# Patient Record
Sex: MALE | Race: WHITE | NOT HISPANIC OR LATINO | Employment: FULL TIME | URBAN - METROPOLITAN AREA
[De-identification: names, ages, dates, MRNs, and addresses within clinical notes are randomized per-mention and may not be internally consistent; named-entity substitution may affect disease eponyms.]

---

## 2020-01-28 ENCOUNTER — OFFICE VISIT (OUTPATIENT)
Dept: FAMILY MEDICINE CLINIC | Facility: CLINIC | Age: 35
End: 2020-01-28

## 2020-01-28 VITALS
SYSTOLIC BLOOD PRESSURE: 150 MMHG | DIASTOLIC BLOOD PRESSURE: 92 MMHG | HEIGHT: 71 IN | OXYGEN SATURATION: 97 % | HEART RATE: 101 BPM | BODY MASS INDEX: 35.28 KG/M2 | WEIGHT: 252 LBS

## 2020-01-28 DIAGNOSIS — R41.840 POOR CONCENTRATION: ICD-10-CM

## 2020-01-28 DIAGNOSIS — I10 ESSENTIAL HYPERTENSION: Primary | ICD-10-CM

## 2020-01-28 DIAGNOSIS — R06.83 SNORING: ICD-10-CM

## 2020-01-28 DIAGNOSIS — G89.29 CHRONIC RIGHT SHOULDER PAIN: ICD-10-CM

## 2020-01-28 DIAGNOSIS — N50.89 SWELLING OF LEFT TESTICLE: ICD-10-CM

## 2020-01-28 DIAGNOSIS — F33.1 DEPRESSION, MAJOR, RECURRENT, MODERATE (HCC): ICD-10-CM

## 2020-01-28 DIAGNOSIS — F41.1 GAD (GENERALIZED ANXIETY DISORDER): ICD-10-CM

## 2020-01-28 DIAGNOSIS — M25.511 CHRONIC RIGHT SHOULDER PAIN: ICD-10-CM

## 2020-01-28 PROCEDURE — 99204 OFFICE O/P NEW MOD 45 MIN: CPT | Performed by: FAMILY MEDICINE

## 2020-01-28 RX ORDER — LISINOPRIL 10 MG/1
10 TABLET ORAL DAILY
Qty: 30 TABLET | Refills: 1 | Status: SHIPPED | OUTPATIENT
Start: 2020-01-28 | End: 2020-02-22 | Stop reason: SDUPTHER

## 2020-01-28 NOTE — PROGRESS NOTES
Frank Champion presents today to establish care.    Chief Complaint   Patient presents with   • Establish Care   • Blood Pressure Check     was on lisinopril in the past, thinks blood pressure may be elevated again   • Snoring     girlfriend reports snoring loudly   • Depression     reports taking wellbutrin for a few years for anxiety depression   • ADD     symptoms of ADD that would like to discuss, trouble staying focused        Snoring   This is a new problem. Associated symptoms include arthralgias and fatigue. Pertinent negatives include no abdominal pain, chest pain or rash.   Depression   Visit Type: initial  Patient presents with the following symptoms: anhedonia, decreased concentration, depressed mood, excessive worry, irritability, nervousness/anxiety and weight gain.  Patient is not experiencing: palpitations and suicidal ideas.  Treatment tried: non-SSRI antidepressants      ADD   This is a new problem. Associated symptoms include arthralgias and fatigue. Pertinent negatives include no abdominal pain, chest pain or rash.   Anxiety   Presents for initial visit. Symptoms include decreased concentration, depressed mood, excessive worry, irritability and nervous/anxious behavior. Patient reports no chest pain, palpitations or suicidal ideas.     His past medical history is significant for depression.   Hypertension   This is a new problem. Associated symptoms include anxiety. Pertinent negatives include no chest pain or palpitations. Past treatments include ACE inhibitors.      PHQ-2/PHQ-9 Depression Screening 1/28/2020   Little interest or pleasure in doing things 1   Feeling down, depressed, or hopeless 2   Trouble falling or staying asleep, or sleeping too much 0   Feeling tired or having little energy 2   Poor appetite or overeating 3   Feeling bad about yourself - or that you are a failure or have let yourself or your family down 2   Trouble concentrating on things, such as reading the newspaper or  watching television 3   Moving or speaking so slowly that other people could have noticed. Or the opposite - being so fidgety or restless that you have been moving around a lot more than usual 0   Thoughts that you would be better off dead, or of hurting yourself in some way 0   Total Score 13   If you checked off any problems, how difficult have these problems made it for you to do your work, take care of things at home, or get along with other people? Somewhat difficult     JERI-7  Over the last two weeks, how often have you been bothered by the following problems?  Feeling nervous, anxious or on edge: 3  Not being able to stop or control worryin  Worrying too much about different things: 2  Trouble Relaxin  Being so restless that it is hard to sit still: 1  Becoming easily annoyed or irritable: 3  Feeling afraid as if something awful might happen: 1  JERI 7 Total Score: 14  If you checked any problems, how difficult have these problems made it for you to do your work, take care of things at home, or get along with other people: Somewhat difficult      Partner states he always snores and she has to wake him up consistently and it sounds like he's not breathing. He's ok if he rolls over throughout the night. Worse when he first falls asleep. Going on for years. He could fall asleep anytime within 5 minutes. He's very groggy in the morning for about 15 minutes.     Used to take lisinopril 4-5 years ago. He ran out of medication. Blood pressure was back to normal when he stopped taking it. No side effects with lisinopril. Elevated blood pressure for past year and half at doctor's offices.     Bad habit of occasionally gone for awhile without taking wellbutrin and mood gets a little worse. He feels better while he's taking it. It worked when he takes it regularly. Past 2 weeks didn't have medication for 5 days and mood was more irritable and depressed. Anxiety getting a little bit worse. Concerned he has ADHD  for years and years. Really hard for him during activities that he's not interested in to stay focused. He feels hyper focused when he like something. He get irritable if he breaks focus. Memory and lack of remembering to take medication. All the time scatterbrained. Used to be a . 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     Right shoulder pain for months. No known injury. Used to be a catcher playing baseball.     Never regular smoker, quit late last year.     He thinks he's had Tdap around 3-6 years ago.     He noticed a nodule on one of his testicles. Onset 3 weeks ago. Denies pain.       Review of Systems   Constitutional: Positive for fatigue, irritability and unexpected weight gain.   HENT: Negative for hearing loss.    Eyes: Positive for visual disturbance ( wears glasses).   Respiratory: Positive for apnea and snoring.         Snoring   Cardiovascular: Negative for chest pain and palpitations.   Gastrointestinal: Negative for abdominal pain.   Endocrine: Negative for cold intolerance and heat intolerance.   Genitourinary: Positive for scrotal swelling. Negative for frequency and testicular pain.   Musculoskeletal: Positive for arthralgias.   Skin: Negative for rash.   Neurological: Negative for headache.   Hematological: Does not bruise/bleed easily.   Psychiatric/Behavioral: Positive for decreased concentration and depressed mood. Negative for suicidal ideas. The patient is nervous/anxious.         Past Medical History:   Diagnosis Date   • Anxiety    • Arthritis    • Depression    • HTN (hypertension)    • Wears glasses         Past Surgical History:   Procedure Laterality Date   • VASECTOMY          Family History   Problem Relation Age of Onset   • Mental illness Mother         bipolar vs depression   • Arthritis Father    • Hyperlipidemia Paternal Grandfather    • Hypertension Paternal Grandfather    • Heart attack Paternal Grandfather    • Stroke  "Paternal Grandfather         Social History     Socioeconomic History   • Marital status: Single     Spouse name: Julienne Patel   • Number of children: Not on file   • Years of education: Not on file   • Highest education level: Not on file   Tobacco Use   • Smoking status: Former Smoker     Types: Cigars, Cigarettes     Last attempt to quit: 2019     Years since quittin.0   • Smokeless tobacco: Never Used   • Tobacco comment: never been a regular smoker, very little tobacco use. reports maybe 2 cigarettes per month.   Substance and Sexual Activity   • Alcohol use: Yes     Alcohol/week: 3.0 - 4.0 standard drinks     Types: 3 - 4 Glasses of wine per week   • Drug use: Never        Current Outpatient Medications on File Prior to Visit   Medication Sig Dispense Refill   • buPROPion XL (WELLBUTRIN XL) 300 MG 24 hr tablet Take 1 tablet by mouth Daily.       No current facility-administered medications on file prior to visit.        Allergies   Allergen Reactions   • Sulfa Antibiotics GI Intolerance        Visit Vitals  /92 (BP Location: Left arm, Patient Position: Sitting, Cuff Size: Adult)   Pulse 101   Ht 180.3 cm (71\")   Wt 114 kg (252 lb)   SpO2 97%   BMI 35.15 kg/m²        Physical Exam   Constitutional: He is oriented to person, place, and time. No distress. He is obese.  HENT:   Nose: Nose normal.   Mouth/Throat: Oropharynx is clear and moist.   Eyes: Conjunctivae are normal.   Wears glasses   Neck: Neck supple. No thyromegaly present.   Cardiovascular: Normal rate and regular rhythm.   No murmur heard.  Pulses:       Posterior tibial pulses are 2+ on the right side, and 2+ on the left side.   Pulmonary/Chest: Effort normal and breath sounds normal.   Abdominal: Soft. There is no hepatosplenomegaly. There is no tenderness. Hernia confirmed negative in the right inguinal area and confirmed negative in the left inguinal area.   Genitourinary: Testes normal and penis normal. Right testis shows no mass, no " swelling and no tenderness. Left testis shows no mass, no swelling and no tenderness. Circumcised.   Musculoskeletal: He exhibits no edema.        Right shoulder: He exhibits decreased range of motion.   Lymphadenopathy:     He has no cervical adenopathy.   Neurological: He is alert and oriented to person, place, and time.   Skin: Skin is warm and dry. No rash noted.   Psychiatric: He has a normal mood and affect.   Vitals reviewed.       No results found for this or any previous visit.     Frank was seen today for establish care, blood pressure check, snoring, depression and add.    Diagnoses and all orders for this visit:    Essential hypertension  -     lisinopril (PRINIVIL,ZESTRIL) 10 MG tablet; Take 1 tablet by mouth Daily.  Uncontrolled.  Patient previously taken lisinopril without side effects.  Plan to start 10 mg.  Reassess next month.  Snoring  -     Ambulatory Referral to Sleep Medicine  New.  Recommend further evaluation with the sleep clinic for probable sleep apnea.  Depression, major, recurrent, moderate (CMS/HCC)  -     Ambulatory Referral to Psychiatry  Uncontrolled.  Medication noncompliance.  He did not need refill of Wellbutrin today.  With his combination of depression and anxiety as well as concern for ADHD recommend further evaluation with psychiatry and medication management.  Poor concentration  -     Ambulatory Referral to Psychiatry  With his combination of depression and anxiety as well as concern for ADHD recommend further evaluation with psychiatry and medication management.  JERI (generalized anxiety disorder)  -     Ambulatory Referral to Psychiatry  With his combination of depression and anxiety as well as concern for ADHD recommend further evaluation with psychiatry and medication management.  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  Recommend starting with physical therapy for  shoulder pain and improved range of motion.  If not improving consider further imaging such as MRI or referral to orthopedics.  Swelling of left testicle  -     US Scrotum & Testicles; Future  Normal exam today.  However with history of nodule and his young age recommend further evaluation with ultrasound.    Prior immunizations records requested from health department.  Return in about 4 weeks (around 2/25/2020) for Office visit.

## 2020-02-22 ENCOUNTER — OFFICE VISIT (OUTPATIENT)
Dept: FAMILY MEDICINE CLINIC | Facility: CLINIC | Age: 35
End: 2020-02-22

## 2020-02-22 ENCOUNTER — LAB (OUTPATIENT)
Dept: LAB | Facility: HOSPITAL | Age: 35
End: 2020-02-22

## 2020-02-22 VITALS
WEIGHT: 255.8 LBS | OXYGEN SATURATION: 96 % | HEART RATE: 81 BPM | SYSTOLIC BLOOD PRESSURE: 128 MMHG | BODY MASS INDEX: 35.81 KG/M2 | DIASTOLIC BLOOD PRESSURE: 72 MMHG | HEIGHT: 71 IN

## 2020-02-22 DIAGNOSIS — F90.2 ATTENTION DEFICIT HYPERACTIVITY DISORDER (ADHD), COMBINED TYPE: ICD-10-CM

## 2020-02-22 DIAGNOSIS — F33.1 DEPRESSION, MAJOR, RECURRENT, MODERATE (HCC): ICD-10-CM

## 2020-02-22 DIAGNOSIS — Z13.0 SCREENING FOR DEFICIENCY ANEMIA: ICD-10-CM

## 2020-02-22 DIAGNOSIS — E66.01 MORBIDLY OBESE (HCC): ICD-10-CM

## 2020-02-22 DIAGNOSIS — Z00.00 WELL ADULT EXAM: Primary | ICD-10-CM

## 2020-02-22 DIAGNOSIS — I10 ESSENTIAL HYPERTENSION: ICD-10-CM

## 2020-02-22 DIAGNOSIS — F41.1 GAD (GENERALIZED ANXIETY DISORDER): ICD-10-CM

## 2020-02-22 DIAGNOSIS — I10 ESSENTIAL HYPERTENSION: Primary | ICD-10-CM

## 2020-02-22 LAB
ALBUMIN SERPL-MCNC: 4.5 G/DL (ref 3.5–5.2)
ALBUMIN/GLOB SERPL: 1.5 G/DL
ALP SERPL-CCNC: 56 U/L (ref 39–117)
ALT SERPL W P-5'-P-CCNC: 31 U/L (ref 1–41)
ANION GAP SERPL CALCULATED.3IONS-SCNC: 13.1 MMOL/L (ref 5–15)
AST SERPL-CCNC: 20 U/L (ref 1–40)
BASOPHILS # BLD AUTO: 0.07 10*3/MM3 (ref 0–0.2)
BASOPHILS NFR BLD AUTO: 0.8 % (ref 0–1.5)
BILIRUB SERPL-MCNC: 0.3 MG/DL (ref 0.2–1.2)
BUN BLD-MCNC: 12 MG/DL (ref 6–20)
BUN/CREAT SERPL: 13 (ref 7–25)
CALCIUM SPEC-SCNC: 9.6 MG/DL (ref 8.6–10.5)
CHLORIDE SERPL-SCNC: 102 MMOL/L (ref 98–107)
CHOLEST SERPL-MCNC: 185 MG/DL (ref 0–200)
CO2 SERPL-SCNC: 26.9 MMOL/L (ref 22–29)
CREAT BLD-MCNC: 0.92 MG/DL (ref 0.76–1.27)
DEPRECATED RDW RBC AUTO: 42.9 FL (ref 37–54)
EOSINOPHIL # BLD AUTO: 0.17 10*3/MM3 (ref 0–0.4)
EOSINOPHIL NFR BLD AUTO: 2 % (ref 0.3–6.2)
ERYTHROCYTE [DISTWIDTH] IN BLOOD BY AUTOMATED COUNT: 12.9 % (ref 12.3–15.4)
GFR SERPL CREATININE-BSD FRML MDRD: 95 ML/MIN/1.73
GLOBULIN UR ELPH-MCNC: 3.1 GM/DL
GLUCOSE BLD-MCNC: 79 MG/DL (ref 65–99)
HCT VFR BLD AUTO: 40.7 % (ref 37.5–51)
HDLC SERPL-MCNC: 35 MG/DL (ref 40–60)
HGB BLD-MCNC: 13.4 G/DL (ref 13–17.7)
IMM GRANULOCYTES # BLD AUTO: 0.02 10*3/MM3 (ref 0–0.05)
IMM GRANULOCYTES NFR BLD AUTO: 0.2 % (ref 0–0.5)
LDLC SERPL CALC-MCNC: 113 MG/DL (ref 0–100)
LDLC/HDLC SERPL: 3.22 {RATIO}
LYMPHOCYTES # BLD AUTO: 2.64 10*3/MM3 (ref 0.7–3.1)
LYMPHOCYTES NFR BLD AUTO: 30.4 % (ref 19.6–45.3)
MCH RBC QN AUTO: 29.6 PG (ref 26.6–33)
MCHC RBC AUTO-ENTMCNC: 32.9 G/DL (ref 31.5–35.7)
MCV RBC AUTO: 89.8 FL (ref 79–97)
MONOCYTES # BLD AUTO: 0.67 10*3/MM3 (ref 0.1–0.9)
MONOCYTES NFR BLD AUTO: 7.7 % (ref 5–12)
NEUTROPHILS # BLD AUTO: 5.11 10*3/MM3 (ref 1.7–7)
NEUTROPHILS NFR BLD AUTO: 58.9 % (ref 42.7–76)
NRBC BLD AUTO-RTO: 0 /100 WBC (ref 0–0.2)
PLATELET # BLD AUTO: 377 10*3/MM3 (ref 140–450)
PMV BLD AUTO: 11.8 FL (ref 6–12)
POTASSIUM BLD-SCNC: 4.3 MMOL/L (ref 3.5–5.2)
PROT SERPL-MCNC: 7.6 G/DL (ref 6–8.5)
RBC # BLD AUTO: 4.53 10*6/MM3 (ref 4.14–5.8)
SODIUM BLD-SCNC: 142 MMOL/L (ref 136–145)
TRIGL SERPL-MCNC: 187 MG/DL (ref 0–150)
TSH SERPL DL<=0.05 MIU/L-ACNC: 1.63 UIU/ML (ref 0.27–4.2)
VLDLC SERPL-MCNC: 37.4 MG/DL (ref 5–40)
WBC NRBC COR # BLD: 8.68 10*3/MM3 (ref 3.4–10.8)

## 2020-02-22 PROCEDURE — 80053 COMPREHEN METABOLIC PANEL: CPT

## 2020-02-22 PROCEDURE — 84443 ASSAY THYROID STIM HORMONE: CPT

## 2020-02-22 PROCEDURE — 80061 LIPID PANEL: CPT

## 2020-02-22 PROCEDURE — 85025 COMPLETE CBC W/AUTO DIFF WBC: CPT

## 2020-02-22 PROCEDURE — 99395 PREV VISIT EST AGE 18-39: CPT | Performed by: FAMILY MEDICINE

## 2020-02-22 RX ORDER — LISINOPRIL 10 MG/1
10 TABLET ORAL DAILY
Qty: 30 TABLET | Refills: 5 | Status: SHIPPED | OUTPATIENT
Start: 2020-02-22 | End: 2020-06-30 | Stop reason: SDUPTHER

## 2020-02-23 PROBLEM — E78.1 HYPERTRIGLYCERIDEMIA: Status: ACTIVE | Noted: 2020-02-22

## 2020-02-26 ENCOUNTER — CONSULT (OUTPATIENT)
Dept: SLEEP MEDICINE | Facility: HOSPITAL | Age: 35
End: 2020-02-26

## 2020-02-26 VITALS
HEART RATE: 70 BPM | DIASTOLIC BLOOD PRESSURE: 84 MMHG | BODY MASS INDEX: 34.27 KG/M2 | OXYGEN SATURATION: 98 % | HEIGHT: 72 IN | WEIGHT: 253 LBS | SYSTOLIC BLOOD PRESSURE: 137 MMHG

## 2020-02-26 DIAGNOSIS — E66.09 CLASS 1 OBESITY DUE TO EXCESS CALORIES WITHOUT SERIOUS COMORBIDITY WITH BODY MASS INDEX (BMI) OF 34.0 TO 34.9 IN ADULT: ICD-10-CM

## 2020-02-26 DIAGNOSIS — G47.33 OBSTRUCTIVE SLEEP APNEA, ADULT: ICD-10-CM

## 2020-02-26 DIAGNOSIS — R06.83 SNORING: Primary | ICD-10-CM

## 2020-02-26 DIAGNOSIS — G47.61 PLMD (PERIODIC LIMB MOVEMENT DISORDER): ICD-10-CM

## 2020-02-26 PROBLEM — E66.811 CLASS 1 OBESITY DUE TO EXCESS CALORIES WITHOUT SERIOUS COMORBIDITY WITH BODY MASS INDEX (BMI) OF 34.0 TO 34.9 IN ADULT: Status: ACTIVE | Noted: 2020-02-22

## 2020-02-26 PROCEDURE — 99203 OFFICE O/P NEW LOW 30 MIN: CPT | Performed by: INTERNAL MEDICINE

## 2020-02-26 RX ORDER — RANITIDINE HCL 75 MG
75 TABLET ORAL 2 TIMES DAILY
COMMUNITY
End: 2020-06-30

## 2020-02-28 NOTE — PROGRESS NOTES
Subjective   Frank Champion is a 34 y.o. male is being seen for consultation today at the request of Magaly Currie MD for the evaluation of snoring and nonrestorative sleep.    History of Present Illness  Patient states that he falls asleep quickly at night but often awakens frequently and weight is up about 3:57 AM and sometimes has problems getting back to sleep.  He has had snoring noted for at least 10 years and apneas noted also for about 10 years.  He has awaken gasping for breath.  He is not rested on arising in the morning.  He has a morning headache 1 to 2 days a week.  He is sleepy during the day.  He sometimes gets sleepy driving in the evening and has run long rumble strips.    He has a history of loud snoring snores in all positions he has awaken with a dry mouth and gasping.  He is awaken with a sore throat.  He has trouble breathing through his nose both day and night but denies ever breaking his nose.  He has a history of reflux for which she takes over-the-counter medications.  He denies hypnagogue hallucinations or sleep paralysis.  He has occasional kicking of his legs at night.  He has some chronic shoulder pain that sometimes bothers him.  He is gained about 15 pounds in the past year.      He goes to bed between 11 and 12.  He will fall asleep very quickly.  He awakens once or twice during the night.  He thinks he gets 5 to 6 hours of sleep arising at 7 AM.  He says he still feels groggy.  He has had hypertension noted for about a month.  He denies any history of diabetes coronary artery disease.  He does have a history of depression and anxiety.  Allergies   Allergen Reactions   • Sulfa Antibiotics GI Intolerance   He has a history of allergies to pollen      Current Outpatient Medications:   •  buPROPion XL (WELLBUTRIN XL) 300 MG 24 hr tablet, Take 1 tablet by mouth Daily., Disp: , Rfl:   •  lisinopril (PRINIVIL,ZESTRIL) 10 MG tablet, Take 1 tablet by mouth Daily., Disp: 30  tablet, Rfl: 5  •  raNITIdine (ZANTAC) 75 MG tablet, Take 75 mg by mouth 2 (Two) Times a Day., Disp: , Rfl:     Social History    Tobacco Use      Smoking status: Former Smoker he estimates smoking about a quarter of a pack per day for 10 years until quitting last year.        Types: Cigars, Cigarettes        Quit date:         Years since quittin.1      Smokeless tobacco: Never Used      Tobacco comment: never been a regular smoker, very little tobacco use. reports maybe 2 cigarettes per month.       Social History     Substance and Sexual Activity   Alcohol Use Yes he estimates having 2 drinks twice a week   • Alcohol/week: 3.0 - 4.0 standard drinks   • Types: 3 - 4 Glasses of wine per week       Caffeine: He has 2 cups of coffee and 2-3 zac each day    Past Medical History:   Diagnosis Date   • Anxiety    • Arthritis    • Depression    • HTN (hypertension)    • Hypertriglyceridemia 2020   • Wears glasses        Past Surgical History:   Procedure Laterality Date   • VASECTOMY         Family History   Problem Relation Age of Onset   • Mental illness Mother         bipolar vs depression   • Arthritis Father    • Hyperlipidemia Paternal Grandfather    • Hypertension Paternal Grandfather    • Heart attack Paternal Grandfather    • Stroke Paternal Grandfather    • Colon polyps Neg Hx    • Colon cancer Neg Hx        The following portions of the patient's history were reviewed and updated as appropriate: allergies, current medications, past family history, past medical history, past social history, past surgical history and problem list.    Review of Systems   Constitutional: Positive for unexpected weight change.   HENT: Negative.    Eyes: Negative.    Respiratory: Negative.    Cardiovascular: Negative.    Gastrointestinal:        He complains of frequent heartburn   Endocrine: Negative.    Genitourinary: Negative.    Musculoskeletal: Negative.    Skin: Negative.    Allergic/Immunologic: Positive for  "environmental allergies.   Neurological: Negative.    Hematological: Negative.    Psychiatric/Behavioral: Positive for dysphoric mood. The patient is nervous/anxious.    [place] score is 15/24    Objective     /84   Pulse 70   Ht 181.6 cm (71.5\")   Wt 115 kg (253 lb)   SpO2 98%   BMI 34.79 kg/m²      Physical Exam   Constitutional: He is oriented to person, place, and time. He appears well-developed and well-nourished.   He is obese.   HENT:   Head: Normocephalic and atraumatic.   He has Mallampati class IV anatomy.   Eyes: Pupils are equal, round, and reactive to light. EOM are normal.   Neck: Normal range of motion. Neck supple.   Cardiovascular: Normal rate, regular rhythm and normal heart sounds.   Pulmonary/Chest: Effort normal and breath sounds normal.   Abdominal: Soft. Bowel sounds are normal.   Musculoskeletal: Normal range of motion. He exhibits no edema.   Neurological: He is alert and oriented to person, place, and time.   Skin: Skin is warm and dry.   Psychiatric: He has a normal mood and affect. His behavior is normal.         Assessment/Plan   Frank was seen today for sleeping problem.    Diagnoses and all orders for this visit:    Snoring  -     Home Sleep Study; Future    Obstructive sleep apnea, adult  -     Home Sleep Study; Future    Class 1 obesity due to excess calories without serious comorbidity with body mass index (BMI) of 34.0 to 34.9 in adult    PLMD (periodic limb movement disorder)    Patient is a history of snoring and nonrestorative sleep.  I think is an excellent story for obstructive sleep apnea.  He also occasionally has some kicking of his legs at night.  Think is a fairly good story for obstructive sleep apnea and may have some periodic limb movement disorder also.  We will plan to proceed to home sleep testing.  I discussed possible therapies for sleep apnea including CPAP, weight control, oral appliance, and surgery.  If he continues to have problems with his legs " after treatment of his sleep apnea we may need further evaluation.  We have discussed the long-term consequences of untreated obstructive sleep apnea.  He is to return in after study.  He is encouraged to lose weight.  He declines referral for dietitian.  He is encouraged to avoid alcohol and sedatives close to bedtime.  He is encouraged to practice lateral position sleep.         Glen Jarquin MD [facility]  Sleep Medicine  Pulmonary and Critical Care Medicine

## 2020-03-10 ENCOUNTER — HOSPITAL ENCOUNTER (OUTPATIENT)
Dept: SLEEP MEDICINE | Facility: HOSPITAL | Age: 35
Discharge: HOME | End: 2020-03-10
Admitting: INTERNAL MEDICINE

## 2020-03-10 VITALS
RESPIRATION RATE: 16 BRPM | BODY MASS INDEX: 35.48 KG/M2 | WEIGHT: 253.4 LBS | HEART RATE: 88 BPM | HEIGHT: 71 IN | DIASTOLIC BLOOD PRESSURE: 84 MMHG | SYSTOLIC BLOOD PRESSURE: 130 MMHG | OXYGEN SATURATION: 97 %

## 2020-03-10 DIAGNOSIS — R06.83 SNORING: ICD-10-CM

## 2020-03-10 DIAGNOSIS — G47.33 OBSTRUCTIVE SLEEP APNEA, ADULT: ICD-10-CM

## 2020-03-10 PROCEDURE — 95806 SLEEP STUDY UNATT&RESP EFFT: CPT

## 2020-03-10 PROCEDURE — 95806 SLEEP STUDY UNATT&RESP EFFT: CPT | Performed by: INTERNAL MEDICINE

## 2020-03-11 DIAGNOSIS — R06.83 SNORING: ICD-10-CM

## 2020-03-11 DIAGNOSIS — G47.33 OBSTRUCTIVE SLEEP APNEA, ADULT: Primary | ICD-10-CM

## 2020-03-20 NOTE — PROGRESS NOTES
ADVISED PATIENT OF STUDY RESULTS. PATIENT VERBALIZED UNDERSTANDING AND WAS AGREEABLE TO PAP THERAPY

## 2020-06-30 ENCOUNTER — OFFICE VISIT (OUTPATIENT)
Dept: FAMILY MEDICINE CLINIC | Facility: CLINIC | Age: 35
End: 2020-06-30

## 2020-06-30 VITALS
DIASTOLIC BLOOD PRESSURE: 92 MMHG | SYSTOLIC BLOOD PRESSURE: 138 MMHG | WEIGHT: 258.8 LBS | HEART RATE: 95 BPM | BODY MASS INDEX: 35.05 KG/M2 | HEIGHT: 72 IN | OXYGEN SATURATION: 97 %

## 2020-06-30 DIAGNOSIS — F41.1 GAD (GENERALIZED ANXIETY DISORDER): ICD-10-CM

## 2020-06-30 DIAGNOSIS — I10 ESSENTIAL HYPERTENSION: Primary | ICD-10-CM

## 2020-06-30 DIAGNOSIS — R68.82 DECREASED LIBIDO: ICD-10-CM

## 2020-06-30 DIAGNOSIS — F32.5 MAJOR DEPRESSION IN COMPLETE REMISSION (HCC): ICD-10-CM

## 2020-06-30 PROCEDURE — 99214 OFFICE O/P EST MOD 30 MIN: CPT | Performed by: FAMILY MEDICINE

## 2020-06-30 RX ORDER — LISINOPRIL 20 MG/1
20 TABLET ORAL DAILY
Qty: 30 TABLET | Refills: 5 | Status: SHIPPED | OUTPATIENT
Start: 2020-06-30 | End: 2020-12-10 | Stop reason: SDUPTHER

## 2020-06-30 RX ORDER — LANSOPRAZOLE 15 MG/1
15 CAPSULE, DELAYED RELEASE ORAL DAILY
COMMUNITY
End: 2021-05-24

## 2020-06-30 NOTE — PROGRESS NOTES
Chief Complaint   Patient presents with   • Depression     wellbutrin is affecting sex drive   • Hypertension     blood pressure is still elevated when checking at home        Depression   Visit Type: follow-up  Patient is not experiencing: depressed mood, nervousness/anxiety and palpitations.  Treatment side effects: decreased libido.  Hypertension   This is a chronic problem. The problem has been gradually worsening since onset. The problem is uncontrolled. Pertinent negatives include no chest pain or palpitations. Past treatments include ACE inhibitors. Current antihypertension treatment includes ACE inhibitors. The current treatment provides mild improvement.      He can tell when his blood pressure is up. Lisinopril worked for awhile at 5mg. Home blood pressure 145-150/90-95.     In the past has had low sex drive. Worse with wellbutrin antidepressant. Wonders if his testosterone level . Anxiety and depression has been doing well.     Review of Systems   Cardiovascular: Negative for chest pain and palpitations.   Genitourinary: Positive for decreased libido.   Neurological: Negative for dizziness, light-headedness and headache.   Psychiatric/Behavioral: Negative for depressed mood. The patient is not nervous/anxious.         Current Outpatient Medications   Medication Sig Dispense Refill   • buPROPion XL (WELLBUTRIN XL) 300 MG 24 hr tablet Take 1 tablet by mouth Daily.     • lisinopril (PRINIVIL,ZESTRIL) 10 MG tablet Take 1 tablet by mouth Daily. 30 tablet 5     No current facility-administered medications for this visit.        Allergies   Allergen Reactions   • Sulfa Antibiotics GI Intolerance       Past Medical History:   Diagnosis Date   • Anxiety    • Arthritis    • Depression    • HTN (hypertension)    • Hypertriglyceridemia 02/22/2020   • Wears glasses         Past Surgical History:   Procedure Laterality Date   • VASECTOMY          Family History   Problem Relation Age of Onset   • Mental illness Mother   "       bipolar vs depression   • Arthritis Father    • Hyperlipidemia Paternal Grandfather    • Hypertension Paternal Grandfather    • Heart attack Paternal Grandfather    • Stroke Paternal Grandfather    • Colon polyps Neg Hx    • Colon cancer Neg Hx         Social History     Socioeconomic History   • Marital status: Single     Spouse name: Julienne Patel   • Number of children: Not on file   • Years of education: Not on file   • Highest education level: Not on file   Tobacco Use   • Smoking status: Former Smoker     Types: Cigars, Cigarettes     Last attempt to quit: 2019     Years since quittin.4   • Smokeless tobacco: Never Used   • Tobacco comment: never been a regular smoker, very little tobacco use. reports maybe 2 cigarettes per month.   Substance and Sexual Activity   • Alcohol use: Yes     Alcohol/week: 3.0 - 4.0 standard drinks     Types: 3 - 4 Glasses of wine per week   • Drug use: Not Currently     Types: Marijuana        Vitals:    20 1156   BP: 138/92   BP Location: Left arm   Patient Position: Sitting   Cuff Size: Adult   Pulse: 95   SpO2: 97%   Weight: 117 kg (258 lb 12.8 oz)   Height: 181.6 cm (71.5\")      Body mass index is 35.59 kg/m².    Physical Exam   Constitutional: No distress. He is obese.  Cardiovascular: Normal rate and regular rhythm.   No murmur heard.  Pulmonary/Chest: Effort normal and breath sounds normal.   Psychiatric: He has a normal mood and affect. His behavior is normal. Judgment and thought content normal.   Vitals reviewed.           Frank was seen today for depression and hypertension.    Diagnoses and all orders for this visit:    Essential hypertension  -     lisinopril (PRINIVIL,ZESTRIL) 20 MG tablet; Take 1 tablet by mouth Daily.  Uncontrolled.  Increase lisinopril to 20 mg.  Check home blood pressure and bring readings to his next appointment in August.  Major depression in complete remission (CMS/HCC)  Stable.  Continue current med.  JERI (generalized anxiety " disorder)  Stable.  Continue current med.  Decreased libido  -     Testosterone; Future  New. Patient has had longstanding decreased libido and wonders about testosterone.  Check morning testosterone between 8 and 10 AM when fasting.       Return for as scheduled.    Dr. Magaly De Jesus

## 2020-07-01 ENCOUNTER — LAB (OUTPATIENT)
Dept: LAB | Facility: HOSPITAL | Age: 35
End: 2020-07-01

## 2020-07-01 DIAGNOSIS — R68.82 DECREASED LIBIDO: ICD-10-CM

## 2020-07-01 PROCEDURE — 36415 COLL VENOUS BLD VENIPUNCTURE: CPT

## 2020-07-01 PROCEDURE — 84403 ASSAY OF TOTAL TESTOSTERONE: CPT

## 2020-07-02 ENCOUNTER — TELEPHONE (OUTPATIENT)
Dept: FAMILY MEDICINE CLINIC | Facility: CLINIC | Age: 35
End: 2020-07-02

## 2020-07-02 DIAGNOSIS — R79.89 DECREASED TESTOSTERONE LEVEL: Primary | ICD-10-CM

## 2020-07-02 LAB — TESTOST SERPL-MCNC: 242 NG/DL (ref 249–836)

## 2020-07-02 NOTE — TELEPHONE ENCOUNTER
The test results show that your testosterone is borderline.  I would like you to return to the lab when fasting for additional blood work.  Need to be collected between 8 and 10 AM.

## 2020-07-13 ENCOUNTER — LAB (OUTPATIENT)
Dept: LAB | Facility: HOSPITAL | Age: 35
End: 2020-07-13

## 2020-07-13 DIAGNOSIS — R79.89 DECREASED TESTOSTERONE LEVEL: ICD-10-CM

## 2020-07-13 LAB
FSH SERPL-ACNC: 4.14 MIU/ML
LH SERPL-ACNC: 4.15 MIU/ML
TESTOST SERPL-MCNC: 215 NG/DL (ref 249–836)

## 2020-07-13 PROCEDURE — 83002 ASSAY OF GONADOTROPIN (LH): CPT

## 2020-07-13 PROCEDURE — 36415 COLL VENOUS BLD VENIPUNCTURE: CPT

## 2020-07-13 PROCEDURE — 84403 ASSAY OF TOTAL TESTOSTERONE: CPT

## 2020-07-13 PROCEDURE — 83001 ASSAY OF GONADOTROPIN (FSH): CPT

## 2020-07-16 ENCOUNTER — PATIENT MESSAGE (OUTPATIENT)
Dept: FAMILY MEDICINE CLINIC | Facility: CLINIC | Age: 35
End: 2020-07-16

## 2020-07-21 ENCOUNTER — OFFICE VISIT (OUTPATIENT)
Dept: FAMILY MEDICINE CLINIC | Facility: CLINIC | Age: 35
End: 2020-07-21

## 2020-07-21 VITALS
OXYGEN SATURATION: 97 % | BODY MASS INDEX: 35.08 KG/M2 | HEIGHT: 72 IN | SYSTOLIC BLOOD PRESSURE: 122 MMHG | HEART RATE: 81 BPM | DIASTOLIC BLOOD PRESSURE: 84 MMHG | WEIGHT: 259 LBS

## 2020-07-21 DIAGNOSIS — Z79.899 CONTROLLED SUBSTANCE AGREEMENT SIGNED: ICD-10-CM

## 2020-07-21 DIAGNOSIS — Z51.81 THERAPEUTIC DRUG MONITORING: ICD-10-CM

## 2020-07-21 DIAGNOSIS — R79.89 LOW TESTOSTERONE: Primary | ICD-10-CM

## 2020-07-21 PROCEDURE — 99213 OFFICE O/P EST LOW 20 MIN: CPT | Performed by: FAMILY MEDICINE

## 2020-07-21 PROCEDURE — 96372 THER/PROPH/DIAG INJ SC/IM: CPT | Performed by: FAMILY MEDICINE

## 2020-07-21 RX ORDER — TESTOSTERONE CYPIONATE 100 MG/ML
100 INJECTION, SOLUTION INTRAMUSCULAR
Qty: 10 ML | Refills: 0 | Status: SHIPPED | OUTPATIENT
Start: 2020-07-21 | End: 2020-08-20

## 2020-07-21 RX ORDER — TESTOSTERONE CYPIONATE 200 MG/ML
100 INJECTION, SOLUTION INTRAMUSCULAR
Status: DISCONTINUED | OUTPATIENT
Start: 2020-07-21 | End: 2020-08-20

## 2020-07-21 RX ORDER — TESTOSTERONE CYPIONATE 100 MG/ML
100 INJECTION, SOLUTION INTRAMUSCULAR ONCE
Status: CANCELLED | OUTPATIENT
Start: 2020-07-21 | End: 2020-07-21

## 2020-07-21 RX ADMIN — TESTOSTERONE CYPIONATE 100 MG: 200 INJECTION, SOLUTION INTRAMUSCULAR at 13:12

## 2020-07-21 NOTE — PROGRESS NOTES
Chief Complaint   Patient presents with   • Labs Only     discuss testosterone levels        HPI   Answers for HPI/ROS submitted by the patient on 7/19/2020   What is the primary reason for your visit?: Other  Please describe your symptoms.: Need to discuss low testosterone levels.  Have you had these symptoms before?: No  How long have you been having these symptoms?: Greater than 2 weeks  Please list any medications you are currently taking for this condition.: NA  Please describe any probable cause for these symptoms. : No clue.    He is familiar with topical creams and injection therapy. He is more comfortable starting with shot.  He has had decreased sex drive for a long time for denies erectile dysfunction.  He  had a vasectomy.      Review of Systems   Constitutional: Positive for fatigue.   Genitourinary: Positive for decreased libido. Negative for erectile dysfunction.        Current Outpatient Medications   Medication Sig Dispense Refill   • buPROPion XL (WELLBUTRIN XL) 300 MG 24 hr tablet Take 1 tablet by mouth Daily.     • lansoprazole (PREVACID) 15 MG capsule Take 15 mg by mouth Daily.     • lisinopril (PRINIVIL,ZESTRIL) 20 MG tablet Take 1 tablet by mouth Daily. 30 tablet 5     No current facility-administered medications for this visit.        Allergies   Allergen Reactions   • Sulfa Antibiotics GI Intolerance       Past Medical History:   Diagnosis Date   • Anxiety    • Arthritis    • Depression    • HTN (hypertension)    • Hypertriglyceridemia 02/22/2020   • Wears glasses         Past Surgical History:   Procedure Laterality Date   • VASECTOMY          Family History   Problem Relation Age of Onset   • Mental illness Mother         bipolar vs depression   • Arthritis Father    • Hyperlipidemia Paternal Grandfather    • Hypertension Paternal Grandfather    • Heart attack Paternal Grandfather    • Stroke Paternal Grandfather    • Colon polyps Neg Hx    • Colon cancer Neg Hx         Social History  "    Socioeconomic History   • Marital status: Single     Spouse name: Julienne Patel   • Number of children: Not on file   • Years of education: Not on file   • Highest education level: Not on file   Tobacco Use   • Smoking status: Former Smoker     Types: Cigars, Cigarettes     Last attempt to quit: 2019     Years since quittin.5   • Smokeless tobacco: Never Used   • Tobacco comment: never been a regular smoker, very little tobacco use. reports maybe 2 cigarettes per month.   Substance and Sexual Activity   • Alcohol use: Yes     Alcohol/week: 3.0 - 4.0 standard drinks     Types: 3 - 4 Glasses of wine per week   • Drug use: Not Currently     Types: Marijuana        Vitals:    20 1156   BP: 122/84   BP Location: Right arm   Patient Position: Sitting   Cuff Size: Large Adult   Pulse: 81   SpO2: 97%   Weight: 117 kg (259 lb)   Height: 181.6 cm (71.5\")      Body mass index is 35.62 kg/m².    Physical Exam   Constitutional: No distress.   Cardiovascular: Normal rate and regular rhythm.   No murmur heard.  Pulmonary/Chest: Effort normal and breath sounds normal.   Psychiatric: He has a normal mood and affect.   Vitals reviewed.      Results for orders placed or performed in visit on 20   FSH & LH   Result Value Ref Range    FSH 4.14 mIU/mL    LH 4.15 mIU/mL   Testosterone   Result Value Ref Range    Testosterone, Total 215.00 (L) 249.00 - 836.00 ng/dL      Administrations This Visit     Testosterone Cypionate (DEPOTESTOTERONE CYPIONATE) injection 100 mg     Admin Date  2020 Action  Given Dose  100 mg Route  Intramuscular Administered By  Racheal Shaw MA Ryan was seen today for labs only.    Diagnoses and all orders for this visit:    Low testosterone  -     testosterone cypionate (Depo-Testosterone) 100 MG/ML solution injection; Inject 1 mL into the appropriate muscle as directed by prescriber Every 30 (Thirty) Days.  -     Lipid Panel; Future  -     CBC & Differential; " Future  -     Testosterone; Future  -     Testosterone Cypionate (DEPOTESTOTERONE CYPIONATE) injection 100 mg    Therapeutic drug monitoring  -     Lipid Panel; Future  -     CBC & Differential; Future  -     Testosterone; Future    Controlled substance agreement signed    New.  Confirmed with 2 warning levels below.  No signs of pituitary dysfunction. The treatment plan includes a controlled substance.  Controlled Substance Medication Agreement signed today.Risks, benefits, and alternative treatments reviewed.  Return for nurse visit to administer testosterone injection.  Follow-up in 3 to 4 weeks with previsit fasting labs.      Return in about 27 days (around 8/17/2020) for Office visit low testosterone.    Dr. Magaly De Jesus

## 2020-07-21 NOTE — PATIENT INSTRUCTIONS
Testosterone Replacement Therapy    Testosterone replacement therapy (TRT) is used to treat men who have a low testosterone level (hypogonadism). Testosterone is a male hormone that is produced in the testicles. It is responsible for typically male characteristics and for maintaining a man's sex drive and the ability to get an erection. Testosterone also supports bone and muscle health. TRT can be a gel, liquid, or patch that you put on your skin. It can also be in the form of a tablet or an injection. In some cases, your health care provider may insert long-acting pellets under your skin.  In most men, the level of testosterone starts to decline gradually after age 45. Low testosterone can also be caused by certain medical conditions, medicines, and obesity. Your health care provider can diagnose hypogonadism with at least two blood tests that are done early in the morning.  Low testosterone may not need to be treated. TRT is usually a choice that you make with your health care provider. Your health care provider may recommend TRT if you have low testosterone that is causing symptoms, such as:  · Low sex drive.  · Erection problems.  · Breast enlargement.  · Loss of body hair.  · Weak muscles or bones.  · Shrinking testicles.  · Increased body fat.  · Low energy.  · Hot flashes.  · Depression.  · Decreased work performance.  TRT is a lifetime treatment. If you stop treatment, your testosterone will drop, and your symptoms may return.  What are the risks?  Testosterone replacement therapy may have side effects, including:  · Lower sperm count.  · Skin irritation at the application or injection site.  · Mouth irritation if you take an oral tablet.  · Acne.  · Swelling of your legs or feet.  · Tender breasts.  · Dizziness.  · Sleep disturbance.  · Mood swings.  · Possible increased risk of stroke or heart attack.  Testosterone replacement therapy may also increase your risk for prostate cancer or male breast cancer.  You should not use TRT if you have either of those conditions. Your health care provider also may not recommend TRT if:  · You are suspected of having prostate cancer.  · You want to father a child.  · You have a high number of red blood cells.  · You have untreated sleep apnea.  · You have a very large prostate.  Supplies needed:  · Your health care provider will prescribe the testosterone gel, solution, or medicine that you need. If your health care provider teaches you to do self-injections at home, you will also need:  ? Your medicine vial.  ? Disposable needles and syringes.  ? Alcohol swabs.  ? A needle disposal container.  ? Adhesive bandages.  How to use testosterone replacement therapy  Your health care provider will help you find the TRT option that will work best for you based on your preference, the side effects, and the cost. You may:  · Rub testosterone gel on your upper arm or shoulder every day after a shower. This is the most common type of TRT. Do not let women or children come in contact with the gel.  · Apply a testosterone solution under your arms once each day.  · Place a testosterone patch on your skin once each day.  · Dissolve a testosterone tablet in your mouth twice each day.  · Have a testosterone pellet inserted under your skin by your health care provider. This will be replaced every 3-6 months.  · Use testosterone nasal spray three times each day.  · Get testosterone injections. For some types of testosterone, your health care provider will give you this injection. With other types of testosterone, you may be taught to give injections to yourself. The frequency of injections may vary based on the type of testosterone that you receive.  Follow these instructions at home:  · Take over-the-counter and prescription medicines only as told by your health care provider.  · Lose weight if you are overweight. Ask your health care provider to help you start a healthy diet and exercise program to  reach and maintain a healthy weight.  · Work with your health care provider to treat other medical conditions that may lower your testosterone. These include obesity, high blood pressure, high cholesterol, diabetes, liver disease, kidney disease, and sleep apnea.  · Keep all follow-up visits as told by your health care provider. This is important.  General recommendations  · Discuss all risks and benefits with your health care provider before starting therapy.  · Work with your health care provider to check your prostate health and do blood testing before you start therapy.  · Do not use any testosterone replacement therapies that are not prescribed by your health care provider or not approved for use in the [place]  · Do not use TRT for bodybuilding or to improve sexual performance. TRT should be used only to treat symptoms of low testosterone.  · Return for all repeat prostate checks and blood tests during therapy, as told by your health care provider.  Where to find more information  Learn more about testosterone replacement therapy from:  · [employer]: www.urologyhealth.org/urologic-conditions/low-testosterone-(hypogonadism)  · Endocrine Society: www.hormone.org/diseases-and-conditions/mens-health/hypogonadism  Contact a health care provider if:  · You have side effects from your testosterone replacement therapy.  · You continue to have symptoms of low testosterone during treatment.  · You develop new symptoms during treatment.  Summary  · Testosterone replacement therapy is only for men who have low testosterone as determined by blood testing and who have symptoms of low testosterone.  · Testosterone replacement therapy should be prescribed only by a health care provider and should be used under the supervision of a health care provider.  · You may not be able to take testosterone if you have certain medical conditions, including prostate cancer, male breast cancer, or heart  disease.  · Testosterone replacement therapy may have side effects and may make some medical conditions worse.  · Talk with your health care provider about all the risks and benefits before you start therapy.  This information is not intended to replace advice given to you by your health care provider. Make sure you discuss any questions you have with your health care provider.  Document Released: 09/07/2017 Document Revised: 12/31/2017 Document Reviewed: 09/07/2017  Elsevier Patient Education © 2020 [employer] Inc[employer]

## 2020-08-13 DIAGNOSIS — R79.89 LOW TESTOSTERONE: Primary | ICD-10-CM

## 2020-08-14 ENCOUNTER — LAB (OUTPATIENT)
Dept: LAB | Facility: HOSPITAL | Age: 35
End: 2020-08-14

## 2020-08-14 DIAGNOSIS — Z51.81 THERAPEUTIC DRUG MONITORING: ICD-10-CM

## 2020-08-14 DIAGNOSIS — R79.89 LOW TESTOSTERONE: ICD-10-CM

## 2020-08-14 LAB
BASOPHILS # BLD AUTO: 0.06 10*3/MM3 (ref 0–0.2)
BASOPHILS NFR BLD AUTO: 0.8 % (ref 0–1.5)
CHOLEST SERPL-MCNC: 204 MG/DL (ref 0–200)
DEPRECATED RDW RBC AUTO: 40.7 FL (ref 37–54)
EOSINOPHIL # BLD AUTO: 0.12 10*3/MM3 (ref 0–0.4)
EOSINOPHIL NFR BLD AUTO: 1.6 % (ref 0.3–6.2)
ERYTHROCYTE [DISTWIDTH] IN BLOOD BY AUTOMATED COUNT: 12.7 % (ref 12.3–15.4)
HCT VFR BLD AUTO: 41.1 % (ref 37.5–51)
HDLC SERPL-MCNC: 33 MG/DL (ref 40–60)
HGB BLD-MCNC: 14.1 G/DL (ref 13–17.7)
IMM GRANULOCYTES # BLD AUTO: 0.02 10*3/MM3 (ref 0–0.05)
IMM GRANULOCYTES NFR BLD AUTO: 0.3 % (ref 0–0.5)
LDLC SERPL CALC-MCNC: 129 MG/DL (ref 0–100)
LDLC/HDLC SERPL: 3.92 {RATIO}
LYMPHOCYTES # BLD AUTO: 2.39 10*3/MM3 (ref 0.7–3.1)
LYMPHOCYTES NFR BLD AUTO: 31 % (ref 19.6–45.3)
MCH RBC QN AUTO: 30.3 PG (ref 26.6–33)
MCHC RBC AUTO-ENTMCNC: 34.3 G/DL (ref 31.5–35.7)
MCV RBC AUTO: 88.2 FL (ref 79–97)
MONOCYTES # BLD AUTO: 0.57 10*3/MM3 (ref 0.1–0.9)
MONOCYTES NFR BLD AUTO: 7.4 % (ref 5–12)
NEUTROPHILS NFR BLD AUTO: 4.55 10*3/MM3 (ref 1.7–7)
NEUTROPHILS NFR BLD AUTO: 58.9 % (ref 42.7–76)
NRBC BLD AUTO-RTO: 0 /100 WBC (ref 0–0.2)
PLATELET # BLD AUTO: 387 10*3/MM3 (ref 140–450)
PMV BLD AUTO: 11.8 FL (ref 6–12)
RBC # BLD AUTO: 4.66 10*6/MM3 (ref 4.14–5.8)
TESTOST SERPL-MCNC: 207 NG/DL (ref 249–836)
TRIGL SERPL-MCNC: 208 MG/DL (ref 0–150)
VLDLC SERPL-MCNC: 41.6 MG/DL (ref 5–40)
WBC # BLD AUTO: 7.71 10*3/MM3 (ref 3.4–10.8)

## 2020-08-14 PROCEDURE — 84402 ASSAY OF FREE TESTOSTERONE: CPT

## 2020-08-14 PROCEDURE — 84403 ASSAY OF TOTAL TESTOSTERONE: CPT

## 2020-08-14 PROCEDURE — 85025 COMPLETE CBC W/AUTO DIFF WBC: CPT

## 2020-08-14 PROCEDURE — 80061 LIPID PANEL: CPT

## 2020-08-14 PROCEDURE — 36415 COLL VENOUS BLD VENIPUNCTURE: CPT

## 2020-08-17 ENCOUNTER — PATIENT MESSAGE (OUTPATIENT)
Dept: FAMILY MEDICINE CLINIC | Facility: CLINIC | Age: 35
End: 2020-08-17

## 2020-08-18 NOTE — TELEPHONE ENCOUNTER
From: Frank Champion  To: Magaly Kay MD  Sent: 8/17/2020 6:59 PM EDT  Subject: Test Results Question    Since my testosterone levels showed low, can I come in for another injection?

## 2020-08-18 NOTE — TELEPHONE ENCOUNTER
Dr. Brennan ordered testosterone levels on patient, still low. This is [facility] patient. Since you ordered test I just wanted to check that you are OK with him having another injection.  Benita

## 2020-08-19 LAB
TESTOST FREE SERPL-MCNC: 7.1 PG/ML (ref 8.7–25.1)
TESTOST SERPL-MCNC: 256.9 NG/DL (ref 264–916)

## 2020-08-20 ENCOUNTER — CLINICAL SUPPORT (OUTPATIENT)
Dept: FAMILY MEDICINE CLINIC | Facility: CLINIC | Age: 35
End: 2020-08-20

## 2020-08-20 DIAGNOSIS — R79.89 LOW TESTOSTERONE: Primary | ICD-10-CM

## 2020-08-20 DIAGNOSIS — R79.89 LOW TESTOSTERONE: ICD-10-CM

## 2020-08-20 PROCEDURE — 96372 THER/PROPH/DIAG INJ SC/IM: CPT | Performed by: FAMILY MEDICINE

## 2020-08-20 RX ORDER — TESTOSTERONE CYPIONATE 100 MG/ML
100 INJECTION, SOLUTION INTRAMUSCULAR
Qty: 0.84 ML | Refills: 1 | Status: SHIPPED | OUTPATIENT
Start: 2020-08-20 | End: 2020-09-11

## 2020-08-20 RX ORDER — TESTOSTERONE CYPIONATE 200 MG/ML
100 INJECTION, SOLUTION INTRAMUSCULAR
Status: DISCONTINUED | OUTPATIENT
Start: 2020-08-21 | End: 2020-08-20

## 2020-08-20 RX ADMIN — TESTOSTERONE CYPIONATE 100 MG: 200 INJECTION, SOLUTION INTRAMUSCULAR at 11:54

## 2020-09-11 ENCOUNTER — CLINICAL SUPPORT (OUTPATIENT)
Dept: FAMILY MEDICINE CLINIC | Facility: CLINIC | Age: 35
End: 2020-09-11

## 2020-09-11 DIAGNOSIS — Z51.81 THERAPEUTIC DRUG MONITORING: Primary | ICD-10-CM

## 2020-09-11 DIAGNOSIS — R79.89 LOW TESTOSTERONE: ICD-10-CM

## 2020-09-11 PROCEDURE — 96372 THER/PROPH/DIAG INJ SC/IM: CPT | Performed by: FAMILY MEDICINE

## 2020-09-11 RX ORDER — TESTOSTERONE CYPIONATE 200 MG/ML
200 INJECTION, SOLUTION INTRAMUSCULAR ONCE
Status: COMPLETED | OUTPATIENT
Start: 2020-09-11 | End: 2020-09-11

## 2020-09-11 RX ADMIN — TESTOSTERONE CYPIONATE 200 MG: 200 INJECTION, SOLUTION INTRAMUSCULAR at 16:16

## 2020-09-14 ENCOUNTER — TELEPHONE (OUTPATIENT)
Dept: FAMILY MEDICINE CLINIC | Facility: CLINIC | Age: 35
End: 2020-09-14

## 2020-09-14 NOTE — TELEPHONE ENCOUNTER
Patient canceled his appointment today to follow-up on his testosterone hypertension.  Please call to schedule an office visit.

## 2020-09-23 ENCOUNTER — OFFICE VISIT (OUTPATIENT)
Dept: FAMILY MEDICINE CLINIC | Facility: CLINIC | Age: 35
End: 2020-09-23

## 2020-09-23 ENCOUNTER — LAB (OUTPATIENT)
Dept: LAB | Facility: HOSPITAL | Age: 35
End: 2020-09-23

## 2020-09-23 VITALS
HEIGHT: 71 IN | BODY MASS INDEX: 37.38 KG/M2 | HEART RATE: 78 BPM | WEIGHT: 267 LBS | OXYGEN SATURATION: 98 % | SYSTOLIC BLOOD PRESSURE: 136 MMHG | DIASTOLIC BLOOD PRESSURE: 84 MMHG

## 2020-09-23 DIAGNOSIS — I10 ESSENTIAL HYPERTENSION: Primary | ICD-10-CM

## 2020-09-23 DIAGNOSIS — R79.89 LOW TESTOSTERONE: ICD-10-CM

## 2020-09-23 DIAGNOSIS — E66.09 CLASS 2 OBESITY DUE TO EXCESS CALORIES WITHOUT SERIOUS COMORBIDITY WITH BODY MASS INDEX (BMI) OF 36.0 TO 36.9 IN ADULT: ICD-10-CM

## 2020-09-23 DIAGNOSIS — Z23 FLU VACCINE NEED: ICD-10-CM

## 2020-09-23 PROBLEM — E66.812 CLASS 2 OBESITY DUE TO EXCESS CALORIES WITHOUT SERIOUS COMORBIDITY WITH BODY MASS INDEX (BMI) OF 36.0 TO 36.9 IN ADULT: Status: ACTIVE | Noted: 2020-02-22

## 2020-09-23 LAB — TESTOST SERPL-MCNC: 390 NG/DL (ref 249–836)

## 2020-09-23 PROCEDURE — 84403 ASSAY OF TOTAL TESTOSTERONE: CPT

## 2020-09-23 PROCEDURE — 36415 COLL VENOUS BLD VENIPUNCTURE: CPT

## 2020-09-23 PROCEDURE — 90686 IIV4 VACC NO PRSV 0.5 ML IM: CPT | Performed by: FAMILY MEDICINE

## 2020-09-23 PROCEDURE — 99213 OFFICE O/P EST LOW 20 MIN: CPT | Performed by: FAMILY MEDICINE

## 2020-09-23 PROCEDURE — 90471 IMMUNIZATION ADMIN: CPT | Performed by: FAMILY MEDICINE

## 2020-09-23 RX ORDER — TESTOSTERONE CYPIONATE 200 MG/ML
INJECTION, SOLUTION INTRAMUSCULAR
COMMUNITY
Start: 2020-07-21 | End: 2020-12-23 | Stop reason: SDUPTHER

## 2020-09-23 NOTE — PROGRESS NOTES
Chief Complaint   Patient presents with   • Hypertension   • Low Testosterone        Hypertension  This is a chronic problem. The problem is controlled. Pertinent negatives include no chest pain or palpitations. Current antihypertension treatment includes ACE inhibitors.      His first injection testosterone 100 mg was given 7/21/2020.  His second injection testosterone 100 mg was given 8/20/2020. Patient's most recent testosterone injection 200 mg was given 9/11/2020.  Today is his first follow-up visit since initiating testosterone therapy in July. He's had more energy and sex drive with increased dose.     He's not working. Decided to go back to school online. Less time to take walk. Eating more fast food.     Headache yesterday. Treated with excedrin and improved.     Review of Systems   Constitutional: Negative for fatigue.   Cardiovascular: Negative for chest pain and palpitations.   Genitourinary: Negative for decreased libido.   Neurological: Positive for headache.        Patient Active Problem List   Diagnosis   • Depression, major, recurrent, moderate (CMS/HCC)   • Snoring   • Essential hypertension   • Class 2 obesity due to excess calories without serious comorbidity with body mass index (BMI) of 36.0 to 36.9 in adult   • JERI (generalized anxiety disorder)   • Hypertriglyceridemia   • PLMD (periodic limb movement disorder)   • Obstructive sleep apnea, adult   • Major depression in complete remission (CMS/HCC)   • Low testosterone   • Controlled substance agreement signed       Current Outpatient Medications   Medication Sig Dispense Refill   • buPROPion XL (WELLBUTRIN XL) 300 MG 24 hr tablet Take 1 tablet by mouth Daily.     • lansoprazole (PREVACID) 15 MG capsule Take 15 mg by mouth Daily.     • lisinopril (PRINIVIL,ZESTRIL) 20 MG tablet Take 1 tablet by mouth Daily. 30 tablet 5   • Testosterone Cypionate (DEPOTESTOTERONE CYPIONATE) 200 MG/ML injection INJECT 1 ML INTO THE APPROPRIATE MUSCLE Q 30 DAYS  "UTD BY PRESCRIBER       No current facility-administered medications for this visit.        Allergies   Allergen Reactions   • Sulfa Antibiotics GI Intolerance       Past Medical History:   Diagnosis Date   • Anxiety    • Arthritis    • Depression    • HTN (hypertension)    • Hypertriglyceridemia 2020   • Wears glasses         Past Surgical History:   Procedure Laterality Date   • VASECTOMY          Family History   Problem Relation Age of Onset   • Mental illness Mother         bipolar vs depression   • Arthritis Father    • Hyperlipidemia Paternal Grandfather    • Hypertension Paternal Grandfather    • Heart attack Paternal Grandfather    • Stroke Paternal Grandfather    • Colon polyps Neg Hx    • Colon cancer Neg Hx         Social History     Socioeconomic History   • Marital status: Single     Spouse name: Julienne Patel   • Number of children: Not on file   • Years of education: Not on file   • Highest education level: Not on file   Occupational History   • Occupation: student   Tobacco Use   • Smoking status: Former Smoker     Packs/day: 0.25     Years: 12.00     Pack years: 3.00     Types: Cigars, Cigarettes     Quit date:      Years since quittin.7   • Smokeless tobacco: Never Used   • Tobacco comment: never been a regular smoker, very little tobacco use. reports maybe 2 cigarettes per month.   Substance and Sexual Activity   • Alcohol use: Yes     Alcohol/week: 3.0 - 4.0 standard drinks     Types: 3 - 4 Glasses of wine per week   • Drug use: Not Currently     Types: Marijuana        Vitals:    20 0844   BP: 136/84   Pulse: 78   SpO2: 98%   Weight: 121 kg (267 lb)   Height: 181.6 cm (71.5\")      Body mass index is 36.72 kg/m².    Physical Exam  Vitals signs reviewed.   Constitutional:       General: He is not in acute distress.     Appearance: He is obese.   Cardiovascular:      Rate and Rhythm: Normal rate and regular rhythm.      Heart sounds: No murmur.   Pulmonary:      Effort: " Pulmonary effort is normal.      Breath sounds: Normal breath sounds.   Psychiatric:         Mood and Affect: Mood normal.         Behavior: Behavior normal.         Thought Content: Thought content normal.         Judgment: Judgment normal.         Results for orders placed or performed in visit on 08/14/20   Lipid Panel    Specimen: Blood   Result Value Ref Range    Total Cholesterol 204 (H) 0 - 200 mg/dL    Triglycerides 208 (H) 0 - 150 mg/dL    HDL Cholesterol 33 (L) 40 - 60 mg/dL    LDL Cholesterol  129 (H) 0 - 100 mg/dL    VLDL Cholesterol 41.6 (H) 5 - 40 mg/dL    LDL/HDL Ratio 3.92    Testosterone    Specimen: Blood   Result Value Ref Range    Testosterone, Total 207.00 (L) 249.00 - 836.00 ng/dL   Testosterone (Free & Total), LC / MS    Specimen: Blood   Result Value Ref Range    Testosterone, Total 256.9 (L) 264.0 - 916.0 ng/dL    Testosterone, Free 7.1 (L) 8.7 - 25.1 pg/mL   CBC Auto Differential    Specimen: Blood   Result Value Ref Range    WBC 7.71 3.40 - 10.80 10*3/mm3    RBC 4.66 4.14 - 5.80 10*6/mm3    Hemoglobin 14.1 13.0 - 17.7 g/dL    Hematocrit 41.1 37.5 - 51.0 %    MCV 88.2 79.0 - 97.0 fL    MCH 30.3 26.6 - 33.0 pg    MCHC 34.3 31.5 - 35.7 g/dL    RDW 12.7 12.3 - 15.4 %    RDW-SD 40.7 37.0 - 54.0 fl    MPV 11.8 6.0 - 12.0 fL    Platelets 387 140 - 450 10*3/mm3    Neutrophil % 58.9 42.7 - 76.0 %    Lymphocyte % 31.0 19.6 - 45.3 %    Monocyte % 7.4 5.0 - 12.0 %    Eosinophil % 1.6 0.3 - 6.2 %    Basophil % 0.8 0.0 - 1.5 %    Immature Grans % 0.3 0.0 - 0.5 %    Neutrophils, Absolute 4.55 1.70 - 7.00 10*3/mm3    Lymphocytes, Absolute 2.39 0.70 - 3.10 10*3/mm3    Monocytes, Absolute 0.57 0.10 - 0.90 10*3/mm3    Eosinophils, Absolute 0.12 0.00 - 0.40 10*3/mm3    Basophils, Absolute 0.06 0.00 - 0.20 10*3/mm3    Immature Grans, Absolute 0.02 0.00 - 0.05 10*3/mm3    nRBC 0.0 0.0 - 0.2 /100 WBC      Component      Latest Ref Rng & Units 7/1/2020 7/13/2020 8/14/2020 8/14/2020            10:06 AM 10:06 AM    Testosterone, Total      249.00 - 836.00 ng/dL 242.00 (L) 215.00 (L) 207.00 (L) 256.9 (L)   Testosterone, Free      8.7 - 25.1 pg/mL    7.1 (L)     Immunization History   Administered Date(s) Administered   • Flulaval/Fluarix Quad 09/23/2020   • Influenza, Unspecified 08/15/2019       Frank was seen today for hypertension and low testosterone.    Diagnoses and all orders for this visit:    Essential hypertension  Stable.  Continue lisinopril.  Low testosterone  -     Testosterone; Future  Symptomatic improvement with increased dose of testosterone 200 mg.  Check labs today.  Plan to continue testosterone 200 mg every 30 days.  Class 2 obesity due to excess calories without serious comorbidity with body mass index (BMI) of 36.0 to 36.9 in adult  Patient's Body mass index is 36.72 kg/m². BMI is above normal parameters. Recommendations include: nutrition counseling.    Flu vaccine need  -     Fluarix/Fluzone/Afluria Quad>6 Months        Return in about 3 months (around 12/23/2020) for Office visit low testosterone.    Dr. Magaly De Jesus

## 2020-10-09 ENCOUNTER — CLINICAL SUPPORT (OUTPATIENT)
Dept: FAMILY MEDICINE CLINIC | Facility: CLINIC | Age: 35
End: 2020-10-09

## 2020-10-09 DIAGNOSIS — R79.89 LOW TESTOSTERONE: Primary | ICD-10-CM

## 2020-10-09 PROCEDURE — 96372 THER/PROPH/DIAG INJ SC/IM: CPT | Performed by: FAMILY MEDICINE

## 2020-10-09 RX ORDER — TESTOSTERONE CYPIONATE 200 MG/ML
50 INJECTION, SOLUTION INTRAMUSCULAR
Status: DISCONTINUED | OUTPATIENT
Start: 2020-10-09 | End: 2020-12-23

## 2020-10-09 RX ADMIN — TESTOSTERONE CYPIONATE 50 MG: 200 INJECTION, SOLUTION INTRAMUSCULAR at 15:15

## 2020-10-29 ENCOUNTER — OFFICE VISIT (OUTPATIENT)
Dept: FAMILY MEDICINE CLINIC | Facility: CLINIC | Age: 35
End: 2020-10-29

## 2020-10-29 VITALS
OXYGEN SATURATION: 97 % | SYSTOLIC BLOOD PRESSURE: 162 MMHG | DIASTOLIC BLOOD PRESSURE: 84 MMHG | BODY MASS INDEX: 36.24 KG/M2 | WEIGHT: 267.6 LBS | HEIGHT: 72 IN | HEART RATE: 87 BPM

## 2020-10-29 DIAGNOSIS — I10 ESSENTIAL HYPERTENSION: Primary | ICD-10-CM

## 2020-10-29 DIAGNOSIS — F90.2 ATTENTION DEFICIT HYPERACTIVITY DISORDER (ADHD), COMBINED TYPE: ICD-10-CM

## 2020-10-29 PROCEDURE — 99214 OFFICE O/P EST MOD 30 MIN: CPT | Performed by: FAMILY MEDICINE

## 2020-10-29 RX ORDER — ATOMOXETINE 40 MG/1
40 CAPSULE ORAL DAILY
Qty: 30 CAPSULE | Refills: 1 | Status: SHIPPED | OUTPATIENT
Start: 2020-10-29 | End: 2020-11-02 | Stop reason: CLARIF

## 2020-10-29 NOTE — PROGRESS NOTES
Chief Complaint   Patient presents with   • ADHD     discuss evaluation        HPI     Missed dose of blood pressure medication this morning. Counselor did an evaluation for ADHD. He's never taken medication for ADHD. He noticed symptoms going back to first grade. He graduated with 3.5 GPA, but got worse in college. Parents had to be constantly on top of him. He has Bachelor's degree with GPA 2.75. Difficult to stay focused and keep up with work. Late with assignments. Home life very forgetful, constantly loosing things, not doing simple things around the house. He has super focus zone in on something. Irritable when his concentration is broken. Whenever he's talking he's constantly doing something with his hands. He picks his nails.         Review of Systems   Constitutional: Negative for appetite change.   Cardiovascular: Negative for chest pain and palpitations.   Psychiatric/Behavioral: Positive for decreased concentration. Negative for sleep disturbance.        Patient Active Problem List   Diagnosis   • Depression, major, recurrent, moderate (CMS/HCC)   • Snoring   • Essential hypertension   • Class 2 obesity due to excess calories without serious comorbidity with body mass index (BMI) of 36.0 to 36.9 in adult   • JERI (generalized anxiety disorder)   • Hypertriglyceridemia   • PLMD (periodic limb movement disorder)   • Obstructive sleep apnea, adult   • Major depression in complete remission (CMS/HCC)   • Low testosterone   • Controlled substance agreement signed   • Attention deficit hyperactivity disorder (ADHD), combined type       Current Outpatient Medications   Medication Sig Dispense Refill   • buPROPion XL (WELLBUTRIN XL) 300 MG 24 hr tablet Take 1 tablet by mouth Daily.     • lansoprazole (PREVACID) 15 MG capsule Take 15 mg by mouth Daily.     • lisinopril (PRINIVIL,ZESTRIL) 20 MG tablet Take 1 tablet by mouth Daily. 30 tablet 5   • Testosterone Cypionate (DEPOTESTOTERONE CYPIONATE) 200 MG/ML  injection INJECT 1 ML INTO THE APPROPRIATE MUSCLE Q 30 DAYS UTD BY PRESCRIBER       Current Facility-Administered Medications   Medication Dose Route Frequency Provider Last Rate Last Dose   • Testosterone Cypionate (DEPOTESTOTERONE CYPIONATE) injection 50 mg  50 mg Intramuscular Q30 Days Abhilash Titus DO   50 mg at 10/09/20 1515       Allergies   Allergen Reactions   • Sulfa Antibiotics GI Intolerance       Past Medical History:   Diagnosis Date   • Anxiety    • Arthritis    • Depression    • HTN (hypertension)    • Hypertriglyceridemia 2020   • Wears glasses         Past Surgical History:   Procedure Laterality Date   • VASECTOMY          Family History   Problem Relation Age of Onset   • Mental illness Mother         bipolar vs depression   • Arthritis Father    • Hyperlipidemia Paternal Grandfather    • Hypertension Paternal Grandfather    • Heart attack Paternal Grandfather    • Stroke Paternal Grandfather    • Colon polyps Neg Hx    • Colon cancer Neg Hx         Social History     Socioeconomic History   • Marital status: Single     Spouse name: Julienne Patel   • Number of children: Not on file   • Years of education: Not on file   • Highest education level: Not on file   Occupational History   • Occupation: [employer]    Tobacco Use   • Smoking status: Former Smoker     Packs/day: 0.25     Years: 12.00     Pack years: 3.00     Types: Cigars, Cigarettes     Quit date:      Years since quittin.8   • Smokeless tobacco: Never Used   • Tobacco comment: never been a regular smoker, very little tobacco use. reports maybe 2 cigarettes per month.   Substance and Sexual Activity   • Alcohol use: Yes     Alcohol/week: 3.0 - 4.0 standard drinks     Types: 3 - 4 Glasses of wine per week   • Drug use: Not Currently     Types: Marijuana        Vitals:    10/29/20 1121   BP: 162/84   BP Location: Left arm   Patient Position: Sitting   Cuff Size: Adult   Pulse: 87   SpO2: 97%   Weight: 121 kg (267 lb 9.6  "oz)   Height: 181.6 cm (71.5\")      Body mass index is 36.8 kg/m².    Physical Exam  Vitals signs reviewed.   Constitutional:       General: He is not in acute distress.     Appearance: He is obese. He is not ill-appearing.   Cardiovascular:      Rate and Rhythm: Normal rate and regular rhythm.      Heart sounds: No murmur.   Pulmonary:      Effort: Pulmonary effort is normal.      Breath sounds: Normal breath sounds.   Neurological:      Mental Status: He is alert.   Psychiatric:         Mood and Affect: Mood normal.         Behavior: Behavior normal.         Thought Content: Thought content normal.         Judgment: Judgment normal.              Diagnoses and all orders for this visit:    1. Essential hypertension (Primary)  Uncontrolled with stage II hypertension.  Discussed medication compliance.  With new diagnosis of ADHD discussed with patient will need to avoid stimulant medications.  2. Attention deficit hyperactivity disorder (ADHD), combined type  -     atomoxetine (Strattera) 40 MG capsule; Take 1 capsule by mouth Daily.  Dispense: 30 capsule; Refill: 1  New.  Psychological records requested.  He has had many symptoms of ADHD with an onset in childhood.  Avoid stimulants due to his hypertension.  He has had an adequate relief with bupropion.  At this time start to Strattera 40 mg.  Reassess after 4 weeks.      Return in about 1 month (around 11/29/2020) for Video visit ADHD.    Dr. Magaly De Jesus  "

## 2020-10-29 NOTE — PATIENT INSTRUCTIONS
Atomoxetine capsules  What is this medicine?  ATOMOXETINE (AT oh mox e teen) is used to treat attention deficit/hyperactivity disorder, also known as ADHD. It is not a stimulant like other drugs for ADHD. This drug can improve attention span, concentration, and emotional control. It can also reduce restless or overactive behavior.  This medicine may be used for other purposes; ask your health care provider or pharmacist if you have questions.  COMMON BRAND NAME(S): Strattera  What should I tell my health care provider before I take this medicine?  They need to know if you have any of these conditions:  · glaucoma  · high or low blood pressure  · history of stroke  · irregular heartbeat or other cardiac disease  · liver disease  · chelsie or bipolar disorder  · pheochromocytoma  · suicidal thoughts  · an unusual or allergic reaction to atomoxetine, other medicines, foods, dyes, or preservatives  · pregnant or trying to get pregnant  · breast-feeding  How should I use this medicine?  Take this medicine by mouth with a glass of water. Follow the directions on the prescription label. You can take it with or without food. If it upsets your stomach, take it with food. If you have difficulty sleeping and you take more than 1 dose per day, take your last dose before 6 PM. Take your medicine at regular intervals. Do not take it more often than directed. Do not stop taking except on your doctor's advice.  A special MedGuide will be given to you by the pharmacist with each prescription and refill. Be sure to read this information carefully each time.  Talk to your pediatrician regarding the use of this medicine in children. While this drug may be prescribed for children as young as 6 years for selected conditions, precautions do apply.  Overdosage: If you think you have taken too much of this medicine contact a poison control center or emergency room at once.  NOTE: This medicine is only for you. Do not share this medicine with  others.  What if I miss a dose?  If you miss a dose, take it as soon as you can. If it is almost time for your next dose, take only that dose. Do not take double or extra doses.  What may interact with this medicine?  Do not take this medicine with any of the following medications:  · cisapride  · dronedarone  · MAOIs like Carbex, Eldepryl, Marplan, Nardil, and Parnate  · pimozide  · reboxetine  · thioridazine  This medicine may also interact with the following medications:  · certain medicines for blood pressure, heart disease, irregular heart beat  · certain medicines for depression, anxiety, or psychotic disturbances  · certain medicines for lung disease like albuterol  · cold or allergy medicines  · dofetilide  · fluoxetine  · medicines that increase blood pressure like dopamine, dobutamine, or ephedrine  · other medicines that prolong the QT interval (cause an abnormal heart rhythm)  · paroxetine  · quinidine  · stimulant medicines for attention disorders, weight loss, or to stay awake  · ziprasidone  This list may not describe all possible interactions. Give your health care provider a list of all the medicines, herbs, non-prescription drugs, or dietary supplements you use. Also tell them if you smoke, drink alcohol, or use illegal drugs. Some items may interact with your medicine.  What should I watch for while using this medicine?  It may take a week or more for this medicine to take effect. This is why it is very important to continue taking the medicine and not miss any doses. If you have been taking this medicine regularly for some time, do not suddenly stop taking it. Ask your doctor or health care professional for advice.  Rarely, this medicine may increase thoughts of suicide or suicide attempts in children and teenagers. Call your child's health care professional right away if your child or teenager has new or increased thoughts of suicide or has changes in mood or behavior like becoming irritable or  anxious. Regularly monitor your child for these behavioral changes.  For males, contact you doctor or health care professional right away if you have an erection that lasts longer than 4 hours or if it becomes painful. This may be a sign of serious problem and must be treated right away to prevent permanent damage.  You may get drowsy or dizzy. Do not drive, use machinery, or do anything that needs mental alertness until you know how this medicine affects you. Do not stand or sit up quickly, especially if you are an older patient. This reduces the risk of dizzy or fainting spells. Alcohol can make you more drowsy and dizzy. Avoid alcoholic drinks.  Do not treat yourself for coughs, colds or allergies without asking your doctor or health care professional for advice. Some ingredients can increase possible side effects.  Your mouth may get dry. Chewing sugarless gum or sucking hard candy, and drinking plenty of water will help.  What side effects may I notice from receiving this medicine?  Side effects that you should report to your doctor or health care professional as soon as possible:  · allergic reactions like skin rash, itching or hives, swelling of the face, lips, or tongue  · breathing problems  · chest pain  · dark urine  · fast, irregular heartbeat  · general ill feeling or flu-like symptoms  · high blood pressure  · males: prolonged or painful erection  · stomach pain or tenderness  · trouble passing urine or change in the amount of urine  · vomiting  · weight loss  · yellowing of the eyes or skin  Side effects that usually do not require medical attention (report to your doctor or health care professional if they continue or are bothersome):  · change in sex drive or performance  · constipation or diarrhea  · headache  · loss of appetite  · menstrual period irregularities  · nausea  · stomach upset  This list may not describe all possible side effects. Call your doctor for medical advice about side effects.  You may report side effects to FDA at 5-144-KYO-3566.  Where should I keep my medicine?  Keep out of the reach of children.  Store at room temperature between 15 and 30 degrees C (59 and 86 degrees F). Throw away any unused medication after the expiration date.  NOTE: This sheet is a summary. It may not cover all possible information. If you have questions about this medicine, talk to your doctor, pharmacist, or health care provider.  © 2020 [employer] (2019-11-20 15:02:07)

## 2020-10-30 ENCOUNTER — PRIOR AUTHORIZATION (OUTPATIENT)
Dept: FAMILY MEDICINE CLINIC | Facility: CLINIC | Age: 35
End: 2020-10-30

## 2020-10-30 NOTE — TELEPHONE ENCOUNTER
Frank Champion (Key: [id number]) - [id number]  Atomoxetine HCl 40MG capsules  Status: Sent to Plan    Created: October 29th, 2020 [phone]    Sent: October 30th, 2020

## 2020-11-02 RX ORDER — GUANFACINE 1 MG/1
1 TABLET ORAL NIGHTLY
Qty: 30 TABLET | Refills: 2 | Status: SHIPPED | OUTPATIENT
Start: 2020-11-02 | End: 2020-11-20

## 2020-11-13 ENCOUNTER — CLINICAL SUPPORT (OUTPATIENT)
Dept: FAMILY MEDICINE CLINIC | Facility: CLINIC | Age: 35
End: 2020-11-13

## 2020-11-13 DIAGNOSIS — R79.89 LOW TESTOSTERONE: Primary | ICD-10-CM

## 2020-11-13 PROCEDURE — 96372 THER/PROPH/DIAG INJ SC/IM: CPT | Performed by: FAMILY MEDICINE

## 2020-11-13 RX ADMIN — TESTOSTERONE CYPIONATE 50 MG: 200 INJECTION, SOLUTION INTRAMUSCULAR at 12:30

## 2020-11-20 ENCOUNTER — TELEPHONE (OUTPATIENT)
Dept: FAMILY MEDICINE CLINIC | Facility: CLINIC | Age: 35
End: 2020-11-20

## 2020-11-20 NOTE — TELEPHONE ENCOUNTER
PATIENT CALLED WITH QUESTION REGARDING   guanFACINE (TENEX) 1 MG tablet    IT IS MAKING PATIENT SLEEPY DURING THE DAY.  IS NOT SURE THAT IT IS WORKING THE WAY IT SHOULD.  SHOULD PATIENT CONTINUE TAKING OR SHOULD HE STOP TAKING AND IF HE SHOULD STOP TAKING DOES HE NEED TO DO SO GRADUALLY OR CAN HE JUST STOP TAKING IT?      PLEASE CALL AND ADVISE: [phone]

## 2020-11-21 NOTE — TELEPHONE ENCOUNTER
OK to stop medication. Sedation can be a side effect. No need to taper.       Please submit PA for Strattera again. Add that he had side effect with guanfacine.

## 2020-11-21 NOTE — TELEPHONE ENCOUNTER
Patient notified.     Strattera will need to be sent to the pharmacy and rejected before we can resubmit a PA. The prior script and PA has been canceled due to denial.

## 2020-11-23 RX ORDER — ATOMOXETINE 40 MG/1
40 CAPSULE ORAL DAILY
Qty: 30 CAPSULE | Refills: 0 | Status: SHIPPED | OUTPATIENT
Start: 2020-11-23 | End: 2020-12-23 | Stop reason: SDUPTHER

## 2020-11-23 NOTE — TELEPHONE ENCOUNTER
Frank Champion (Key: [id number]) - [id number]  Atomoxetine HCl 40MG capsules  Status: Sent to Plan  Created: November 23rd, 2020 [phone]  Sent: November 23rd, 2020

## 2020-12-10 ENCOUNTER — TELEPHONE (OUTPATIENT)
Dept: FAMILY MEDICINE CLINIC | Facility: CLINIC | Age: 35
End: 2020-12-10

## 2020-12-10 ENCOUNTER — TELEMEDICINE (OUTPATIENT)
Dept: FAMILY MEDICINE CLINIC | Facility: CLINIC | Age: 35
End: 2020-12-10

## 2020-12-10 DIAGNOSIS — F90.2 ATTENTION DEFICIT HYPERACTIVITY DISORDER (ADHD), COMBINED TYPE: Primary | ICD-10-CM

## 2020-12-10 DIAGNOSIS — R79.89 LOW TESTOSTERONE: ICD-10-CM

## 2020-12-10 DIAGNOSIS — I10 ESSENTIAL HYPERTENSION: ICD-10-CM

## 2020-12-10 PROCEDURE — 99214 OFFICE O/P EST MOD 30 MIN: CPT | Performed by: FAMILY MEDICINE

## 2020-12-10 RX ORDER — LISINOPRIL 20 MG/1
20 TABLET ORAL DAILY
Qty: 30 TABLET | Refills: 5 | Status: SHIPPED | OUTPATIENT
Start: 2020-12-10 | End: 2021-04-15

## 2020-12-10 NOTE — TELEPHONE ENCOUNTER
Please fax today's office note [facility]

## 2020-12-10 NOTE — PROGRESS NOTES
Telehealth video visit.     Chief Complaint   Patient presents with   • ADHD        HPI     Health has been pretty good. Guanfacine didn't work out for him because it made him sleepy. Taking it at night he was drowsy until the next afternoon and lethargic. He tried it for 2 weeks. He still suffers with decreased concentration and focus affecting day to day activities. He has counseling every 1-2 weeks working on issues with anxiety, depression and coping with symptoms of ADHD.     Last testosterone injection 11/13/20.  He needs to  new script before he can come in for his next injection.     Home blood pressure monitoring has been normal 127/86.         Review of Systems   Constitutional: Positive for fatigue.   Psychiatric/Behavioral: Positive for decreased concentration and depressed mood. The patient is nervous/anxious.         Patient Active Problem List   Diagnosis   • Depression, major, recurrent, moderate (CMS/HCC)   • Snoring   • Essential hypertension   • Class 2 obesity due to excess calories without serious comorbidity with body mass index (BMI) of 36.0 to 36.9 in adult   • JERI (generalized anxiety disorder)   • Hypertriglyceridemia   • PLMD (periodic limb movement disorder)   • Obstructive sleep apnea, adult   • Major depression in complete remission (CMS/HCC)   • Low testosterone   • Controlled substance agreement signed   • Attention deficit hyperactivity disorder (ADHD), combined type       Current Outpatient Medications   Medication Sig Dispense Refill   • atomoxetine (Strattera) 40 MG capsule Take 1 capsule by mouth Daily. 30 capsule 0   • buPROPion XL (WELLBUTRIN XL) 300 MG 24 hr tablet Take 1 tablet by mouth Daily.     • lansoprazole (PREVACID) 15 MG capsule Take 15 mg by mouth Daily.     • lisinopril (PRINIVIL,ZESTRIL) 20 MG tablet Take 1 tablet by mouth Daily. 30 tablet 5   • Testosterone Cypionate (DEPOTESTOTERONE CYPIONATE) 200 MG/ML injection INJECT 1 ML INTO THE APPROPRIATE MUSCLE Q  30 DAYS UTD BY PRESCRIBER       Current Facility-Administered Medications   Medication Dose Route Frequency Provider Last Rate Last Admin   • Testosterone Cypionate (DEPOTESTOTERONE CYPIONATE) injection 50 mg  50 mg Intramuscular Q30 Days Abhilash Titus DO   50 mg at 20 1230       Allergies   Allergen Reactions   • Guanfacine Other (See Comments)     sedation   • Sulfa Antibiotics GI Intolerance       Past Medical History:   Diagnosis Date   • Anxiety    • Arthritis    • Depression    • HTN (hypertension)    • Hypertriglyceridemia 2020   • Wears glasses         Past Surgical History:   Procedure Laterality Date   • VASECTOMY          Family History   Problem Relation Age of Onset   • Mental illness Mother         bipolar vs depression   • Arthritis Father    • Hyperlipidemia Paternal Grandfather    • Hypertension Paternal Grandfather    • Heart attack Paternal Grandfather    • Stroke Paternal Grandfather    • Colon polyps Neg Hx    • Colon cancer Neg Hx         Social History     Socioeconomic History   • Marital status: Single     Spouse name: Julienne Patel   • Number of children: Not on file   • Years of education: Not on file   • Highest education level: Not on file   Occupational History   • Occupation: [employer]    Tobacco Use   • Smoking status: Former Smoker     Packs/day: 0.25     Years: 12.00     Pack years: 3.00     Types: Cigars, Cigarettes     Quit date: 2019     Years since quittin.9   • Smokeless tobacco: Never Used   • Tobacco comment: never been a regular smoker, very little tobacco use. reports maybe 2 cigarettes per month.   Substance and Sexual Activity   • Alcohol use: Yes     Alcohol/week: 3.0 - 4.0 standard drinks     Types: 3 - 4 Glasses of wine per week   • Drug use: Not Currently     Types: Marijuana        There were no vitals filed for this visit.   There is no height or weight on file to calculate BMI.    Physical Exam  Constitutional:       General: He is not in  acute distress.     Appearance: He is obese. He is not ill-appearing.   HENT:      Right Ear: Hearing normal.      Left Ear: Hearing normal.   Eyes:      General:         Right eye: No discharge.         Left eye: No discharge.      Conjunctiva/sclera: Conjunctivae normal.   Pulmonary:      Effort: Pulmonary effort is normal. No respiratory distress.   Neurological:      Mental Status: He is alert and oriented to person, place, and time.   Psychiatric:         Mood and Affect: Mood normal.         Behavior: Behavior normal. Behavior is cooperative.         Thought Content: Thought content normal.         Judgment: Judgment normal.         Diagnoses and all orders for this visit:    1. Attention deficit hyperactivity disorder (ADHD), combined type (Primary)  Uncontrolled.  Patient has tried Wellbutrin without benefit for his ADHD symptoms.  He is also done counseling without improvement.  He was most recently prescribed guanfacine but had side effects of oversedation.  He is unable to take the stimulant class of medication such as Adderall, Concerta due to hypertension and morbid obesity.  I believe he would benefit for use of Strattera and will need to submit authorization through his insurance company.  2. Essential hypertension  -     lisinopril (PRINIVIL,ZESTRIL) 20 MG tablet; Take 1 tablet by mouth Daily.  Dispense: 30 tablet; Refill: 5  At this time continue lisinopril.  Recheck when he is next in the office.  3. Low testosterone  Continue testosterone therapy.  He will come in for nurse visit once he picks up his next vial.  He receives monthly injections.  Most recent testosterone level in September was in the normal range following treatment.  Recheck in 3 months.  The treatment plan includes a controlled substance.  Controlled Substance Medication Agreement signed and on file 7/21/2020.  Risks, benefits, and alternative treatments reviewed.  RSOA report has been reviewed.         Return in about 3 months  (around 3/10/2021) for Office visit testosterone.    Start of visit 7:32 am. End of visit 7:40 am .

## 2020-12-23 ENCOUNTER — OFFICE VISIT (OUTPATIENT)
Dept: FAMILY MEDICINE CLINIC | Facility: CLINIC | Age: 35
End: 2020-12-23

## 2020-12-23 VITALS
HEART RATE: 96 BPM | SYSTOLIC BLOOD PRESSURE: 123 MMHG | HEIGHT: 71 IN | DIASTOLIC BLOOD PRESSURE: 86 MMHG | BODY MASS INDEX: 36.12 KG/M2 | WEIGHT: 258 LBS | OXYGEN SATURATION: 98 %

## 2020-12-23 DIAGNOSIS — F90.2 ATTENTION DEFICIT HYPERACTIVITY DISORDER (ADHD), COMBINED TYPE: ICD-10-CM

## 2020-12-23 DIAGNOSIS — Z51.81 THERAPEUTIC DRUG MONITORING: ICD-10-CM

## 2020-12-23 DIAGNOSIS — R79.89 LOW TESTOSTERONE: Primary | ICD-10-CM

## 2020-12-23 DIAGNOSIS — I10 ESSENTIAL HYPERTENSION: ICD-10-CM

## 2020-12-23 PROCEDURE — 96372 THER/PROPH/DIAG INJ SC/IM: CPT | Performed by: FAMILY MEDICINE

## 2020-12-23 PROCEDURE — 99214 OFFICE O/P EST MOD 30 MIN: CPT | Performed by: FAMILY MEDICINE

## 2020-12-23 RX ORDER — TESTOSTERONE CYPIONATE 200 MG/ML
200 INJECTION, SOLUTION INTRAMUSCULAR
Qty: 10 ML | Refills: 0 | Status: SHIPPED | OUTPATIENT
Start: 2020-12-23 | End: 2021-04-15

## 2020-12-23 RX ORDER — TESTOSTERONE CYPIONATE 200 MG/ML
200 INJECTION, SOLUTION INTRAMUSCULAR
Status: DISCONTINUED | OUTPATIENT
Start: 2020-12-23 | End: 2021-04-15

## 2020-12-23 RX ORDER — ATOMOXETINE 40 MG/1
40 CAPSULE ORAL DAILY
Qty: 30 CAPSULE | Refills: 1 | Status: SHIPPED | OUTPATIENT
Start: 2020-12-23 | End: 2021-01-04

## 2020-12-23 RX ADMIN — TESTOSTERONE CYPIONATE 200 MG: 200 INJECTION, SOLUTION INTRAMUSCULAR at 09:09

## 2020-12-23 NOTE — PROGRESS NOTES
Chief Complaint   Patient presents with   • Low Testosterone     . UDS in office today        HPI     No missed doses of blood pressure medication. Asymptomatic other than sweating and flushing this morning. With testosterone his energy level is increased and better sex drive. He has been taking new Strattera for about a week. For a couple days he was foggy, but not since. Easier to sit and pay attention to people, his brain is no longer wondering.         Review of Systems   Constitutional: Positive for diaphoresis. Negative for fatigue.   Cardiovascular:        Flushing   Genitourinary: Negative for decreased libido.   Psychiatric/Behavioral: Negative for decreased concentration.        Patient Active Problem List   Diagnosis   • Depression, major, recurrent, moderate (CMS/HCC)   • Snoring   • Essential hypertension   • Class 2 obesity due to excess calories without serious comorbidity with body mass index (BMI) of 36.0 to 36.9 in adult   • JERI (generalized anxiety disorder)   • Hypertriglyceridemia   • PLMD (periodic limb movement disorder)   • Obstructive sleep apnea, adult   • Major depression in complete remission (CMS/HCC)   • Low testosterone   • Controlled substance agreement signed   • Attention deficit hyperactivity disorder (ADHD), combined type       Current Outpatient Medications   Medication Sig Dispense Refill   • atomoxetine (Strattera) 40 MG capsule Take 1 capsule by mouth Daily. 30 capsule 0   • buPROPion XL (WELLBUTRIN XL) 300 MG 24 hr tablet Take 1 tablet by mouth Daily.     • lansoprazole (PREVACID) 15 MG capsule Take 15 mg by mouth Daily.     • lisinopril (PRINIVIL,ZESTRIL) 20 MG tablet Take 1 tablet by mouth Daily. 30 tablet 5   • Testosterone Cypionate (DEPOTESTOTERONE CYPIONATE) 200 MG/ML injection INJECT 1 ML INTO THE APPROPRIATE MUSCLE Q 30 DAYS UTD BY PRESCRIBER       Administrations This Visit     Testosterone Cypionate (DEPOTESTOTERONE CYPIONATE) injection 200 mg     Admin  "Date  2020 Action  Given Dose  200 mg Route  Intramuscular Administered By  Shawnee Chavez MA                  Allergies   Allergen Reactions   • Guanfacine Other (See Comments)     sedation   • Sulfa Antibiotics GI Intolerance       Past Medical History:   Diagnosis Date   • Anxiety    • Arthritis    • Depression    • HTN (hypertension)    • Hypertriglyceridemia 2020   • Wears glasses         Past Surgical History:   Procedure Laterality Date   • VASECTOMY          Family History   Problem Relation Age of Onset   • Mental illness Mother         bipolar vs depression   • Arthritis Father    • Hyperlipidemia Paternal Grandfather    • Hypertension Paternal Grandfather    • Heart attack Paternal Grandfather    • Stroke Paternal Grandfather    • Colon polyps Neg Hx    • Colon cancer Neg Hx         Social History     Socioeconomic History   • Marital status: Single     Spouse name: Julienne Patel   • Number of children: Not on file   • Years of education: Not on file   • Highest education level: Not on file   Occupational History   • Occupation: [employer]    Tobacco Use   • Smoking status: Former Smoker     Packs/day: 0.25     Years: 12.00     Pack years: 3.00     Types: Cigars, Cigarettes     Quit date:      Years since quittin.9   • Smokeless tobacco: Never Used   • Tobacco comment: never been a regular smoker, very little tobacco use. reports maybe 2 cigarettes per month.   Substance and Sexual Activity   • Alcohol use: Yes     Alcohol/week: 3.0 - 4.0 standard drinks     Types: 3 - 4 Glasses of wine per week   • Drug use: Not Currently     Types: Marijuana        Vitals:    20 0807 20 0822   BP: 144/84 123/86   Pulse: 96    SpO2: 98%    Weight: 117 kg (258 lb)    Height: 181.6 cm (71.5\")    PainSc: 0-No pain       Body mass index is 35.49 kg/m².    Physical Exam    Results for orders placed or performed in visit on 20   Testosterone    Specimen: Blood   Result Value Ref Range    " Testosterone, Total 390.00 249.00 - 836.00 ng/dL        Diagnoses and all orders for this visit:    1. Low testosterone (Primary)  -     Testosterone Cypionate (DEPOTESTOTERONE CYPIONATE) 200 MG/ML injection; Inject 1 mL into the appropriate muscle as directed by prescriber Every 28 (Twenty-Eight) Days.  Dispense: 10 mL; Refill: 0  -     Testosterone Cypionate (DEPOTESTOTERONE CYPIONATE) injection 200 mg  Patient symptoms are improved with testosterone therapy.  Continue testosterone 200 every 28 days.  Recheck in 3 months.  The treatment plan includes a controlled substance.  Controlled Substance Medication Agreement signed and on file 7/21/2020. UDS 12/23/2020.  Risks, benefits, and alternative treatments reviewed.  ROSA report has been reviewed.   2. Essential hypertension  Improved on recheck.  Continue lisinopril.  3. Attention deficit hyperactivity disorder (ADHD), combined type  -     atomoxetine (Strattera) 40 MG capsule; Take 1 capsule by mouth Daily.  Dispense: 30 capsule; Refill: 1  Recently Strattera approved by insurance with multiple appeals.  He is starting to notice benefit.  Plan to continue 40 mg dose.  Follow-up next month if not improving and wants to try higher at 80 mg dose.  4. Therapeutic drug monitoring  -     Urine Drug Screen - Urine, Clean Catch; Future        Return in about 3 months (around 3/23/2021) for Office visit ADHD, testosterone.    Electronically signed by Magaly De Jesus MD, 12/23/20, 9:12 AM EST.

## 2020-12-31 DIAGNOSIS — Z51.81 THERAPEUTIC DRUG MONITORING: ICD-10-CM

## 2021-01-04 DIAGNOSIS — F90.2 ATTENTION DEFICIT HYPERACTIVITY DISORDER (ADHD), COMBINED TYPE: Primary | ICD-10-CM

## 2021-01-25 ENCOUNTER — CLINICAL SUPPORT (OUTPATIENT)
Dept: FAMILY MEDICINE CLINIC | Facility: CLINIC | Age: 36
End: 2021-01-25

## 2021-01-25 ENCOUNTER — LAB (OUTPATIENT)
Dept: LAB | Facility: HOSPITAL | Age: 36
End: 2021-01-25

## 2021-01-25 ENCOUNTER — TELEMEDICINE (OUTPATIENT)
Dept: PSYCHIATRY | Facility: CLINIC | Age: 36
End: 2021-01-25

## 2021-01-25 DIAGNOSIS — F33.0 MILD EPISODE OF RECURRENT MAJOR DEPRESSIVE DISORDER (HCC): Chronic | ICD-10-CM

## 2021-01-25 DIAGNOSIS — Z79.899 MEDICATION MANAGEMENT: ICD-10-CM

## 2021-01-25 DIAGNOSIS — F41.1 GENERALIZED ANXIETY DISORDER: Chronic | ICD-10-CM

## 2021-01-25 DIAGNOSIS — F90.0 ATTENTION DEFICIT HYPERACTIVITY DISORDER (ADHD), PREDOMINANTLY INATTENTIVE TYPE: Primary | Chronic | ICD-10-CM

## 2021-01-25 DIAGNOSIS — R79.89 LOW TESTOSTERONE: Primary | ICD-10-CM

## 2021-01-25 LAB
AMPHET+METHAMPHET UR QL: NEGATIVE
AMPHETAMINES UR QL: NEGATIVE
BARBITURATES UR QL SCN: NEGATIVE
BENZODIAZ UR QL SCN: NEGATIVE
BUPRENORPHINE SERPL-MCNC: NEGATIVE NG/ML
CANNABINOIDS SERPL QL: NEGATIVE
COCAINE UR QL: NEGATIVE
METHADONE UR QL SCN: NEGATIVE
OPIATES UR QL: NEGATIVE
OXYCODONE UR QL SCN: NEGATIVE
PCP UR QL SCN: NEGATIVE
PROPOXYPH UR QL: NEGATIVE
TRICYCLICS UR QL SCN: NEGATIVE

## 2021-01-25 PROCEDURE — 80306 DRUG TEST PRSMV INSTRMNT: CPT

## 2021-01-25 PROCEDURE — 96372 THER/PROPH/DIAG INJ SC/IM: CPT | Performed by: FAMILY MEDICINE

## 2021-01-25 PROCEDURE — 90792 PSYCH DIAG EVAL W/MED SRVCS: CPT | Performed by: NURSE PRACTITIONER

## 2021-01-25 RX ORDER — BUPROPION HYDROCHLORIDE 300 MG/1
300 TABLET ORAL DAILY
Qty: 30 TABLET | Refills: 0 | Status: SHIPPED | OUTPATIENT
Start: 2021-01-25 | End: 2021-02-22 | Stop reason: SDUPTHER

## 2021-01-25 RX ADMIN — TESTOSTERONE CYPIONATE 200 MG: 200 INJECTION, SOLUTION INTRAMUSCULAR at 13:20
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
## 2021-01-26 DIAGNOSIS — F90.0 ATTENTION DEFICIT HYPERACTIVITY DISORDER (ADHD), PREDOMINANTLY INATTENTIVE TYPE: Primary | ICD-10-CM

## 2021-01-26 RX ORDER — DEXTROAMPHETAMINE SACCHARATE, AMPHETAMINE ASPARTATE MONOHYDRATE, DEXTROAMPHETAMINE SULFATE AND AMPHETAMINE SULFATE 1.25; 1.25; 1.25; 1.25 MG/1; MG/1; MG/1; MG/1
5 CAPSULE, EXTENDED RELEASE ORAL EVERY MORNING
Qty: 30 CAPSULE | Refills: 0 | Status: SHIPPED | OUTPATIENT
Start: 2021-01-26 | End: 2021-02-22 | Stop reason: SDUPTHER

## 2021-01-26 NOTE — PROGRESS NOTES
UDS completed 1/25/21 reviewed and appropriate to begin medication management per treatment plan discussed with patient on 1/25/21.

## 2021-01-28 ENCOUNTER — PRIOR AUTHORIZATION (OUTPATIENT)
Dept: PSYCHIATRY | Facility: CLINIC | Age: 36
End: 2021-01-28

## 2021-02-18 ENCOUNTER — TELEMEDICINE (OUTPATIENT)
Dept: PSYCHIATRY | Facility: CLINIC | Age: 36
End: 2021-02-18

## 2021-02-18 DIAGNOSIS — F90.0 ATTENTION DEFICIT HYPERACTIVITY DISORDER (ADHD), PREDOMINANTLY INATTENTIVE TYPE: Primary | ICD-10-CM

## 2021-02-18 DIAGNOSIS — F33.0 MILD EPISODE OF RECURRENT MAJOR DEPRESSIVE DISORDER (HCC): ICD-10-CM

## 2021-02-18 DIAGNOSIS — F41.1 GENERALIZED ANXIETY DISORDER: ICD-10-CM

## 2021-02-18 PROCEDURE — 90791 PSYCH DIAGNOSTIC EVALUATION: CPT | Performed by: COUNSELOR
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
## 2021-02-22 ENCOUNTER — TELEMEDICINE (OUTPATIENT)
Dept: PSYCHIATRY | Facility: CLINIC | Age: 36
End: 2021-02-22

## 2021-02-22 DIAGNOSIS — F41.1 GENERALIZED ANXIETY DISORDER: Chronic | ICD-10-CM

## 2021-02-22 DIAGNOSIS — F33.0 MILD EPISODE OF RECURRENT MAJOR DEPRESSIVE DISORDER (HCC): Chronic | ICD-10-CM

## 2021-02-22 DIAGNOSIS — F90.0 ATTENTION DEFICIT HYPERACTIVITY DISORDER (ADHD), PREDOMINANTLY INATTENTIVE TYPE: Primary | Chronic | ICD-10-CM

## 2021-02-22 PROCEDURE — 99214 OFFICE O/P EST MOD 30 MIN: CPT | Performed by: NURSE PRACTITIONER

## 2021-02-22 RX ORDER — BUPROPION HYDROCHLORIDE 300 MG/1
300 TABLET ORAL DAILY
Qty: 30 TABLET | Refills: 0 | Status: SHIPPED | OUTPATIENT
Start: 2021-02-22 | End: 2021-03-22 | Stop reason: SDUPTHER

## 2021-02-22 RX ORDER — DEXTROAMPHETAMINE SACCHARATE, AMPHETAMINE ASPARTATE MONOHYDRATE, DEXTROAMPHETAMINE SULFATE AND AMPHETAMINE SULFATE 2.5; 2.5; 2.5; 2.5 MG/1; MG/1; MG/1; MG/1
10 CAPSULE, EXTENDED RELEASE ORAL EVERY MORNING
Qty: 30 CAPSULE | Refills: 0 | Status: SHIPPED | OUTPATIENT
Start: 2021-02-22 | End: 2021-03-22 | Stop reason: SDUPTHER

## 2021-02-22 NOTE — PROGRESS NOTES
"This provider is located at the [facility] (through [facility]), [address], [place], [address] using a secure [email or web address] Video Visit through [employer]. Patient is being seen remotely via telehealth at their home address in [place], and stated they are in a secure environment for this session. The patient's condition being diagnosed/treated is appropriate for telemedicine. The provider identified herself as well as her credentials. The patient, and/or patients guardian, consent to be seen remotely, and when consent is given they understand that the consent allows for patient identifiable information to be sent to a third party as needed. They may refuse to be seen remotely at any time. The electronic data is encrypted and password protected, and the patient and/or guardian has been advised of the potential risks to privacy not withstanding such measures.    You have chosen to receive care through a telehealth visit.  Do you consent to use a video/audio connection for your medical care today? Yes     Subjective   Frank Champion is a 35 y.o. male who is here today for medication management follow up.    Chief Complaint: Depression, anxiety, ADHD    History of Present Illness: The patient describes his mood as \"pretty good\" over the last few weeks.  The patient reports his appetite as good. Patient states that he has started trying to eat healthier for Lent and focusing on his diet more.  The patient reports his sleep as good.  The patient denies any nightmares or bad dreams.  The patient denies any new medical problems or changes in medications since last appointment with this facility.  The patient reports compliance with current medication regimen.  The patient denies any current side effects from his current medication regimen.  The patient denies any abnormal muscle movements or tics.  The patient rates his depression at a 2-3/10 on a 0-10 scale, with 10 being the worst.  " The patient rates his anxiety at a 2-3/10 on a 0-10 scale, with 10 being the worst.  Patient states that overall he feels as if anxiety and depression have improved, but states that he's still struggling with symptoms related to ADHD such as difficulty with focus and inattention.  The patient rates his ADHD 7-8/10 on a 0-10 scale, with 10 being the worst.  Patient states that he's noticed some small changes in his focus and concentration, but states that he's still struggling with these symptoms.  Patient states that he started a new job at the [employer]. States that he's been able to get his work done and complete tasks, but states that he's having to put forth a lot of effort to do so. States that he has been becoming very distracted and rush to complete tasks before they are due.  The patient rates hopelessness at a 0/10 on a 0-10 scale with 10 being the worst.  The patient would like to increase his Adderall at this visit.  Patient states that he started psychotherapy and these sessions have been going well so far. The patient denies suicidal ideations and homicidal ideations, and is convincing.  The patient denies any auditory hallucinations or visual hallucinations.  The patient does not endorse significant symptoms consistent with bipolar disorder or a psychotic illness.      The following portions of the patient's history were reviewed and updated as appropriate: allergies, current medications, past family history, past medical history, past social history, past surgical history and problem list.    Review of Systems   Constitutional: Negative for activity change, appetite change, fatigue and unexpected weight change.   Psychiatric/Behavioral: Positive for decreased concentration. Negative for agitation, behavioral problems, confusion, dysphoric mood, hallucinations, self-injury, sleep disturbance and suicidal ideas. The patient is not nervous/anxious and is not hyperactive.               Objective   Physical Exam  Constitutional:       Appearance: Normal appearance.   Neurological:      Mental Status: He is alert.   Psychiatric:         Attention and Perception: Perception normal. He is inattentive.         Mood and Affect: Mood and affect normal.         Speech: Speech normal.         Behavior: Behavior normal. Behavior is cooperative.         Thought Content: Thought content normal. Thought content does not include homicidal or suicidal ideation. Thought content does not include homicidal or suicidal plan.         Cognition and Memory: Cognition and memory normal.         Judgment: Judgment normal.       There were no vitals taken for this visit.    The patient was seen remotely today via a [employer] Video Visit through [facility].  Unable to obtain vital signs due to nature of remote visit.  Height stated at 71.5 inches.  Weight stated at 258 pounds.     Allergies   Allergen Reactions   • Guanfacine Other (See Comments)     sedation   • Strattera [Atomoxetine] Other (See Comments)     Erectile dysfunction, difficulty urinating, urinary incontinence   • Sulfa Antibiotics GI Intolerance       Recent Results (from the past 2016 hour(s))   Urine Drug Screen - Urine, Clean Catch    Collection Time: 01/25/21  1:31 PM    Specimen: Urine, Clean Catch   Result Value Ref Range    THC, Screen, Urine Negative Negative    Phencyclidine (PCP), Urine Negative Negative    Cocaine Screen, Urine Negative Negative    Methamphetamine, Ur Negative Negative    Opiate Screen Negative Negative    Amphetamine Screen, Urine Negative Negative    Benzodiazepine Screen, Urine Negative Negative    Tricyclic Antidepressants Screen Negative Negative    Methadone Screen, Urine Negative Negative    Barbiturates Screen, Urine Negative Negative    Oxycodone Screen, Urine Negative Negative    Propoxyphene Screen Negative Negative    Buprenorphine, Screen, Urine Negative Negative       Current Medications:   Current Outpatient  Medications   Medication Sig Dispense Refill   • amphetamine-dextroamphetamine XR (ADDERALL XR) 10 MG 24 hr capsule Take 1 capsule by mouth Every Morning 30 capsule 0   • buPROPion XL (Wellbutrin XL) 300 MG 24 hr tablet Take 1 tablet by mouth Daily. 30 tablet 0   • lansoprazole (PREVACID) 15 MG capsule Take 15 mg by mouth Daily.     • lisinopril (PRINIVIL,ZESTRIL) 20 MG tablet Take 1 tablet by mouth Daily. 30 tablet 5   • Testosterone Cypionate (DEPOTESTOTERONE CYPIONATE) 200 MG/ML injection Inject 1 mL into the appropriate muscle as directed by prescriber Every 28 (Twenty-Eight) Days. 10 mL 0     Current Facility-Administered Medications   Medication Dose Route Frequency Provider Last Rate Last Admin   • Testosterone Cypionate (DEPOTESTOTERONE CYPIONATE) injection 200 mg  200 mg Intramuscular Q28 Days Magaly Kay MD   200 mg at 01/25/21 1320       Mental Status Exam:   Hygiene:   good  Cooperation:  Cooperative  Eye Contact:  Good  Psychomotor Behavior:  Appropriate  Affect:  Full range  Hopelessness: Denies  Speech:  Normal  Thought Process:  Goal directed  Thought Content:  Normal  Suicidal:  None  Homicidal:  None  Hallucinations:  None  Delusion:  None  Memory:  Intact  Orientation:  Person, Place, Time and Situation  Reliability:  good  Insight:  Good  Judgement:  Good  Impulse Control:  Good  Physical/Medical Issues:  Yes See medical history    PHQ-9 Depression Screening  Little interest or pleasure in doing things? 1   Feeling down, depressed, or hopeless? 1   Trouble falling or staying asleep, or sleeping too much? 1   Feeling tired or having little energy? 1   Poor appetite or overeating? 3   Feeling bad about yourself - or that you are a failure or have let yourself or your family down? 2   Trouble concentrating on things, such as reading the newspaper or watching television? 3   Moving or speaking so slowly that other people could have noticed? Or the opposite - being so fidgety or  restless that you have been moving around a lot more than usual? 3   Thoughts that you would be better off dead, or of hurting yourself in some way? 0   PHQ-9 Total Score 15   If you checked off any problems, how difficult have these problems made it for you to do your work, take care of things at home, or get along with other people? Very difficult        PHQ-Score Total:  PHQ-9 Total Score: 15    JERI-7  Feeling nervous, anxious or on edge: Several days  Not being able to stop or control worrying: More than half the days  Worrying too much about different things: More than half the days  Trouble Relaxing: Several days  Being so restless that it is hard to sit still: Nearly every day  Feeling afraid as if something awful might happen: Several days  Becoming easily annoyed or irritable: Nearly every day  JERI 7 Total Score: 13  If you checked any problems, how difficult have these problems made it for you to do your work, take care of things at home, or get along with other people: Very difficult       PROMIS scale screening tool that patient filled out virtually reviewed by this APRN at today's encounter.         Assessment/Plan   Diagnoses and all orders for this visit:    1. Attention deficit hyperactivity disorder (ADHD), predominantly inattentive type (Primary)  -     amphetamine-dextroamphetamine XR (ADDERALL XR) 10 MG 24 hr capsule; Take 1 capsule by mouth Every Morning  Dispense: 30 capsule; Refill: 0    2. Mild episode of recurrent major depressive disorder (CMS/HCC)  -     buPROPion XL (Wellbutrin XL) 300 MG 24 hr tablet; Take 1 tablet by mouth Daily.  Dispense: 30 tablet; Refill: 0    3. Generalized anxiety disorder  -     buPROPion XL (Wellbutrin XL) 300 MG 24 hr tablet; Take 1 tablet by mouth Daily.  Dispense: 30 tablet; Refill: 0            Visit Diagnoses:    ICD-10-CM ICD-9-CM   1. Attention deficit hyperactivity disorder (ADHD), predominantly inattentive type  F90.0 314.00   2. Mild episode of  recurrent major depressive disorder (CMS/[facility])  F33.0 296.31   3. Generalized anxiety disorder  F41.1 300.02       GOALS:  Short Term Goals: Patient will be compliant with medication, and patient will have no significant medication related side effects.  Patient will be engaged in psychotherapy as indicated.  Patient will report subjective improvement of symptoms.  Long term goals: To stabilize mood and treat/improve subjective symptoms, the patient will stay out of the hospital, the patient will be at an optimal level of functioning, and the patient will take all medications as prescribed.  The patient verbalized understanding and agreement with goals that were mutually set.      TREATMENT PLAN/GOALS: Continue medications and treatment plan as indicated. Treatment and medication options discussed during today's visit. Patient acknowledged and verbally consented to continue with current treatment plan and was educated on the importance of compliance with treatment and follow-up appointments.    -Increase Adderall XR to 10 mg daily  -Continue Wellbutrin  mg daily  -Continue psychotherapy        Patient screened positive for depression based on a PHQ-9 score of 15 on 2/22/2021. Follow-up recommendations include: Prescribed antidepressant medication treatment.        MEDICATION ISSUES: Discussed medication options and treatment plan of prescribed medication, any off label use of medication, as well as the risks, benefits, any black box warnings including increased suicidality, and side effects including but not limited to potential falls, dizziness, possible impaired driving, GI side effects (change in appetite, abdominal discomfort, nausea, vomiting, diarrhea, and/or constipation), dry mouth, somnolence, sedation, insomnia, activation, agitation, irritation, tremors, abnormal muscle movements or disorders, headache, sweating, possible bruising or rare bleeding, electrolyte and/or fluid abnormalities, change in  blood pressure/heart rate/and or heart rhythm, sexual dysfunction, and metabolic adversities among others. Patient and/or guardian agreeable to call the office with any worsening of symptoms or onset of side effects, or if any concerns or questions arise.  The contact information for the office is made available to the patient and/or guardian.  Patient and/or guardian agreeable to call 911 or go to the nearest ER should they begin having any SI/HI, or if any urgent concerns arise. No medication side effects or related complaints today.    The patient is being prescribed a controlled substance as part of the treatment plan. The patient/guardian has been educated of appropriate use of the medication(s), including risks and side effects such as insomnia, headache, exacerbation of tics, nervousness, irritability, overstimulation, tremor, dizziness, anorexia or change in appetite, nausea, dry mouth, constipation, diarrhea, weight loss, sexual dysfunction, psychotic episodes, seizures, palpitations, tachycardia, hypertension, rare activation (activation of hypomania, chelsie, and/or suicidal ideations), cardiovascular adverse effects including sudden death (especially patients with pre-existing structural abnormalities often associated with a family history of cardiac disease), may slow normal growth in children, weight gain is reported but not expected, sedation is possible but activation is much more common, metabolic adversities, and overdose among others. Patient/guardian is also informed that the medication is to be used by the patient only, the medication is to be used only as directed, and the medication should not be combined with other substances unless directed by a Provider/Prescriber. The patient/guardian verbalized understanding and agreement with this in their own words, and wish to continue with current treatment plan.    Without the prescribed medication for ADHD, it is reported that the patient has problems  with attention and focus including being easily distracted, easily losing objects, trouble with time management, trouble completing tasks because of distractions, procrastination, indecisiveness, careless mistakes in daily activities/work/school, and not finishing jobs that are started.  Patient denies any side effects, no worsening of insomnia, and no worsening of anxiety on the medication dose.  Due to the reported problems without the medication usage, as well as the significant improvement in symptoms with the medication usage, the patient requests to remain on the prescribed medication for ADHD.      MEDS ORDERED DURING VISIT:  New Medications Ordered This Visit   Medications   • buPROPion XL (Wellbutrin XL) 300 MG 24 hr tablet     Sig: Take 1 tablet by mouth Daily.     Dispense:  30 tablet     Refill:  0   • amphetamine-dextroamphetamine XR (ADDERALL XR) 10 MG 24 hr capsule     Sig: Take 1 capsule by mouth Every Morning     Dispense:  30 capsule     Refill:  0       Return in about 4 weeks (around 3/22/2021), or if symptoms worsen or fail to improve, for Recheck.       Patient will follow-up in 4 weeks, highly encouraged the patient if he had any questions or concerns to contact the behavioral health [facility] clinic for sooner appointment patient verbalized understanding.      Functional Status: Mild impairment     Prognosis: Guarded with Ongoing Treatment            This document has been electronically signed by BETTY Sandy  February 22, 2021 09:50 EST    Part of this note may be an electronic transcription/translation of spoken language to printed text using the Dragon Dictation System.

## 2021-02-22 NOTE — TREATMENT PLAN
Multi-Disciplinary Problems (from Behavioral Health Treatment Plan)    Active Problems     Problem: Anxiety  Start Date: 02/22/21    Problem Details: The patient self-scales this problem as a 3 with 10 being the worst.        Goal Priority Start Date Expected End Date End Date    Patient will develop and implement behavioral and cognitive strategies to reduce anxiety and irrational fears. -- 02/22/21 -- --    Goal Details: Progress toward goal:  Not appropriate to rate progress toward goal since this is the initial treatment plan.        Goal Intervention Frequency Start Date End Date    Help patient explore past emotional issues in relation to present anxiety. Q Month 02/22/21 --    Intervention Details: Duration of treatment until until remission of symptoms.        Goal Intervention Frequency Start Date End Date    Help patient develop an awareness of their cognitive and physical responses to anxiety. Q Month 02/22/21 --    Intervention Details: Duration of treatment until until remission of symptoms.              Problem: Depression  Start Date: 02/22/21    Problem Details: The patient self-scales this problem as a 3 with 10 being the worst.        Goal Priority Start Date Expected End Date End Date    Patient will demonstrate the ability to initiate new constructive life skills outside of sessions on a consistent basis. -- 02/22/21 -- --    Goal Details: Progress toward goal:  Not appropriate to rate progress toward goal since this is the initial treatment plan.        Goal Intervention Frequency Start Date End Date    Assist patient in setting attainable activities of daily living goals. PRN 02/22/21 --    Goal Intervention Frequency Start Date End Date    Provide education about depression Q Month 02/22/21 --    Intervention Details: Duration of treatment until until remission of symptoms.        Goal Intervention Frequency Start Date End Date    Assist patient in developing healthy coping strategies. Q Month  02/22/21 --    Intervention Details: Duration of treatment until until remission of symptoms.              Problem: ADHD (Adult)  Start Date: 02/22/21    Problem Details: The patient self-scales this problem as a 8 with 10 being the worst.      Goal Priority Start Date Expected End Date End Date    Patient will sustain attention and concentration to complete chores, and work responsibilites and increase positive interaction in all relationships. -- 02/22/21 -- --    Goal Details: Progress toward goal:  Not appropriate to rate progress toward goal since this is the initial treatment plan.      Goal Intervention Frequency Start Date End Date    Assist patient in setting responsible goals and breaking down large tasks. Q Month 02/22/21 --    Intervention Details: Duration of treatment until until remission of symptoms.      Goal Intervention Frequency Start Date End Date    Assist patient in using self monitoring checklist to improve attention, work performance, and social skills. Q Month 02/22/21 --    Intervention Details: Duration of treatment until until remission of symptoms.                         I have discussed and reviewed this treatment plan with the patient and/or guardian.  The patient has verbally agreed with this treatment plan (no signatures are obtained at today's visit as the patient is a telehealth patient and is unable to print and sign this document, therefore verbal agreement is obtained).

## 2021-03-16 RX ORDER — LISINOPRIL 10 MG/1
TABLET ORAL
Qty: 30 TABLET | Refills: 0 | OUTPATIENT
Start: 2021-03-16

## 2021-03-16 NOTE — TELEPHONE ENCOUNTER
Contacted pharmacy and advised them that the provider has prescribed 20mg for the lisinopril. Pharmacist states they had an old order from [facility] and will discontinue that one.

## 2021-03-22 ENCOUNTER — TELEMEDICINE (OUTPATIENT)
Dept: PSYCHIATRY | Facility: CLINIC | Age: 36
End: 2021-03-22

## 2021-03-22 DIAGNOSIS — F41.1 GENERALIZED ANXIETY DISORDER: Chronic | ICD-10-CM

## 2021-03-22 DIAGNOSIS — F33.0 MILD EPISODE OF RECURRENT MAJOR DEPRESSIVE DISORDER (HCC): Chronic | ICD-10-CM

## 2021-03-22 DIAGNOSIS — F90.0 ATTENTION DEFICIT HYPERACTIVITY DISORDER (ADHD), PREDOMINANTLY INATTENTIVE TYPE: Primary | Chronic | ICD-10-CM

## 2021-03-22 PROCEDURE — 99214 OFFICE O/P EST MOD 30 MIN: CPT | Performed by: NURSE PRACTITIONER

## 2021-03-22 RX ORDER — BUPROPION HYDROCHLORIDE 300 MG/1
300 TABLET ORAL DAILY
Qty: 30 TABLET | Refills: 0 | Status: SHIPPED | OUTPATIENT
Start: 2021-03-22 | End: 2021-04-19 | Stop reason: SDUPTHER

## 2021-03-22 RX ORDER — DEXTROAMPHETAMINE SACCHARATE, AMPHETAMINE ASPARTATE MONOHYDRATE, DEXTROAMPHETAMINE SULFATE AND AMPHETAMINE SULFATE 3.75; 3.75; 3.75; 3.75 MG/1; MG/1; MG/1; MG/1
15 CAPSULE, EXTENDED RELEASE ORAL EVERY MORNING
Qty: 30 CAPSULE | Refills: 0 | Status: SHIPPED | OUTPATIENT
Start: 2021-03-22 | End: 2021-04-19 | Stop reason: SDUPTHER

## 2021-03-22 NOTE — PROGRESS NOTES
"This provider is located at the [facility] (through [facility]), [address], [facility], [address] using a secure Presta[employer]oph[employer] Video Visit through [employer]. Patient is being seen remotely via telehealth at their home address in [place], and stated they are in a secure environment for this session. The patient's condition being diagnosed/treated is appropriate for telemedicine. The provider identified herself as well as her credentials. The patient, and/or patients guardian, consent to be seen remotely, and when consent is given they understand that the consent allows for patient identifiable information to be sent to a third party as needed. They may refuse to be seen remotely at any time. The electronic data is encrypted and password protected, and the patient and/or guardian has been advised of the potential risks to privacy not withstanding such measures.    You have chosen to receive care through a telehealth visit.  Do you consent to use a video/audio connection for your medical care today? Yes     Subjective   Frank Champion is a 35 y.o. male who is here today for medication management follow up.    Chief Complaint: Depression, anxiety, ADHD    History of Present Illness: The patient describes his mood as \"good\" over the last few weeks. Patient states that he has noticed a change in his focus and concentration since increasing the Adderall XR, but states that he still struggles with focus/concentration especially in the afternoons. Patient states that he seems more focused and is able to complete work tasks easier in the mornings, but the afternoons seem to be more difficult.  The patient reports his appetite as good.  The patient reports his sleep as good.  The patient denies any nightmares or bad dreams.  The patient denies any new medical problems or changes in medications since last appointment with this facility.  The patient reports compliance with current " "medication regimen.  The patient denies any current side effects from his current medication regimen.  The patient denies any abnormal muscle movements or tics.  The patient rates his ADHD 6-7/10 on a 0-10 scale, with 10 being the worst.  The patient rates his depression at a 2-3/10 on a 0-10 scale, with 10 being the worst. Patient states that this depression feels well-controlled at this time since he has been back working. The patient rates his anxiety at a 3-4/10 on a 0-10 scale, with 10 being the worst.  Patient denies any panic attacks. States that his anxiety is \"normal work stress stuff\".  The patient rates hopelessness at a 0/10 on a 0-10 scale with 10 being the worst.  The patient denies suicidal ideations and homicidal ideations, and is convincing.  The patient denies any auditory hallucinations or visual hallucinations.  The patient does not endorse significant symptoms consistent with bipolar disorder or a psychotic illness.            The following portions of the patient's history were reviewed and updated as appropriate: allergies, current medications, past family history, past medical history, past social history, past surgical history and problem list.    Review of Systems   Constitutional: Negative for activity change, appetite change, fatigue and unexpected weight change.   Psychiatric/Behavioral: Positive for decreased concentration. Negative for agitation, behavioral problems, confusion, dysphoric mood, hallucinations, self-injury, sleep disturbance and suicidal ideas. The patient is not nervous/anxious and is not hyperactive.        Objective   Physical Exam  Constitutional:       Appearance: Normal appearance.   Neurological:      Mental Status: He is alert.   Psychiatric:         Attention and Perception: Perception normal. He is inattentive.         Mood and Affect: Mood and affect normal.         Speech: Speech normal.         Behavior: Behavior normal. Behavior is cooperative.         " Thought Content: Thought content normal. Thought content does not include homicidal or suicidal ideation. Thought content does not include homicidal or suicidal plan.         Cognition and Memory: Cognition and memory normal.         Judgment: Judgment normal.       There were no vitals taken for this visit.    The patient was seen remotely today via a MyChart Video Visit through [facility].  Unable to obtain vital signs due to nature of remote visit.  Height stated at 71.5 inches.  Weight stated at 258 pounds.     Allergies   Allergen Reactions   • Guanfacine Other (See Comments)     sedation   • Strattera [Atomoxetine] Other (See Comments)     Erectile dysfunction, difficulty urinating, urinary incontinence   • Sulfa Antibiotics GI Intolerance       Recent Results (from the past 2016 hour(s))   Urine Drug Screen - Urine, Clean Catch    Collection Time: 01/25/21  1:31 PM    Specimen: Urine, Clean Catch   Result Value Ref Range    THC, Screen, Urine Negative Negative    Phencyclidine (PCP), Urine Negative Negative    Cocaine Screen, Urine Negative Negative    Methamphetamine, Ur Negative Negative    Opiate Screen Negative Negative    Amphetamine Screen, Urine Negative Negative    Benzodiazepine Screen, Urine Negative Negative    Tricyclic Antidepressants Screen Negative Negative    Methadone Screen, Urine Negative Negative    Barbiturates Screen, Urine Negative Negative    Oxycodone Screen, Urine Negative Negative    Propoxyphene Screen Negative Negative    Buprenorphine, Screen, Urine Negative Negative       Current Medications:   Current Outpatient Medications   Medication Sig Dispense Refill   • amphetamine-dextroamphetamine XR (ADDERALL XR) 15 MG 24 hr capsule Take 1 capsule by mouth Every Morning 30 capsule 0   • buPROPion XL (Wellbutrin XL) 300 MG 24 hr tablet Take 1 tablet by mouth Daily. 30 tablet 0   • lansoprazole (PREVACID) 15 MG capsule Take 15 mg by mouth Daily.     • lisinopril (PRINIVIL,ZESTRIL) 20 MG  tablet Take 1 tablet by mouth Daily. 30 tablet 5   • Testosterone Cypionate (DEPOTESTOTERONE CYPIONATE) 200 MG/ML injection Inject 1 mL into the appropriate muscle as directed by prescriber Every 28 (Twenty-Eight) Days. 10 mL 0     Current Facility-Administered Medications   Medication Dose Route Frequency Provider Last Rate Last Admin   • Testosterone Cypionate (DEPOTESTOTERONE CYPIONATE) injection 200 mg  200 mg Intramuscular Q28 Days Magaly Kay MD   200 mg at 01/25/21 1320       Mental Status Exam:   Hygiene:   good  Cooperation:  Cooperative  Eye Contact:  Good  Psychomotor Behavior:  Appropriate  Affect:  Full range  Hopelessness: Denies  Speech:  Normal  Thought Process:  Goal directed  Thought Content:  Normal  Suicidal:  None  Homicidal:  None  Hallucinations:  None  Delusion:  None  Memory:  Intact  Orientation:  Person, Place, Time and Situation  Reliability:  good  Insight:  Good  Judgement:  Good  Impulse Control:  Good  Physical/Medical Issues:  Yes See medical history    PHQ-9 Depression Screening  Little interest or pleasure in doing things? (P) 0   Feeling down, depressed, or hopeless? (P) 0   Trouble falling or staying asleep, or sleeping too much? (P) 0   Feeling tired or having little energy? (P) 1   Poor appetite or overeating? (P) 1   Feeling bad about yourself - or that you are a failure or have let yourself or your family down? (P) 1   Trouble concentrating on things, such as reading the newspaper or watching television? (P) 2   Moving or speaking so slowly that other people could have noticed? Or the opposite - being so fidgety or restless that you have been moving around a lot more than usual? (P) 2   Thoughts that you would be better off dead, or of hurting yourself in some way? (P) 0   PHQ-9 Total Score (P) 7   If you checked off any problems, how difficult have these problems made it for you to do your work, take care of things at home, or get along with other people? (P)  Very difficult        PHQ-Score Total:  PHQ-9 Total Score: (P) 7    JERI-7  Feeling nervous, anxious or on edge: Not at all  Not being able to stop or control worrying: Not at all  Worrying too much about different things: Not at all  Trouble Relaxing: Several days  Being so restless that it is hard to sit still: Several days  Feeling afraid as if something awful might happen: Not at all  Becoming easily annoyed or irritable: More than half the days  JERI 7 Total Score: 4  If you checked any problems, how difficult have these problems made it for you to do your work, take care of things at home, or get along with other people: Very difficult       PROMIS scale screening tool that patient filled out virtually reviewed by this APRN at today's encounter.         Assessment/Plan   Diagnoses and all orders for this visit:    1. Attention deficit hyperactivity disorder (ADHD), predominantly inattentive type (Primary)  -     amphetamine-dextroamphetamine XR (ADDERALL XR) 15 MG 24 hr capsule; Take 1 capsule by mouth Every Morning  Dispense: 30 capsule; Refill: 0    2. Mild episode of recurrent major depressive disorder (CMS/HCC)  -     buPROPion XL (Wellbutrin XL) 300 MG 24 hr tablet; Take 1 tablet by mouth Daily.  Dispense: 30 tablet; Refill: 0    3. Generalized anxiety disorder  -     buPROPion XL (Wellbutrin XL) 300 MG 24 hr tablet; Take 1 tablet by mouth Daily.  Dispense: 30 tablet; Refill: 0            Visit Diagnoses:    ICD-10-CM ICD-9-CM   1. Attention deficit hyperactivity disorder (ADHD), predominantly inattentive type  F90.0 314.00   2. Mild episode of recurrent major depressive disorder (CMS/HCC)  F33.0 296.31   3. Generalized anxiety disorder  F41.1 300.02       GOALS:  Short Term Goals: Patient will be compliant with medication, and patient will have no significant medication related side effects.  Patient will be engaged in psychotherapy as indicated.  Patient will report subjective improvement of  symptoms.  Long term goals: To stabilize mood and treat/improve subjective symptoms, the patient will stay out of the hospital, the patient will be at an optimal level of functioning, and the patient will take all medications as prescribed.  The patient verbalized understanding and agreement with goals that were mutually set.      TREATMENT PLAN/GOALS: Continue medications and treatment plan as indicated. Treatment and medication options discussed during today's visit. Patient acknowledged and verbally consented to continue with current treatment plan and was educated on the importance of compliance with treatment and follow-up appointments.    -Increase Adderall XR to 15 mg daily  -Continue Wellbutrin  mg daily  -Continue psychotherapy      MEDICATION ISSUES: Discussed medication options and treatment plan of prescribed medication, any off label use of medication, as well as the risks, benefits, any black box warnings including increased suicidality, and side effects including but not limited to potential falls, dizziness, possible impaired driving, GI side effects (change in appetite, abdominal discomfort, nausea, vomiting, diarrhea, and/or constipation), dry mouth, somnolence, sedation, insomnia, activation, agitation, irritation, tremors, abnormal muscle movements or disorders, headache, sweating, possible bruising or rare bleeding, electrolyte and/or fluid abnormalities, change in blood pressure/heart rate/and or heart rhythm, sexual dysfunction, and metabolic adversities among others. Patient and/or guardian agreeable to call the office with any worsening of symptoms or onset of side effects, or if any concerns or questions arise.  The contact information for the office is made available to the patient and/or guardian.  Patient and/or guardian agreeable to call 911 or go to the nearest ER should they begin having any SI/HI, or if any urgent concerns arise. No medication side effects or related complaints  today.    The patient is being prescribed a controlled substance as part of the treatment plan. The patient/guardian has been educated of appropriate use of the medication(s), including risks and side effects such as insomnia, headache, exacerbation of tics, nervousness, irritability, overstimulation, tremor, dizziness, anorexia or change in appetite, nausea, dry mouth, constipation, diarrhea, weight loss, sexual dysfunction, psychotic episodes, seizures, palpitations, tachycardia, hypertension, rare activation (activation of hypomania, chelsie, and/or suicidal ideations), cardiovascular adverse effects including sudden death (especially patients with pre-existing structural abnormalities often associated with a family history of cardiac disease), may slow normal growth in children, weight gain is reported but not expected, sedation is possible but activation is much more common, metabolic adversities, and overdose among others. Patient/guardian is also informed that the medication is to be used by the patient only, the medication is to be used only as directed, and the medication should not be combined with other substances unless directed by a Provider/Prescriber. The patient/guardian verbalized understanding and agreement with this in their own words, and wish to continue with current treatment plan.    Without the prescribed medication for ADHD, it is reported that the patient has problems with attention and focus including being easily distracted, easily losing objects, trouble with time management, trouble completing tasks because of distractions, procrastination, indecisiveness, careless mistakes in daily activities/work/school, and not finishing jobs that are started.  Patient denies any side effects, no worsening of insomnia, and no worsening of anxiety on the medication dose.  Due to the reported problems without the medication usage, as well as the significant improvement in symptoms with the medication  usage, the patient requests to remain on the prescribed medication for ADHD.      MEDS ORDERED DURING VISIT:  New Medications Ordered This Visit   Medications   • amphetamine-dextroamphetamine XR (ADDERALL XR) 15 MG 24 hr capsule     Sig: Take 1 capsule by mouth Every Morning     Dispense:  30 capsule     Refill:  0   • buPROPion XL (Wellbutrin XL) 300 MG 24 hr tablet     Sig: Take 1 tablet by mouth Daily.     Dispense:  30 tablet     Refill:  0       Return in about 4 weeks (around 4/19/2021), or if symptoms worsen or fail to improve, for Recheck.       Patient will follow-up in 4 weeks, highly encouraged the patient if he had any questions or concerns to contact the behavioral health [facility] clinic for sooner appointment patient verbalized understanding.      Functional Status: Mild impairment     Prognosis: Fair with Ongoing Treatment             This document has been electronically signed by BETTY Sandy  March 22, 2021 09:41 EDT    Part of this note may be an electronic transcription/translation of spoken language to printed text using the Dragon Dictation System.

## 2021-03-24 ENCOUNTER — TELEMEDICINE (OUTPATIENT)
Dept: PSYCHIATRY | Facility: CLINIC | Age: 36
End: 2021-03-24

## 2021-03-24 DIAGNOSIS — F90.0 ATTENTION DEFICIT HYPERACTIVITY DISORDER (ADHD), PREDOMINANTLY INATTENTIVE TYPE: Primary | ICD-10-CM

## 2021-03-24 PROCEDURE — 90832 PSYTX W PT 30 MINUTES: CPT | Performed by: COUNSELOR

## 2021-03-24 NOTE — TREATMENT PLAN
Multi-Disciplinary Problems (from Behavioral Health Treatment Plan)    Active Problems     Problem: Co-Dependency  Start Date: 03/24/21    Problem Details: The patient self-scales this problem as a 9 with 10 being the worst.        Goal Priority Start Date Expected End Date End Date    Patient will demonstrate ability to set healthy boundaries and meet own needs within a relationship. -- 03/24/21 -- --    Goal Details: Progress toward goal:  Not appropriate to rate progress toward goal since this is the initial treatment plan.        Goal Intervention Frequency Start Date End Date    Assist patient in identifying enabling behaviors and healthy boundaries within relationships. Q2 Weeks 03/24/21 --    Intervention Details: Duration of treatment until until remission of symptoms.              Problem: Ineffective Communication  Start Date: 03/24/21    Problem Details: The patient self-scales this problem as a 6 with 10 being the worst.        Goal Priority Start Date Expected End Date End Date    Patient will demonstrate the ability to initiate new communication patterns outside of sessions on a consistent basis. -- 03/24/21 -- --    Goal Details: Progress toward goal:  Not appropriate to rate progress toward goal since this is the initial treatment plan.        Goal Intervention Frequency Start Date End Date    Encourage understanding of past experiences that affect current communication style and educate about healthy communication patterns. Q2 Weeks 03/24/21 --    Intervention Details: Duration of treatment until until remission of symptoms.              Problem: ADHD (Adult)  Start Date: 03/24/21    Problem Details: The patient self-scales this problem as a 7 with 10 being the worst.      Goal Priority Start Date Expected End Date End Date    Patient will sustain attention and concentration to complete chores, and work responsibilites and increase positive interaction in all relationships. -- 03/24/21 -- --    Goal  Details: Progress toward goal:  Not appropriate to rate progress toward goal since this is the initial treatment plan.      Goal Intervention Frequency Start Date End Date    Assist patient in setting responsible goals and breaking down large tasks. Q2 Weeks 03/24/21 --    Intervention Details: Duration of treatment until until remission of symptoms.      Goal Intervention Frequency Start Date End Date    Assist patient in using self monitoring checklist to improve attention, work performance, and social skills. Q2 Weeks 03/24/21 --    Intervention Details: Duration of treatment until until remission of symptoms.                  Reviewed By     Julienne Cobb LCSW 03/24/21 1522                 I have discussed and reviewed this treatment plan with the patient.

## 2021-03-24 NOTE — PLAN OF CARE
Problem: Co-Dependency  Goal: Patient will demonstrate ability to set healthy boundaries and meet own needs within a relationship.  Outcome: Ongoing, Progressing     Problem: Ineffective Communication  Goal: Patient will demonstrate the ability to initiate new communication patterns outside of sessions on a consistent basis.  Outcome: Ongoing, Progressing     Problem: ADHD (Adult)  Goal: Patient will sustain attention and concentration to complete chores, and work responsibilites and increase positive interaction in all relationships.  Outcome: Ongoing, Progressing

## 2021-03-24 NOTE — PROGRESS NOTES
"Date: March 24, 2021  Time In: 1509  Time Out: 1529  This provider is located at the [facility]), [address], [place] [address] using a secure [employer] Video Visit through [email or web address]. Patient is being seen remotely via telehealth at home address in [place] and stated they are in a secure environment for this session. The patient's condition being diagnosed/treated is appropriate for telemedicine. The provider identified herself as well as her credentials. The patient, and/or patients guardian, consent to be seen remotely, and when consent is given they understand that the consent allows for patient identifiable information to be sent to a third party as needed. They may refuse to be seen remotely at any time. The electronic data is encrypted and password protected, and the patient and/or guardian has been advised of the potential risks to privacy not withstanding such measures.     You have chosen to receive care through a telehealth visit.  Do you consent to use a video/audio connection for your medical care today? Yes    PROGRESS NOTE  Data:  Frank Champion is a 35 y.o. male who presents today for follow up    Chief Complaint: ADHD     History of Present Illness: Patient presented self to complete follow-up from initial assessment last month.  Patient apologized for previous no-show explaining that he forgot about his appointment due to current work schedule.  Patient provided insight regarding medications for ADHD stating that his Adderall tends to be wearing off by lunch and is currently working on this with BETTY.  Today patient discussed initial treatment goals such as; how to cope with ADHD, help with of \"follow-through \", improve relationships by not lashing out when intention is broken during hyperfocus due to poor in pulse control and improving communication, and codependency explaining that he has difficulty setting personal boundaries and " saying no.  Patient reports that he has made multiple efforts addressing these goals independently however due to poor follow-through has been ineffective.  Patient reports that he is able to complete work-related task at work due to the ability to remain hyperfocus however at home struggles explaining difficulty completing daily tasks and projects.      Clinical Maneuvering/Intervention:    (Scales based on 0 - 10 with 10 being the worst)  Depression: 2-3 Anxiety: 3-4       Assisted patient in processing above session content; acknowledged and normalized patient’s thoughts, feelings, and concerns.  Rationalized patient thought process regarding treatment goals.  Discussed triggers associated with patient's ADHD.  Also discussed coping skills for patient to implement such as completing a list of projects or task that he would like to complete at home.    Allowed patient to freely discuss issues without interruption or judgment. Provided safe, confidential environment to facilitate the development of positive therapeutic relationship and encourage open, honest communication. Assisted patient in identifying risk factors which would indicate the need for higher level of care including thoughts to harm self or others and/or self-harming behavior and encouraged patient to contact this office, call 911, or present to the nearest emergency room should any of these events occur. Discussed crisis intervention services and means to access. Patient adamantly and convincingly denies current suicidal or homicidal ideation or perceptual disturbance.    Assessment:   Assessment   Patient appears to maintain relative stability as compared to their baseline.  However, patient continues to struggle with ADHD which continues to cause impairment in important areas of functioning.  A result, they can be reasonably expected to continue to benefit from treatment and would likely be at increased risk for decompensation otherwise.    Mental  Status Exam:   Hygiene:   good  Cooperation:  Cooperative  Eye Contact:  Good  Psychomotor Behavior:  Appropriate  Affect:  Appropriate  Mood: normal  Speech:  Normal  Thought Process:  Linear  Thought Content:  Normal and Mood congruent  Suicidal:  None  Homicidal:  None  Hallucinations:  None  Delusion:  None  Memory:  Intact  Orientation:  Person, Place, Time and Situation  Reliability:  good  Insight:  Fair  Judgement:  Fair  Impulse Control:  Poor  Physical/Medical Issues:  No        Patient's Support Network Includes:  wife    Functional Status: Mild impairment     Progress toward goal: Not at goal    Prognosis: Fair with Ongoing Treatment          Plan:    Patient will continue in individual outpatient therapy with focus on improved functioning and coping skills, maintaining stability, and avoiding decompensation and the need for higher level of care.    Patient will adhere to medication regimen as prescribed and report any side effects. Patient will contact this office, call 911 or present to the nearest emergency room should suicidal or homicidal ideations occur. Provide Cognitive Behavioral Therapy and Solution Focused Therapy to improve functioning, maintain stability, and avoid decompensation and the need for higher level of care.     Return in about 2 weeks, or earlier if symptoms worsen or fail to improve.           VISIT DIAGNOSIS:     ICD-10-CM ICD-9-CM   1. Attention deficit hyperactivity disorder (ADHD), predominantly inattentive type  F90.0 314.00             This document has been electronically signed by Julienne Cobb LCSW  March 24, 2021 16:05 EDT      Part of this note may be an electronic transcription/translation of spoken language to printed text using the Dragon Dictation System.

## 2021-04-05 ENCOUNTER — TELEMEDICINE (OUTPATIENT)
Dept: PSYCHIATRY | Facility: CLINIC | Age: 36
End: 2021-04-05

## 2021-04-05 DIAGNOSIS — F41.1 GENERALIZED ANXIETY DISORDER: ICD-10-CM

## 2021-04-05 DIAGNOSIS — F90.0 ATTENTION DEFICIT HYPERACTIVITY DISORDER (ADHD), PREDOMINANTLY INATTENTIVE TYPE: Primary | ICD-10-CM

## 2021-04-05 PROCEDURE — 90832 PSYTX W PT 30 MINUTES: CPT | Performed by: COUNSELOR

## 2021-04-05 NOTE — PROGRESS NOTES
Date: April 5, 2021  Time In: 1426  Time Out: 1450  This provider is located at the [facility] (through [facility]), [address], [place] [address] using a secure [employer] Video Visit through [employer]. Patient is being seen remotely via telehealth at home address in [place] and stated they are in a secure environment for this session. The patient's condition being diagnosed/treated is appropriate for telemedicine. The provider identified herself as well as her credentials. The patient, and/or patients guardian, consent to be seen remotely, and when consent is given they understand that the consent allows for patient identifiable information to be sent to a third party as needed. They may refuse to be seen remotely at any time. The electronic data is encrypted and password protected, and the patient and/or guardian has been advised of the potential risks to privacy not withstanding such measures.     You have chosen to receive care through a telehealth visit.  Do you consent to use a video/audio connection for your medical care today? Yes    PROGRESS NOTE  Data:  Frank Champion is a 35 y.o. male who presents today for follow up    Chief Complaint: ADHD     History of Present Illness: Patient reported cause for increased anxiety which was related to employment issues.  Patient explained receiving a letter that was unexpected and the steps to take to not only lower his anxiety levels but to address the presenting issue immediately rather than dwell on possible what if scenarios.  Patient discussed difficulty these last few weeks due to wife's depression state associated with her bipolar disorder and having to put her family dog down due to health reasons.  Patient inquired a couple questions associated with how to be supportive for his wife explaining that she will be undergoing EMDR sessions.  Patient also discussed the need to address cognitive distortions associated with  assumptions.  Patient then discussed the need to address impulse control and how not to lash out when angry or minimize the issue that caused him to feel a certain way and avoid addressing altogether which has a tendency to cause resentments in the future.    Clinical Maneuvering/Intervention:    (Scales based on 0 - 10 with 10 being the worst)  Depression: 4 Anxiety: 6       Assisted patient in processing above session content; acknowledged and normalized patient’s thoughts, feelings, and concerns.  Rationalized patient thought process regarding steps taken and plan to take addressing anxiety factors such as unemployment letter. discussed triggers associated with patient's heightened anxiety and depression. also discussed coping skills for patient to implement such as addressing negative thoughts and practicing impulse control.    Allowed patient to freely discuss issues without interruption or judgment. Provided safe, confidential environment to facilitate the development of positive therapeutic relationship and encourage open, honest communication. Assisted patient in identifying risk factors which would indicate the need for higher level of care including thoughts to harm self or others and/or self-harming behavior and encouraged patient to contact this office, call 911, or present to the nearest emergency room should any of these events occur. Discussed crisis intervention services and means to access. Patient adamantly and convincingly denies current suicidal or homicidal ideation or perceptual disturbance.    Assessment:   Assessment   Patient appears to maintain relative stability as compared to their baseline.  However, patient continues to struggle with ADHD and anxiety which continues to cause impairment in important areas of functioning.  A result, they can be reasonably expected to continue to benefit from treatment and would likely be at increased risk for decompensation otherwise.    Mental Status  Exam:   Hygiene:   good  Cooperation:  Cooperative  Eye Contact:  Good  Psychomotor Behavior:  Appropriate  Affect:  Appropriate  Mood: normal  Speech:  Normal  Thought Process:  Linear  Thought Content:  Normal and Mood congruent  Suicidal:  None  Homicidal:  None  Hallucinations:  None  Delusion:  None  Memory:  Intact  Orientation:  Person, Place, Time and Situation  Reliability:  good  Insight:  Good  Judgement:  Good  Impulse Control:  Good  Physical/Medical Issues:  No        Patient's Support Network Includes:  wife    Functional Status: Mild impairment     Progress toward goal: Not at goal    Prognosis: Good with Ongoing Treatment          Plan:    Patient will continue in individual outpatient therapy with focus on improved functioning and coping skills, maintaining stability, and avoiding decompensation and the need for higher level of care.    Patient will adhere to medication regimen as prescribed and report any side effects. Patient will contact this office, call 911 or present to the nearest emergency room should suicidal or homicidal ideations occur. Provide Cognitive Behavioral Therapy and Solution Focused Therapy to improve functioning, maintain stability, and avoid decompensation and the need for higher level of care.     Return in about 2 weeks, or earlier if symptoms worsen or fail to improve.           VISIT DIAGNOSIS:     ICD-10-CM ICD-9-CM   1. Attention deficit hyperactivity disorder (ADHD), predominantly inattentive type  F90.0 314.00   2. Generalized anxiety disorder  F41.1 300.02             This document has been electronically signed by Julienne Cobb LCSW  April 5, 2021 15:26 EDT      Part of this note may be an electronic transcription/translation of spoken language to printed text using the Dragon Dictation System.

## 2021-04-15 ENCOUNTER — OFFICE VISIT (OUTPATIENT)
Dept: FAMILY MEDICINE CLINIC | Facility: CLINIC | Age: 36
End: 2021-04-15

## 2021-04-15 ENCOUNTER — LAB (OUTPATIENT)
Dept: LAB | Facility: HOSPITAL | Age: 36
End: 2021-04-15

## 2021-04-15 VITALS
HEIGHT: 72 IN | SYSTOLIC BLOOD PRESSURE: 128 MMHG | OXYGEN SATURATION: 97 % | DIASTOLIC BLOOD PRESSURE: 72 MMHG | HEART RATE: 89 BPM | BODY MASS INDEX: 35.3 KG/M2 | WEIGHT: 260.6 LBS

## 2021-04-15 DIAGNOSIS — Z00.00 WELL ADULT EXAM: ICD-10-CM

## 2021-04-15 DIAGNOSIS — Z00.00 WELL ADULT EXAM: Primary | ICD-10-CM

## 2021-04-15 DIAGNOSIS — E78.1 HYPERTRIGLYCERIDEMIA: Chronic | ICD-10-CM

## 2021-04-15 DIAGNOSIS — Z13.0 SCREENING FOR DEFICIENCY ANEMIA: ICD-10-CM

## 2021-04-15 DIAGNOSIS — R79.89 LOW TESTOSTERONE: ICD-10-CM

## 2021-04-15 DIAGNOSIS — I10 ESSENTIAL HYPERTENSION: Chronic | ICD-10-CM

## 2021-04-15 DIAGNOSIS — R05.3 CHRONIC COUGH: ICD-10-CM

## 2021-04-15 DIAGNOSIS — E66.01 MORBID OBESITY (HCC): ICD-10-CM

## 2021-04-15 DIAGNOSIS — Z23 NEED FOR VACCINATION: ICD-10-CM

## 2021-04-15 PROBLEM — F90.2 ATTENTION DEFICIT HYPERACTIVITY DISORDER (ADHD), COMBINED TYPE: Chronic | Status: ACTIVE | Noted: 2020-10-29

## 2021-04-15 LAB
ALBUMIN SERPL-MCNC: 4.8 G/DL (ref 3.5–5.2)
ALBUMIN/GLOB SERPL: 1.5 G/DL
ALP SERPL-CCNC: 58 U/L (ref 39–117)
ALT SERPL W P-5'-P-CCNC: 46 U/L (ref 1–41)
ANION GAP SERPL CALCULATED.3IONS-SCNC: 9.9 MMOL/L (ref 5–15)
AST SERPL-CCNC: 25 U/L (ref 1–40)
BILIRUB SERPL-MCNC: 0.3 MG/DL (ref 0–1.2)
BUN SERPL-MCNC: 21 MG/DL (ref 6–20)
BUN/CREAT SERPL: 22.1 (ref 7–25)
CALCIUM SPEC-SCNC: 9.8 MG/DL (ref 8.6–10.5)
CHLORIDE SERPL-SCNC: 104 MMOL/L (ref 98–107)
CHOLEST SERPL-MCNC: 190 MG/DL (ref 0–200)
CO2 SERPL-SCNC: 25.1 MMOL/L (ref 22–29)
CREAT SERPL-MCNC: 0.95 MG/DL (ref 0.76–1.27)
DEPRECATED RDW RBC AUTO: 39.4 FL (ref 37–54)
ERYTHROCYTE [DISTWIDTH] IN BLOOD BY AUTOMATED COUNT: 12.2 % (ref 12.3–15.4)
GFR SERPL CREATININE-BSD FRML MDRD: 91 ML/MIN/1.73
GLOBULIN UR ELPH-MCNC: 3.2 GM/DL
GLUCOSE SERPL-MCNC: 89 MG/DL (ref 65–99)
HCT VFR BLD AUTO: 40.8 % (ref 37.5–51)
HDLC SERPL-MCNC: 36 MG/DL (ref 40–60)
HGB BLD-MCNC: 14.2 G/DL (ref 13–17.7)
LDLC SERPL CALC-MCNC: 128 MG/DL (ref 0–100)
LDLC/HDLC SERPL: 3.49 {RATIO}
MCH RBC QN AUTO: 30.9 PG (ref 26.6–33)
MCHC RBC AUTO-ENTMCNC: 34.8 G/DL (ref 31.5–35.7)
MCV RBC AUTO: 88.9 FL (ref 79–97)
PLATELET # BLD AUTO: 385 10*3/MM3 (ref 140–450)
PMV BLD AUTO: 11.6 FL (ref 6–12)
POTASSIUM SERPL-SCNC: 5.4 MMOL/L (ref 3.5–5.2)
PROT SERPL-MCNC: 8 G/DL (ref 6–8.5)
RBC # BLD AUTO: 4.59 10*6/MM3 (ref 4.14–5.8)
SODIUM SERPL-SCNC: 139 MMOL/L (ref 136–145)
TRIGL SERPL-MCNC: 142 MG/DL (ref 0–150)
TSH SERPL DL<=0.05 MIU/L-ACNC: 1.82 UIU/ML (ref 0.27–4.2)
VLDLC SERPL-MCNC: 26 MG/DL (ref 5–40)
WBC # BLD AUTO: 9 10*3/MM3 (ref 3.4–10.8)

## 2021-04-15 PROCEDURE — 36415 COLL VENOUS BLD VENIPUNCTURE: CPT

## 2021-04-15 PROCEDURE — 80061 LIPID PANEL: CPT

## 2021-04-15 PROCEDURE — 99395 PREV VISIT EST AGE 18-39: CPT | Performed by: FAMILY MEDICINE

## 2021-04-15 PROCEDURE — 90471 IMMUNIZATION ADMIN: CPT | Performed by: FAMILY MEDICINE

## 2021-04-15 PROCEDURE — 80053 COMPREHEN METABOLIC PANEL: CPT

## 2021-04-15 PROCEDURE — 85027 COMPLETE CBC AUTOMATED: CPT

## 2021-04-15 PROCEDURE — 84443 ASSAY THYROID STIM HORMONE: CPT

## 2021-04-15 PROCEDURE — 90715 TDAP VACCINE 7 YRS/> IM: CPT | Performed by: FAMILY MEDICINE

## 2021-04-15 RX ORDER — TESTOSTERONE 12.5 MG/1.25G
1 GEL TOPICAL EVERY MORNING
Qty: 75 G | Refills: 2 | Status: SHIPPED | OUTPATIENT
Start: 2021-04-15 | End: 2021-07-09 | Stop reason: SDUPTHER

## 2021-04-15 RX ORDER — LOSARTAN POTASSIUM 50 MG/1
50 TABLET ORAL DAILY
Qty: 30 TABLET | Refills: 5 | Status: SHIPPED | OUTPATIENT
Start: 2021-04-15 | End: 2021-05-24 | Stop reason: SDUPTHER

## 2021-04-15 NOTE — PROGRESS NOTES
"Chief Complaint  Annual Exam (patient would like to discuss testosterone therapy, states that with his job it is really hard for him to be able to take off and come in for injection, would like to discuss topical)    Subjective          Frank Champion presents to [facility] for   History of Present Illness     He has a chronic cough and concerned about lisinopril.     He has not received his testosterone injections lately as his new job did not allow him to take the time off.  He is interested in switching to a testosterone cream.    Objective   Vital Signs:   Vitals:    04/15/21 0817   BP: 128/72   BP Location: Left arm   Patient Position: Sitting   Cuff Size: Adult   Pulse: 89   SpO2: 97%   Weight: 118 kg (260 lb 9.6 oz)   Height: 181.6 cm (71.5\")       Physical Exam  Constitutional:       General: He is not in acute distress.     Appearance: He is obese.   HENT:      Right Ear: Tympanic membrane and ear canal normal.      Left Ear: Tympanic membrane and ear canal normal.   Eyes:      General:         Right eye: No discharge.         Left eye: No discharge.      Conjunctiva/sclera: Conjunctivae normal.   Neck:      Thyroid: No thyromegaly.   Cardiovascular:      Rate and Rhythm: Normal rate and regular rhythm.      Heart sounds: Normal heart sounds.   Pulmonary:      Effort: Pulmonary effort is normal.      Breath sounds: Normal breath sounds.   Abdominal:      General: Bowel sounds are normal.      Palpations: Abdomen is soft.      Tenderness: There is no abdominal tenderness.   Musculoskeletal:      Cervical back: Neck supple.   Lymphadenopathy:      Head:      Right side of head: No submandibular, preauricular or posterior auricular adenopathy.      Left side of head: No submandibular, preauricular or posterior auricular adenopathy.      Cervical: No cervical adenopathy.   Skin:     General: Skin is warm and dry.   Neurological:      Mental Status: He is alert and oriented to " person, place, and time.   Psychiatric:         Mood and Affect: Mood normal.         Behavior: Behavior normal.         Thought Content: Thought content normal.         Judgment: Judgment normal.        Result Review :   The following data was reviewed by: Magaly De Jesus MD on 04/15/2021:           Urine Drug Screen - Urine, Clean Catch (01/25/2021 13:31)  Urine Drug Screen - Urine, Clean Catch (12/23/2020)  Testosterone (09/23/2020 09:06)      Immunization History   Administered Date(s) Administered   • COVID-19 (MODERNA) 02/12/2021, 03/19/2021   • Flulaval/Fluarix/Fluzone Quad 09/23/2020   • Hepatitis B 07/01/1998, 08/05/1998, 12/16/1998   • Influenza, Unspecified 08/15/2019   • MMR 07/01/1998   • Td 12/16/1998   • Tdap 04/15/2021       Health Maintenance   Topic Date Due   • INFLUENZA VACCINE  08/01/2021   • LIPID PANEL  08/14/2021   • TDAP/TD VACCINES (3 - Td) 04/15/2031            Assessment and Plan    Diagnoses and all orders for this visit:    1. Well adult exam (Primary)  -     CBC (No Diff); Future  -     Comprehensive Metabolic Panel; Future  -     TSH Rfx On Abnormal To Free T4; Future  -     Lipid Panel; Future  Check fasting labs today.  With family history of colon polyps will start colonoscopies age 41.  2. Essential hypertension  -     Comprehensive Metabolic Panel; Future  -     TSH Rfx On Abnormal To Free T4; Future  -     Lipid Panel; Future  -     losartan (COZAAR) 50 MG tablet; Take 1 tablet by mouth Daily.  Dispense: 30 tablet; Refill: 5  Side effects of cough possibly from lisinopril.  Discontinue lisinopril and start losartan.  Reassess next month.  3. Hypertriglyceridemia  -     Comprehensive Metabolic Panel; Future  -     Lipid Panel; Future  Check fasting labs today.  4. Low testosterone  -     testosterone 12.5 MG/ACT (1%) gel; Place 1 Pump on the skin as directed by provider Every Morning.  Dispense: 75 g; Refill: 2  Start topical testosterone therapy.  Discussed  application and risks especially with children and women.  [place]ando report reviewed.  Controlled substance agreement on file.  Reassess next month.  5. Morbid obesity (CMS/HCC)  Patient's (Body mass index is 35.84 kg/m².) indicates that they are morbidly obese (BMI > 40 or > 35 with obesity - related health condition) with obesity-related health conditions that include hypertension and dyslipidemias . Obesity is improving with lifestyle modifications. BMI is is above average; BMI management plan is completed. We discussed DASH diet.     6. Screening for deficiency anemia  -     CBC (No Diff); Future    7. Chronic cough  Possibly related to lisinopril.  Discontinue medication as above and starting losartan.  8. Need for vaccination  -     Tdap Vaccine Greater Than or Equal To 6yo IM        Counseled on health maintenance topics and preventative care recommendations: Tdap vaccine, colon cancer screening, prostate cancer screen      Follow Up   Return in about 1 month (around 5/15/2021) for Office visit testosterone,m HTN, physical 1 year.  Patient was given instructions and counseling regarding his condition or for health maintenance advice. Please see specific information pulled into the AVS if appropriate.   Electronically signed by Magaly De Jesus MD, 04/15/21, 9:49 AM EDT.

## 2021-04-15 NOTE — PATIENT INSTRUCTIONS
"https://www.nhlbi.nih.gov/files/docs/public/heart/dash_brief.pdf\">   DASH Eating Plan  DASH stands for Dietary Approaches to Stop Hypertension. The DASH eating plan is a healthy eating plan that has been shown to:  · Reduce high blood pressure (hypertension).  · Reduce your risk for type 2 diabetes, heart disease, and stroke.  · Help with weight loss.  What are tips for following this plan?  Reading food labels  · Check food labels for the amount of salt (sodium) per serving. Choose foods with less than 5 percent of the Daily Value of sodium. Generally, foods with less than 300 milligrams (mg) of sodium per serving fit into this eating plan.  · To find whole grains, look for the word \"whole\" as the first word in the ingredient list.  Shopping  · Buy products labeled as \"low-sodium\" or \"no salt added.\"  · Buy fresh foods. Avoid canned foods and pre-made or frozen meals.  Cooking  · Avoid adding salt when cooking. Use salt-free seasonings or herbs instead of table salt or sea salt. Check with your health care provider or pharmacist before using salt substitutes.  · Do not marie foods. Cook foods using healthy methods such as baking, boiling, grilling, roasting, and broiling instead.  · Cook with heart-healthy oils, such as olive, canola, avocado, soybean, or sunflower oil.  Meal planning    · Eat a balanced diet that includes:  ? 4 or more servings of fruits and 4 or more servings of vegetables each day. Try to fill one-half of your plate with fruits and vegetables.  ? 6-8 servings of whole grains each day.  ? Less than 6 oz (170 g) of lean meat, poultry, or fish each day. A 3-oz (85-g) serving of meat is about the same size as a deck of cards. One egg equals 1 oz (28 g).  ? 2-3 servings of low-fat dairy each day. One serving is 1 cup (237 mL).  ? 1 serving of nuts, seeds, or beans 5 times each week.  ? 2-3 servings of heart-healthy fats. Healthy fats called omega-3 fatty acids are found in foods such as walnuts, " flaxseeds, fortified milks, and eggs. These fats are also found in cold-water fish, such as sardines, salmon, and mackerel.  · Limit how much you eat of:  ? Canned or prepackaged foods.  ? Food that is high in trans fat, such as some fried foods.  ? Food that is high in saturated fat, such as fatty meat.  ? Desserts and other sweets, sugary drinks, and other foods with added sugar.  ? Full-fat dairy products.  · Do not salt foods before eating.  · Do not eat more than 4 egg yolks a week.  · Try to eat at least 2 vegetarian meals a week.  · Eat more home-cooked food and less restaurant, buffet, and fast food.  Lifestyle  · When eating at a restaurant, ask that your food be prepared with less salt or no salt, if possible.  · If you drink alcohol:  ? Limit how much you use to:  § 0-1 drink a day for women who are not pregnant.  § 0-2 drinks a day for men.  ? Be aware of how much alcohol is in your drink. In the [place] one drink equals one 12 oz bottle of beer (355 mL), one 5 oz glass of wine (148 mL), or one 1½ oz glass of hard liquor (44 mL).  General information  · Avoid eating more than 2,300 mg of salt a day. If you have hypertension, you may need to reduce your sodium intake to 1,500 mg a day.  · Work with your health care provider to maintain a healthy body weight or to lose weight. Ask what an ideal weight is for you.  · Get at least 30 minutes of exercise that causes your heart to beat faster (aerobic exercise) most days of the week. Activities may include walking, swimming, or biking.  · Work with your health care provider or dietitian to adjust your eating plan to your individual calorie needs.  What foods should I eat?  Fruits  All fresh, dried, or frozen fruit. Canned fruit in natural juice (without added sugar).  Vegetables  Fresh or frozen vegetables (raw, steamed, roasted, or grilled). Low-sodium or reduced-sodium tomato and vegetable juice. Low-sodium or reduced-sodium tomato sauce and tomato paste.  Low-sodium or reduced-sodium canned vegetables.  Grains  Whole-grain or whole-wheat bread. Whole-grain or whole-wheat pasta. Brown rice. Oatmeal. Quinoa. Bulgur. Whole-grain and low-sodium cereals. Jessenia bread. Low-fat, low-sodium crackers. Whole-wheat flour tortillas.  Meats and other proteins  Skinless chicken or turkey. Ground chicken or turkey. Pork with fat trimmed off. Fish and seafood. Egg whites. Dried beans, peas, or lentils. Unsalted nuts, nut butters, and seeds. Unsalted canned beans. Lean cuts of beef with fat trimmed off. Low-sodium, lean precooked or cured meat, such as sausages or meat loaves.  Dairy  Low-fat (1%) or fat-free (skim) milk. Reduced-fat, low-fat, or fat-free cheeses. Nonfat, low-sodium ricotta or cottage cheese. Low-fat or nonfat yogurt. Low-fat, low-sodium cheese.  Fats and oils  Soft margarine without trans fats. Vegetable oil. Reduced-fat, low-fat, or light mayonnaise and salad dressings (reduced-sodium). Canola, safflower, olive, avocado, soybean, and sunflower oils. Avocado.  Seasonings and condiments  Herbs. Spices. Seasoning mixes without salt.  Other foods  Unsalted popcorn and pretzels. Fat-free sweets.  The items listed above may not be a complete list of foods and beverages you can eat. Contact a dietitian for more information.  What foods should I avoid?  Fruits  Canned fruit in a light or heavy syrup. Fried fruit. Fruit in cream or butter sauce.  Vegetables  Creamed or fried vegetables. Vegetables in a cheese sauce. Regular canned vegetables (not low-sodium or reduced-sodium). Regular canned tomato sauce and paste (not low-sodium or reduced-sodium). Regular tomato and vegetable juice (not low-sodium or reduced-sodium). Pickles. Olives.  Grains  Baked goods made with fat, such as croissants, muffins, or some breads. Dry pasta or rice meal packs.  Meats and other proteins  Fatty cuts of meat. Ribs. Fried meat. King. Bologna, salami, and other precooked or cured meats, such as  sausages or meat loaves. Fat from the back of a pig (fatback). Bratwurst. Salted nuts and seeds. Canned beans with added salt. Canned or smoked fish. Whole eggs or egg yolks. Chicken or turkey with skin.  Dairy  Whole or 2% milk, cream, and half-and-half. Whole or full-fat cream cheese. Whole-fat or sweetened yogurt. Full-fat cheese. Nondairy creamers. Whipped toppings. Processed cheese and cheese spreads.  Fats and oils  Butter. Stick margarine. Lard. Shortening. Ghee. King fat. Tropical oils, such as coconut, palm kernel, or palm oil.  Seasonings and condiments  Onion salt, garlic salt, seasoned salt, table salt, and sea salt. Worcestershire sauce. Tartar sauce. Barbecue sauce. Teriyaki sauce. Soy sauce, including reduced-sodium. Steak sauce. Canned and packaged gravies. Fish sauce. Oyster sauce. Cocktail sauce. Store-bought horseradish. Ketchup. Mustard. Meat flavorings and tenderizers. Bouillon cubes. Hot sauces. Pre-made or packaged marinades. Pre-made or packaged taco seasonings. Relishes. Regular salad dressings.  Other foods  Salted popcorn and pretzels.  The items listed above may not be a complete list of foods and beverages you should avoid. Contact a dietitian for more information.  Where to find more information  · National Heart, Lung, and Blood [place]: www.nhlbi.nih.gov  · American Heart Association: www.heart.org  · Academy of Nutrition and Dietetics: www.eatright.org  · National Kidney Foundation: www.kidney.org  Summary  · The DASH eating plan is a healthy eating plan that has been shown to reduce high blood pressure (hypertension). It may also reduce your risk for type 2 diabetes, heart disease, and stroke.  · When on the DASH eating plan, aim to eat more fresh fruits and vegetables, whole grains, lean proteins, low-fat dairy, and heart-healthy fats.  · With the DASH eating plan, you should limit salt (sodium) intake to 2,300 mg a day. If you have hypertension, you may need to reduce your  sodium intake to 1,500 mg a day.  · Work with your health care provider or dietitian to adjust your eating plan to your individual calorie needs.  This information is not intended to replace advice given to you by your health care provider. Make sure you discuss any questions you have with your health care provider.  Document Revised: 11/20/2020 Document Reviewed: 11/20/2020  [employer] Patient Education © 2021 [employer]

## 2021-04-19 ENCOUNTER — TELEMEDICINE (OUTPATIENT)
Dept: PSYCHIATRY | Facility: CLINIC | Age: 36
End: 2021-04-19

## 2021-04-19 DIAGNOSIS — F41.1 GENERALIZED ANXIETY DISORDER: Chronic | ICD-10-CM

## 2021-04-19 DIAGNOSIS — F90.0 ATTENTION DEFICIT HYPERACTIVITY DISORDER (ADHD), PREDOMINANTLY INATTENTIVE TYPE: Primary | Chronic | ICD-10-CM

## 2021-04-19 DIAGNOSIS — F33.0 MILD EPISODE OF RECURRENT MAJOR DEPRESSIVE DISORDER (HCC): Chronic | ICD-10-CM

## 2021-04-19 PROCEDURE — 99214 OFFICE O/P EST MOD 30 MIN: CPT | Performed by: NURSE PRACTITIONER

## 2021-04-19 RX ORDER — BUPROPION HYDROCHLORIDE 300 MG/1
300 TABLET ORAL DAILY
Qty: 90 TABLET | Refills: 0 | Status: SHIPPED | OUTPATIENT
Start: 2021-04-19 | End: 2021-07-07 | Stop reason: SDUPTHER

## 2021-04-19 RX ORDER — DEXTROAMPHETAMINE SACCHARATE, AMPHETAMINE ASPARTATE MONOHYDRATE, DEXTROAMPHETAMINE SULFATE AND AMPHETAMINE SULFATE 5; 5; 5; 5 MG/1; MG/1; MG/1; MG/1
20 CAPSULE, EXTENDED RELEASE ORAL EVERY MORNING
Qty: 30 CAPSULE | Refills: 0 | Status: SHIPPED | OUTPATIENT
Start: 2021-04-19 | End: 2021-05-13

## 2021-04-19 NOTE — PROGRESS NOTES
"This provider is located at the [facility] (through ), [address], [facility], [address] using a secure [employer] Video Visit through [employer]. Patient is being seen remotely via telehealth at their home address in [place], and stated they are in a secure environment for this session. The patient's condition being diagnosed/treated is appropriate for telemedicine. The provider identified herself as well as her credentials. The patient, and/or patients guardian, consent to be seen remotely, and when consent is given they understand that the consent allows for patient identifiable information to be sent to a third party as needed. They may refuse to be seen remotely at any time. The electronic data is encrypted and password protected, and the patient and/or guardian has been advised of the potential risks to privacy not withstanding such measures.    You have chosen to receive care through a telehealth visit.  Do you consent to use a video/audio connection for your medical care today? Yes     Subjective   Frank Champion is a 35 y.o. male who is here today for medication management follow up.    Chief Complaint: Depression, anxiety, ADHD    History of Present Illness: The patient describes his mood as \"good\" over the last few weeks.  Patient states that things have been going well for him over the past few weeks. Patient states his mood and anxiety have been well-controlled. Patient states that he has noticed a reduction in ADHD symptoms since increase in Adderall XR. Patient states that most days he can focus and concentrate, but states there are still some days that he has difficulty. Patient states that overall he has noticed an improvement in symptoms.  The patient reports his appetite as good.  The patient reports his sleep as good.  The patient denies any nightmares or bad dreams.  The patient reports compliance with current medication regimen.  The patient " denies any current side effects from his current medication regimen.  The patient denies any abnormal muscle movements or tics.  The patient rates his ADHD 5/10 on a 0-10 scale, with 10 being the worst.  The patient rates his depression at a 2-3/10 on a 0-10 scale, with 10 being the worst.  The patient rates his anxiety at a 2-3/10 on a 0-10 scale, with 10 being the worst.  The patient rates hopelessness at a 0/10 on a 0-10 scale with 10 being the worst.  The patient would like to increase his ADHD medication at this visit and not adjust/change his Wellbutrin.  The patient denies suicidal ideations and homicidal ideations, and is convincing.  The patient denies any auditory hallucinations or visual hallucinations.  The patient does not endorse significant symptoms consistent with bipolar disorder or a psychotic illness.            The following portions of the patient's history were reviewed and updated as appropriate: allergies, current medications, past family history, past medical history, past social history, past surgical history and problem list.    Review of Systems   Constitutional: Negative for activity change, appetite change, fatigue and unexpected weight change.   Psychiatric/Behavioral: Positive for decreased concentration. Negative for agitation, behavioral problems, confusion, dysphoric mood, hallucinations, self-injury, sleep disturbance and suicidal ideas. The patient is not nervous/anxious and is not hyperactive.        Objective   Physical Exam  Constitutional:       Appearance: Normal appearance.   Neurological:      Mental Status: He is alert.   Psychiatric:         Attention and Perception: Perception normal. He is inattentive.         Mood and Affect: Mood and affect normal.         Speech: Speech normal.         Behavior: Behavior normal. Behavior is cooperative.         Thought Content: Thought content normal. Thought content does not include homicidal or suicidal ideation. Thought content  does not include homicidal or suicidal plan.         Cognition and Memory: Cognition and memory normal.         Judgment: Judgment normal.       There were no vitals taken for this visit.    The patient was seen remotely today via a MyChart Video Visit through [facility].  Unable to obtain vital signs due to nature of remote visit.  Height stated at 71.5 inches.  Weight stated at 260 pounds.     Allergies   Allergen Reactions   • Guanfacine Other (See Comments)     sedation   • Strattera [Atomoxetine] Other (See Comments)     Erectile dysfunction, difficulty urinating, urinary incontinence   • Sulfa Antibiotics GI Intolerance       Recent Results (from the past 2016 hour(s))   CBC (No Diff)    Collection Time: 04/15/21  8:47 AM    Specimen: Blood   Result Value Ref Range    WBC 9.00 3.40 - 10.80 10*3/mm3    RBC 4.59 4.14 - 5.80 10*6/mm3    Hemoglobin 14.2 13.0 - 17.7 g/dL    Hematocrit 40.8 37.5 - 51.0 %    MCV 88.9 79.0 - 97.0 fL    MCH 30.9 26.6 - 33.0 pg    MCHC 34.8 31.5 - 35.7 g/dL    RDW 12.2 (L) 12.3 - 15.4 %    RDW-SD 39.4 37.0 - 54.0 fl    MPV 11.6 6.0 - 12.0 fL    Platelets 385 140 - 450 10*3/mm3   Comprehensive Metabolic Panel    Collection Time: 04/15/21  8:47 AM    Specimen: Blood   Result Value Ref Range    Glucose 89 65 - 99 mg/dL    BUN 21 (H) 6 - 20 mg/dL    Creatinine 0.95 0.76 - 1.27 mg/dL    Sodium 139 136 - 145 mmol/L    Potassium 5.4 (H) 3.5 - 5.2 mmol/L    Chloride 104 98 - 107 mmol/L    CO2 25.1 22.0 - 29.0 mmol/L    Calcium 9.8 8.6 - 10.5 mg/dL    Total Protein 8.0 6.0 - 8.5 g/dL    Albumin 4.80 3.50 - 5.20 g/dL    ALT (SGPT) 46 (H) 1 - 41 U/L    AST (SGOT) 25 1 - 40 U/L    Alkaline Phosphatase 58 39 - 117 U/L    Total Bilirubin 0.3 0.0 - 1.2 mg/dL    eGFR Non African Amer 91 >60 mL/min/1.73    Globulin 3.2 gm/dL    A/G Ratio 1.5 g/dL    BUN/Creatinine Ratio 22.1 7.0 - 25.0    Anion Gap 9.9 5.0 - 15.0 mmol/L   TSH Rfx On Abnormal To Free T4    Collection Time: 04/15/21  8:47 AM    Specimen: Blood     Result Value Ref Range    TSH 1.820 0.270 - 4.200 uIU/mL   Lipid Panel    Collection Time: 04/15/21  8:47 AM    Specimen: Blood   Result Value Ref Range    Total Cholesterol 190 0 - 200 mg/dL    Triglycerides 142 0 - 150 mg/dL    HDL Cholesterol 36 (L) 40 - 60 mg/dL    LDL Cholesterol  128 (H) 0 - 100 mg/dL    VLDL Cholesterol 26 5 - 40 mg/dL    LDL/HDL Ratio 3.49        Current Medications:   Current Outpatient Medications   Medication Sig Dispense Refill   • amphetamine-dextroamphetamine XR (ADDERALL XR) 20 MG 24 hr capsule Take 1 capsule by mouth Every Morning 30 capsule 0   • buPROPion XL (Wellbutrin XL) 300 MG 24 hr tablet Take 1 tablet by mouth Daily. 90 tablet 0   • lansoprazole (PREVACID) 15 MG capsule Take 15 mg by mouth Daily.     • losartan (COZAAR) 50 MG tablet Take 1 tablet by mouth Daily. 30 tablet 5   • testosterone 12.5 MG/ACT (1%) gel Place 1 Pump on the skin as directed by provider Every Morning. 75 g 2     No current facility-administered medications for this visit.       Mental Status Exam:   Hygiene:   good  Cooperation:  Cooperative  Eye Contact:  Good  Psychomotor Behavior:  Appropriate  Affect:  Full range  Hopelessness: Denies  Speech:  Normal  Thought Process:  Goal directed  Thought Content:  Normal  Suicidal:  None  Homicidal:  None  Hallucinations:  None  Delusion:  None  Memory:  Intact  Orientation:  Person, Place, Time and Situation  Reliability:  good  Insight:  Good  Judgement:  Good  Impulse Control:  Good  Physical/Medical Issues:  Yes See medical history       Assessment/Plan   Diagnoses and all orders for this visit:    1. Attention deficit hyperactivity disorder (ADHD), predominantly inattentive type (Primary)  -     amphetamine-dextroamphetamine XR (ADDERALL XR) 20 MG 24 hr capsule; Take 1 capsule by mouth Every Morning  Dispense: 30 capsule; Refill: 0    2. Generalized anxiety disorder  -     buPROPion XL (Wellbutrin XL) 300 MG 24 hr tablet; Take 1 tablet by mouth Daily.   Dispense: 90 tablet; Refill: 0    3. Mild episode of recurrent major depressive disorder (CMS/HCC)  -     buPROPion XL (Wellbutrin XL) 300 MG 24 hr tablet; Take 1 tablet by mouth Daily.  Dispense: 90 tablet; Refill: 0            Visit Diagnoses:    ICD-10-CM ICD-9-CM   1. Attention deficit hyperactivity disorder (ADHD), predominantly inattentive type  F90.0 314.00   2. Generalized anxiety disorder  F41.1 300.02   3. Mild episode of recurrent major depressive disorder (CMS/HCC)  F33.0 296.31       GOALS:  Short Term Goals: Patient will be compliant with medication, and patient will have no significant medication related side effects.  Patient will be engaged in psychotherapy as indicated.  Patient will report subjective improvement of symptoms.  Long term goals: To stabilize mood and treat/improve subjective symptoms, the patient will stay out of the hospital, the patient will be at an optimal level of functioning, and the patient will take all medications as prescribed.  The patient verbalized understanding and agreement with goals that were mutually set.      TREATMENT PLAN/GOALS: Continue medications and treatment plan as indicated. Treatment and medication options discussed during today's visit. Patient acknowledged and verbally consented to continue with current treatment plan and was educated on the importance of compliance with treatment and follow-up appointments.      -Increase Adderall XR to 20 mg daily  -Continue Wellbutrin  mg daily  -Continue psychotherapy      MEDICATION ISSUES: Discussed medication options and treatment plan of prescribed medication, any off label use of medication, as well as the risks, benefits, any black box warnings including increased suicidality, and side effects including but not limited to potential falls, dizziness, possible impaired driving, GI side effects (change in appetite, abdominal discomfort, nausea, vomiting, diarrhea, and/or constipation), dry mouth,  somnolence, sedation, insomnia, activation, agitation, irritation, tremors, abnormal muscle movements or disorders, headache, sweating, possible bruising or rare bleeding, electrolyte and/or fluid abnormalities, change in blood pressure/heart rate/and or heart rhythm, sexual dysfunction, and metabolic adversities among others. Patient and/or guardian agreeable to call the office with any worsening of symptoms or onset of side effects, or if any concerns or questions arise.  The contact information for the office is made available to the patient and/or guardian.  Patient and/or guardian agreeable to call 911 or go to the nearest ER should they begin having any SI/HI, or if any urgent concerns arise. No medication side effects or related complaints today.    The patient is being prescribed a controlled substance as part of the treatment plan. The patient/guardian has been educated of appropriate use of the medication(s), including risks and side effects such as insomnia, headache, exacerbation of tics, nervousness, irritability, overstimulation, tremor, dizziness, anorexia or change in appetite, nausea, dry mouth, constipation, diarrhea, weight loss, sexual dysfunction, psychotic episodes, seizures, palpitations, tachycardia, hypertension, rare activation (activation of hypomania, chelsie, and/or suicidal ideations), cardiovascular adverse effects including sudden death (especially patients with pre-existing structural abnormalities often associated with a family history of cardiac disease), may slow normal growth in children, weight gain is reported but not expected, sedation is possible but activation is much more common, metabolic adversities, and overdose among others. Patient/guardian is also informed that the medication is to be used by the patient only, the medication is to be used only as directed, and the medication should not be combined with other substances unless directed by a Provider/Prescriber. The  patient/guardian verbalized understanding and agreement with this in their own words, and wish to continue with current treatment plan.    Without the prescribed medication for ADHD, it is reported that the patient has problems with attention and focus including being easily distracted, easily losing objects, trouble with time management, trouble completing tasks because of distractions, procrastination, indecisiveness, careless mistakes in daily activities/work/school, and not finishing jobs that are started.  Patient denies any side effects, no worsening of insomnia, and no worsening of anxiety on the medication dose.  Due to the reported problems without the medication usage, as well as the significant improvement in symptoms with the medication usage, the patient requests to remain on the prescribed medication for ADHD.      MEDS ORDERED DURING VISIT:  New Medications Ordered This Visit   Medications   • amphetamine-dextroamphetamine XR (ADDERALL XR) 20 MG 24 hr capsule     Sig: Take 1 capsule by mouth Every Morning     Dispense:  30 capsule     Refill:  0   • buPROPion XL (Wellbutrin XL) 300 MG 24 hr tablet     Sig: Take 1 tablet by mouth Daily.     Dispense:  90 tablet     Refill:  0       Return in about 4 weeks (around 5/17/2021), or if symptoms worsen or fail to improve, for Recheck.       Patient will follow-up in 4 weeks, highly encouraged the patient if he had any questions or concerns to contact the behavioral health [facility] clinic for sooner appointment patient verbalized understanding.      Functional Status: Mild impairment     Prognosis: Fair with Ongoing Treatment             This document has been electronically signed by BETTY Sandy  April 19, 2021 16:39 EDT    Part of this note may be an electronic transcription/translation of spoken language to printed text using the Dragon Dictation System.

## 2021-05-13 ENCOUNTER — TELEMEDICINE (OUTPATIENT)
Dept: PSYCHIATRY | Facility: CLINIC | Age: 36
End: 2021-05-13

## 2021-05-13 DIAGNOSIS — F41.1 GENERALIZED ANXIETY DISORDER: Chronic | ICD-10-CM

## 2021-05-13 DIAGNOSIS — F90.0 ATTENTION DEFICIT HYPERACTIVITY DISORDER (ADHD), PREDOMINANTLY INATTENTIVE TYPE: Primary | Chronic | ICD-10-CM

## 2021-05-13 DIAGNOSIS — F33.0 MILD EPISODE OF RECURRENT MAJOR DEPRESSIVE DISORDER (HCC): Chronic | ICD-10-CM

## 2021-05-13 PROCEDURE — 99214 OFFICE O/P EST MOD 30 MIN: CPT | Performed by: NURSE PRACTITIONER

## 2021-05-13 RX ORDER — DEXTROAMPHETAMINE SACCHARATE, AMPHETAMINE ASPARTATE MONOHYDRATE, DEXTROAMPHETAMINE SULFATE AND AMPHETAMINE SULFATE 3.75; 3.75; 3.75; 3.75 MG/1; MG/1; MG/1; MG/1
15 CAPSULE, EXTENDED RELEASE ORAL EVERY MORNING
Qty: 7 CAPSULE | Refills: 0 | Status: SHIPPED | OUTPATIENT
Start: 2021-05-13 | End: 2021-05-24

## 2021-05-13 NOTE — PROGRESS NOTES
"This provider is located at the [facility] (through [facility]), [address], [facility], [address] using a secure [email or web address] Video Visit through [employer]. Patient is being seen remotely via telehealth at their home address in [place], and stated they are in a secure environment for this session. The patient's condition being diagnosed/treated is appropriate for telemedicine. The provider identified herself as well as her credentials. The patient, and/or patients guardian, consent to be seen remotely, and when consent is given they understand that the consent allows for patient identifiable information to be sent to a third party as needed. They may refuse to be seen remotely at any time. The electronic data is encrypted and password protected, and the patient and/or guardian has been advised of the potential risks to privacy not withstanding such measures.    You have chosen to receive care through a telehealth visit.  Do you consent to use a video/audio connection for your medical care today? Yes     Subjective   Frank Champion is a 35 y.o. male who is here today for medication management follow up.    Chief Complaint: Depression, anxiety, ADHD    History of Present Illness: The patient describes his mood as \"a little worse\" over the last few weeks.  Patient states that the increase in Adderall XR to 20 mg seems to have affected his mood. Patient states that he has felt more irritable and anxious since increasing the dose. Patient states that he tolerated the 15 mg better but endorses that he did not feel that it was only mildly effective for ADHD symptoms. Patient states that he would like to discuss other options for ADHD medications at today's visit due to intolerability of side effects at 20 mg and inefficacy at 15 mg.  The patient reports his appetite as good.  The patient reports his sleep as good.  The patient denies any nightmares or bad dreams.  The patient denies " any new medical problems or changes in medications since last appointment with this facility.  The patient reports compliance with current medication regimen.  The patient denies any current side effects from his current medication regimen.  The patient denies any abnormal muscle movements or tics. The patient rates his ADHD 5/10 on a 0-10 scale, with 10 being the worst.   The patient rates his depression at a 2/10 on a 0-10 scale, with 10 being the worst.  The patient rates his anxiety at a 6/10 on a 0-10 scale, with 10 being the worst.  Patient denies any panic attacks. Patient states that he noticed the increase in anxiety after beginning the increased dose of Adderall XR. Patient states that he has been very on edge and a little more irritable. Patient states that he is not pleased with his mood or control of ADHD symptoms with the Adderall. The patient rates hopelessness at a 0/10 on a 0-10 scale with 10 being the worst.  The patient would like to change his ADHD medication at this visit.  The patient denies suicidal ideations and homicidal ideations, and is convincing.  The patient denies any auditory hallucinations or visual hallucinations.  The patient does not endorse significant symptoms consistent with bipolar disorder or a psychotic illness.        Prior Psychiatric Medications:  Tenex 1 mg nightly. States that this medication caused excessive sedation and daytime drowsiness.   Strattera 40 mg daily. States this medications was effective for ADHD, but caused severe urinary and sexual side effects that he could not tolerate.  Reports that these side effects began 2-3 weeks after beginning Straterra and stopped about 1 week after discontinuing it.   Adderall XR 15 mg daily - only mildly effective for ADHD  Adderall XR 20 mg daily - significant increase in anxiety and irritability with no improvement in focus and concentration from the 15 mg   Concerta 36 and 54 mg daily - ineffective            The  following portions of the patient's history were reviewed and updated as appropriate: allergies, current medications, past family history, past medical history, past social history, past surgical history and problem list.    Review of Systems   Constitutional: Negative for activity change, appetite change, fatigue and unexpected weight change.   Psychiatric/Behavioral: Positive for decreased concentration. Negative for agitation, behavioral problems, confusion, dysphoric mood, hallucinations, self-injury, sleep disturbance and suicidal ideas. The patient is nervous/anxious. The patient is not hyperactive.        Objective   Physical Exam  Constitutional:       Appearance: Normal appearance.   Neurological:      Mental Status: He is alert.   Psychiatric:         Attention and Perception: Perception normal. He is inattentive.         Mood and Affect: Affect normal. Mood is anxious.         Speech: Speech normal.         Behavior: Behavior normal. Behavior is cooperative.         Thought Content: Thought content normal. Thought content does not include homicidal or suicidal ideation. Thought content does not include homicidal or suicidal plan.         Cognition and Memory: Cognition and memory normal.         Judgment: Judgment normal.       There were no vitals taken for this visit.    The patient was seen remotely today via a MyChart Video Visit through [facility].  Unable to obtain vital signs due to nature of remote visit.  Height stated at 71.5 inches.  Weight stated at 260 pounds.     Allergies   Allergen Reactions   • Guanfacine Other (See Comments)     sedation   • Strattera [Atomoxetine] Other (See Comments)     Erectile dysfunction, difficulty urinating, urinary incontinence   • Sulfa Antibiotics GI Intolerance       Recent Results (from the past 2016 hour(s))   CBC (No Diff)    Collection Time: 04/15/21  8:47 AM    Specimen: Blood   Result Value Ref Range    WBC 9.00 3.40 - 10.80 10*3/mm3    RBC 4.59 4.14 - 5.80  10*6/mm3    Hemoglobin 14.2 13.0 - 17.7 g/dL    Hematocrit 40.8 37.5 - 51.0 %    MCV 88.9 79.0 - 97.0 fL    MCH 30.9 26.6 - 33.0 pg    MCHC 34.8 31.5 - 35.7 g/dL    RDW 12.2 (L) 12.3 - 15.4 %    RDW-SD 39.4 37.0 - 54.0 fl    MPV 11.6 6.0 - 12.0 fL    Platelets 385 140 - 450 10*3/mm3   Comprehensive Metabolic Panel    Collection Time: 04/15/21  8:47 AM    Specimen: Blood   Result Value Ref Range    Glucose 89 65 - 99 mg/dL    BUN 21 (H) 6 - 20 mg/dL    Creatinine 0.95 0.76 - 1.27 mg/dL    Sodium 139 136 - 145 mmol/L    Potassium 5.4 (H) 3.5 - 5.2 mmol/L    Chloride 104 98 - 107 mmol/L    CO2 25.1 22.0 - 29.0 mmol/L    Calcium 9.8 8.6 - 10.5 mg/dL    Total Protein 8.0 6.0 - 8.5 g/dL    Albumin 4.80 3.50 - 5.20 g/dL    ALT (SGPT) 46 (H) 1 - 41 U/L    AST (SGOT) 25 1 - 40 U/L    Alkaline Phosphatase 58 39 - 117 U/L    Total Bilirubin 0.3 0.0 - 1.2 mg/dL    eGFR Non African Amer 91 >60 mL/min/1.73    Globulin 3.2 gm/dL    A/G Ratio 1.5 g/dL    BUN/Creatinine Ratio 22.1 7.0 - 25.0    Anion Gap 9.9 5.0 - 15.0 mmol/L   TSH Rfx On Abnormal To Free T4    Collection Time: 04/15/21  8:47 AM    Specimen: Blood   Result Value Ref Range    TSH 1.820 0.270 - 4.200 uIU/mL   Lipid Panel    Collection Time: 04/15/21  8:47 AM    Specimen: Blood   Result Value Ref Range    Total Cholesterol 190 0 - 200 mg/dL    Triglycerides 142 0 - 150 mg/dL    HDL Cholesterol 36 (L) 40 - 60 mg/dL    LDL Cholesterol  128 (H) 0 - 100 mg/dL    VLDL Cholesterol 26 5 - 40 mg/dL    LDL/HDL Ratio 3.49        Current Medications:   Current Outpatient Medications   Medication Sig Dispense Refill   • buPROPion XL (Wellbutrin XL) 300 MG 24 hr tablet Take 1 tablet by mouth Daily. 90 tablet 0   • lansoprazole (PREVACID) 15 MG capsule Take 15 mg by mouth Daily.     • losartan (COZAAR) 50 MG tablet Take 1 tablet by mouth Daily. 30 tablet 5   • testosterone 12.5 MG/ACT (1%) gel Place 1 Pump on the skin as directed by provider Every Morning. 75 g 2   •  amphetamine-dextroamphetamine XR (ADDERALL XR) 15 MG 24 hr capsule Take 1 capsule by mouth Every Morning 7 capsule 0   • lisdexamfetamine (VYVANSE) 30 MG capsule Take 1 capsule by mouth Every Morning 30 capsule 0     No current facility-administered medications for this visit.       Mental Status Exam:   Hygiene:   good  Cooperation:  Cooperative  Eye Contact:  Good  Psychomotor Behavior:  Appropriate  Affect:  Full range  Hopelessness: Denies  Speech:  Normal  Thought Process:  Goal directed  Thought Content:  Normal  Suicidal:  None  Homicidal:  None  Hallucinations:  None  Delusion:  None  Memory:  Intact  Orientation:  Person, Place, Time and Situation  Reliability:  good  Insight:  Good  Judgement:  Good  Impulse Control:  Good  Physical/Medical Issues:  Yes See medical history       Assessment/Plan   Diagnoses and all orders for this visit:    1. Attention deficit hyperactivity disorder (ADHD), predominantly inattentive type (Primary)  -     lisdexamfetamine (VYVANSE) 30 MG capsule; Take 1 capsule by mouth Every Morning  Dispense: 30 capsule; Refill: 0  -     amphetamine-dextroamphetamine XR (ADDERALL XR) 15 MG 24 hr capsule; Take 1 capsule by mouth Every Morning  Dispense: 7 capsule; Refill: 0    2. Generalized anxiety disorder    3. Mild episode of recurrent major depressive disorder (CMS/HCC)            Visit Diagnoses:    ICD-10-CM ICD-9-CM   1. Attention deficit hyperactivity disorder (ADHD), predominantly inattentive type  F90.0 314.00   2. Generalized anxiety disorder  F41.1 300.02   3. Mild episode of recurrent major depressive disorder (CMS/HCC)  F33.0 296.31       GOALS:  Short Term Goals: Patient will be compliant with medication, and patient will have no significant medication related side effects.  Patient will be engaged in psychotherapy as indicated.  Patient will report subjective improvement of symptoms.  Long term goals: To stabilize mood and treat/improve subjective symptoms, the patient  will stay out of the hospital, the patient will be at an optimal level of functioning, and the patient will take all medications as prescribed.  The patient verbalized understanding and agreement with goals that were mutually set.      TREATMENT PLAN/GOALS: Continue medications and treatment plan as indicated. Treatment and medication options discussed during today's visit. Patient acknowledged and verbally consented to continue with current treatment plan and was educated on the importance of compliance with treatment and follow-up appointments.      -Decrease Adderall XR to 15 mg daily until can begin Vyvanse 30 mg daily. Patient states that the 20 mg was intolerable but the 15 mg was only mildly effective for ADHD symptoms. Patient states that he has 20 mg remaining from previous precription, but states that he would like to decrease dose until he can begin Vyvanse as the Adderall XR 20 mg has been intolerable.  Patient states that he is fearful of exacerbation of ADHD symptoms and affecting work performance if he completely discontinues ADHD medication while waiting for Vyvanse to be approved by insurance. Discussed with patient that a 7-day supply of the Adderall XR 15 mg will be sent in while we are awaiting for his Vyvanse to be approved by the insurance and patient is agreeable to this. Instructed patient to discontinue Adderall XR once he begins Vyvanse and patient verbalizes understanding.   -Begin Vyvanse 30 mg daily once medication has been approved by insurance. Discussed with patient that this medication should be approved by his insurance due to trials and failures with multiple stimulant and nonstimulant medications for ADHD.  -Continue Wellbutrin  mg daily  -Continue psychotherapy      MEDICATION ISSUES: Discussed medication options and treatment plan of prescribed medication, any off label use of medication, as well as the risks, benefits, any black box warnings including increased  suicidality, and side effects including but not limited to potential falls, dizziness, possible impaired driving, GI side effects (change in appetite, abdominal discomfort, nausea, vomiting, diarrhea, and/or constipation), dry mouth, somnolence, sedation, insomnia, activation, agitation, irritation, tremors, abnormal muscle movements or disorders, headache, sweating, possible bruising or rare bleeding, electrolyte and/or fluid abnormalities, change in blood pressure/heart rate/and or heart rhythm, sexual dysfunction, and metabolic adversities among others. Patient and/or guardian agreeable to call the office with any worsening of symptoms or onset of side effects, or if any concerns or questions arise.  The contact information for the office is made available to the patient and/or guardian.  Patient and/or guardian agreeable to call 911 or go to the nearest ER should they begin having any SI/HI, or if any urgent concerns arise. No medication side effects or related complaints today.    The patient is being prescribed a controlled substance as part of the treatment plan. The patient/guardian has been educated of appropriate use of the medication(s), including risks and side effects such as insomnia, headache, exacerbation of tics, nervousness, irritability, overstimulation, tremor, dizziness, anorexia or change in appetite, nausea, dry mouth, constipation, diarrhea, weight loss, sexual dysfunction, psychotic episodes, seizures, palpitations, tachycardia, hypertension, rare activation (activation of hypomania, chelsie, and/or suicidal ideations), cardiovascular adverse effects including sudden death (especially patients with pre-existing structural abnormalities often associated with a family history of cardiac disease), may slow normal growth in children, weight gain is reported but not expected, sedation is possible but activation is much more common, metabolic adversities, and overdose among others. Patient/guardian  is also informed that the medication is to be used by the patient only, the medication is to be used only as directed, and the medication should not be combined with other substances unless directed by a Provider/Prescriber. The patient/guardian verbalized understanding and agreement with this in their own words, and wish to continue with current treatment plan.    Without the prescribed medication for ADHD, it is reported that the patient has problems with attention and focus including being easily distracted, easily losing objects, trouble with time management, trouble completing tasks because of distractions, procrastination, indecisiveness, careless mistakes in daily activities/work/school, and not finishing jobs that are started.  Patient denies any side effects, no worsening of insomnia, and no worsening of anxiety on the medication dose.  Due to the reported problems without the medication usage, as well as the significant improvement in symptoms with the medication usage, the patient requests to remain on the prescribed medication for ADHD.      MEDS ORDERED DURING VISIT:  New Medications Ordered This Visit   Medications   • lisdexamfetamine (VYVANSE) 30 MG capsule     Sig: Take 1 capsule by mouth Every Morning     Dispense:  30 capsule     Refill:  0   • amphetamine-dextroamphetamine XR (ADDERALL XR) 15 MG 24 hr capsule     Sig: Take 1 capsule by mouth Every Morning     Dispense:  7 capsule     Refill:  0       Return in about 4 weeks (around 6/10/2021), or if symptoms worsen or fail to improve, for Recheck.       Patient will follow-up in 4 weeks, highly encouraged the patient if he had any questions or concerns to contact the behavioral health virtual clinic for sooner appointment patient verbalized understanding.      Functional Status: Mild impairment     Prognosis: Fair with Ongoing Treatment             This document has been electronically signed by BETTY Sandy  May 13, 2021 16:47  EDT    Part of this note may be an electronic transcription/translation of spoken language to printed text using the Dragon Dictation System.

## 2021-05-14 ENCOUNTER — PRIOR AUTHORIZATION (OUTPATIENT)
Dept: PSYCHIATRY | Facility: CLINIC | Age: 36
End: 2021-05-14

## 2021-05-24 ENCOUNTER — OFFICE VISIT (OUTPATIENT)
Dept: FAMILY MEDICINE CLINIC | Facility: CLINIC | Age: 36
End: 2021-05-24

## 2021-05-24 ENCOUNTER — LAB (OUTPATIENT)
Dept: LAB | Facility: HOSPITAL | Age: 36
End: 2021-05-24

## 2021-05-24 VITALS — DIASTOLIC BLOOD PRESSURE: 88 MMHG | BODY MASS INDEX: 36.31 KG/M2 | WEIGHT: 264 LBS | SYSTOLIC BLOOD PRESSURE: 142 MMHG

## 2021-05-24 DIAGNOSIS — Z79.899 CONTROLLED SUBSTANCE AGREEMENT SIGNED: ICD-10-CM

## 2021-05-24 DIAGNOSIS — I10 ESSENTIAL HYPERTENSION: Primary | Chronic | ICD-10-CM

## 2021-05-24 DIAGNOSIS — R79.89 LOW TESTOSTERONE: Chronic | ICD-10-CM

## 2021-05-24 DIAGNOSIS — R06.83 SNORING: ICD-10-CM

## 2021-05-24 DIAGNOSIS — Z51.81 THERAPEUTIC DRUG MONITORING: ICD-10-CM

## 2021-05-24 LAB — TESTOST SERPL-MCNC: 211 NG/DL (ref 249–836)

## 2021-05-24 PROCEDURE — 36415 COLL VENOUS BLD VENIPUNCTURE: CPT

## 2021-05-24 PROCEDURE — 84403 ASSAY OF TOTAL TESTOSTERONE: CPT

## 2021-05-24 PROCEDURE — 99214 OFFICE O/P EST MOD 30 MIN: CPT | Performed by: FAMILY MEDICINE

## 2021-05-24 RX ORDER — LOSARTAN POTASSIUM 100 MG/1
100 TABLET ORAL DAILY
Qty: 30 TABLET | Refills: 2 | Status: SHIPPED | OUTPATIENT
Start: 2021-05-24 | End: 2021-07-09 | Stop reason: SDUPTHER

## 2021-05-24 RX ORDER — OMEPRAZOLE 20 MG/1
20 CAPSULE, DELAYED RELEASE ORAL DAILY
COMMUNITY
End: 2022-03-09

## 2021-05-24 NOTE — PROGRESS NOTES
Chief Complaint  Hypertension (follow up)    Subjective          Frank Champion presents to [facility]  History of Present Illness     No home blood pressure monitoring. Cough has gone away since switching lisinopril to losartan. He's not had good sleep. Waking up groggy but falling asleep and staying asleep ok    Using testosterone gel with rare missed doses. He has better energy and sex drive is better.     He tried CPAP last year. Snoring getting worse.     Objective   Vital Signs:   /88   Wt 120 kg (264 lb)   BMI 36.31 kg/m²     Physical Exam  Vitals reviewed.   Constitutional:       General: He is not in acute distress.     Appearance: He is obese. He is not ill-appearing.   Cardiovascular:      Rate and Rhythm: Normal rate and regular rhythm.   Pulmonary:      Effort: Pulmonary effort is normal.      Breath sounds: Normal breath sounds.   Neurological:      Mental Status: He is alert.        Result Review :                Urine Drug Screen - Urine, Clean Catch (12/23/2020)  CONTROLLED SUBSTANCE AGREEMENT - SCAN [facility] (07/21/2020)      Assessment and Plan    Diagnoses and all orders for this visit:    1. Essential hypertension (Primary)  -     losartan (COZAAR) 100 MG tablet; Take 1 tablet by mouth Daily.  Dispense: 30 tablet; Refill: 2  Uncontrolled.  Increase losartan to 100 mg.  Recheck next month.  2. Low testosterone  -     Testosterone; Future  Patient was switched from injection therapy to topical gel.  Symptomatically improving.  Check labs today.  The treatment plan includes a controlled substance.  Controlled Substance Medication Agreement signed and on file. Reviewed UDS.  Risks, benefits, and alternative treatments reviewed.  ROSA report 05/24/21 has been reviewed.     3. Controlled substance agreement signed    4. Snoring  Worsening.  Schedule follow-up visit with sleep clinic.      Follow Up   Return in about 1 month (around 6/24/2021) for  Office visit HTN .  Patient was given instructions and counseling regarding his condition or for health maintenance advice. Please see specific information pulled into the AVS if appropriate.     Electronically signed by Magaly De Jesus MD, 05/24/21, 8:59 AM EDT.

## 2021-05-24 NOTE — PATIENT INSTRUCTIONS
Megha Varela, BETTY Nurse Practitioner Nurse Practitioner 05/24/2021 End  5/24/21   Phone: [phone]; Fax: [phone]      Address: [facility] [address] [place] [address]

## 2021-06-04 ENCOUNTER — OFFICE VISIT (OUTPATIENT)
Dept: FAMILY MEDICINE CLINIC | Facility: CLINIC | Age: 36
End: 2021-06-04

## 2021-06-04 VITALS
TEMPERATURE: 97.1 F | SYSTOLIC BLOOD PRESSURE: 120 MMHG | DIASTOLIC BLOOD PRESSURE: 80 MMHG | HEIGHT: 72 IN | BODY MASS INDEX: 34.95 KG/M2 | HEART RATE: 89 BPM | OXYGEN SATURATION: 98 % | WEIGHT: 258 LBS

## 2021-06-04 DIAGNOSIS — I10 ESSENTIAL HYPERTENSION: Primary | Chronic | ICD-10-CM

## 2021-06-04 DIAGNOSIS — R79.89 LOW TESTOSTERONE: Chronic | ICD-10-CM

## 2021-06-04 PROCEDURE — 99214 OFFICE O/P EST MOD 30 MIN: CPT | Performed by: FAMILY MEDICINE

## 2021-06-04 RX ORDER — AMLODIPINE BESYLATE 2.5 MG/1
2.5 TABLET ORAL NIGHTLY
Qty: 30 TABLET | Refills: 1 | Status: SHIPPED | OUTPATIENT
Start: 2021-06-04 | End: 2021-07-09 | Stop reason: SDUPTHER

## 2021-06-04 NOTE — PROGRESS NOTES
"Chief Complaint  Hypertension (1 month follow up, he reports that he checks his BP daily. He states that it tends to elevated in the aftenoons and he will still experience throbbing headaches and mild vision changes/ dizziness. He states that the highes he has noticed while check his BP is 150/90. He would like to discuss further with PCP)    Subjective          Frank Champion presents to [facility]  Hypertension  This is a chronic problem. The problem is uncontrolled. Associated symptoms include blurred vision and headaches. (Dizziness) Past treatments include ACE inhibitors. Current antihypertension treatment includes angiotensin blockers. The current treatment provides mild improvement.      Past week headaches and facial flushing in the evenings. Highest 150/90. Around 6pm 136//80s.     He recently increased testosterone to 2 pumps. He missed dose this morning.         Objective   Vital Signs:   /80   Pulse 89   Temp 97.1 °F (36.2 °C)   Ht 181.6 cm (71.5\")   Wt 117 kg (258 lb)   SpO2 98%   BMI 35.48 kg/m²     Physical Exam  Vitals reviewed.   Constitutional:       General: He is not in acute distress.     Appearance: He is obese. He is not ill-appearing.   Cardiovascular:      Rate and Rhythm: Normal rate and regular rhythm.   Pulmonary:      Effort: Pulmonary effort is normal.      Breath sounds: Normal breath sounds.   Neurological:      Mental Status: He is alert.        Result Review :                 Assessment and Plan    Diagnoses and all orders for this visit:    1. Essential hypertension (Primary)  -     amLODIPine (Norvasc) 2.5 MG tablet; Take 1 tablet by mouth Every Night.  Dispense: 30 tablet; Refill: 1  Uncontrolled.  Continues losartan 100 mg.  Discussed diuretics and calcium channel blockers and at this time decided to start amlodipine 2.5 mg.  Monitor for side effects.  Increase blood pressure does correlate to that the time testosterone was " increased possible medication side effect.  2. Low testosterone  Recent testosterone increase to 2 pumps daily.  Change in testosterone prescribing could correlate with increased blood pressure readings.  Reassess next month with labs after a month of increased testosterone therapy.      Follow Up   Return in about 1 month (around 7/4/2021) for Office visit HTN, low testosterone labs.  Patient was given instructions and counseling regarding his condition or for health maintenance advice. Please see specific information pulled into the AVS if appropriate.     Electronically signed by Magaly De Jesus MD, 06/04/21, 11:18 AM EDT.

## 2021-06-08 ENCOUNTER — TELEMEDICINE (OUTPATIENT)
Dept: PSYCHIATRY | Facility: CLINIC | Age: 36
End: 2021-06-08

## 2021-06-08 DIAGNOSIS — F41.1 GENERALIZED ANXIETY DISORDER: Chronic | ICD-10-CM

## 2021-06-08 DIAGNOSIS — F33.0 MILD EPISODE OF RECURRENT MAJOR DEPRESSIVE DISORDER (HCC): Chronic | ICD-10-CM

## 2021-06-08 DIAGNOSIS — F90.0 ATTENTION DEFICIT HYPERACTIVITY DISORDER (ADHD), PREDOMINANTLY INATTENTIVE TYPE: Primary | Chronic | ICD-10-CM

## 2021-06-08 PROCEDURE — 99214 OFFICE O/P EST MOD 30 MIN: CPT | Performed by: NURSE PRACTITIONER

## 2021-06-08 NOTE — PROGRESS NOTES
"This provider is located at the [facility] (through [facility]), [address], [place], [address] using a secure [employer] Video Visit through [employer]. Patient is being seen remotely via telehealth at their home address in [place], and stated they are in a secure environment for this session. The patient's condition being diagnosed/treated is appropriate for telemedicine. The provider identified herself as well as her credentials. The patient, and/or patients guardian, consent to be seen remotely, and when consent is given they understand that the consent allows for patient identifiable information to be sent to a third party as needed. They may refuse to be seen remotely at any time. The electronic data is encrypted and password protected, and the patient and/or guardian has been advised of the potential risks to privacy not withstanding such measures.    You have chosen to receive care through a telehealth visit.  Do you consent to use a video/audio connection for your medical care today? Yes     Subjective   Frank Champion is a 35 y.o. male who is here today for medication management follow up.    Chief Complaint: Depression, anxiety, ADHD    History of Present Illness: The patient describes his mood as \"good\" over the last few weeks.  Patient states that he has been doing well over the past few weeks.  Patient states that his depression and anxiety are much more well controlled now.  Patient states that the Vyvanse has been much better tolerated than the Adderall.  Patient states that he is not having any increases in anxiety or irritability that he was with the Adderall.  Patient endorses that the Vyvanse does not seem as effective for ADHD symptoms as the Adderall.  Patient states that he has had an exacerbation of ADHD symptoms since switching to Vyvanse.  Patient states that he thinks that the increased dose of the Vyvanse will be effective for maintenance of any " she symptoms.  Patient endorses that he has been following with PCP for hypertension.  Patient states that after his PCP increased dose of testosterone he experienced an increase in blood pressure.  Patient states that PCP has evaluated him and endorses that she concluded that the increased dose of testosterone was causing his blood pressure increase.  Patient states he was started on a new blood pressure medication recently and states that blood pressures been well controlled since that time.  Patient states he is checking his blood pressure daily and it has been 120s/80s since beginning the new blood pressure medication.  Encouraged patient to monitor blood pressure after beginning the increased dose of Vyvanse.  Encouraged patient to notify provider if he experiences an increase in blood pressure after beginning this medication and educated patient regarding potential effects on cardiovascular system with stimulant medications and patient verbalizes understanding.  Patient states that thus far he has tolerated similar medications well and they have not affected his blood pressure.  Patient endorses that the only time he has seen a change in blood pressure was after beginning the increased dose of testosterone.  The patient reports his appetite as good.  The patient reports his sleep as good.  The patient denies any nightmares or bad dreams.  The patient reports compliance with current medication regimen.  The patient denies any current side effects from his current medication regimen.  The patient denies any abnormal muscle movements or tics. The patient rates his ADHD 6-7/10 on a 0-10 scale, with 10 being the worst.   The patient rates his depression at a 2/10 on a 0-10 scale, with 10 being the worst.  The patient rates his anxiety at a 2/10 on a 0-10 scale, with 10 being the worst.  The patient rates hopelessness at a 0/10 on a 0-10 scale with 10 being the worst.  The patient would like to increase his ADHD  medication at this visit.  The patient denies suicidal ideations and homicidal ideations, and is convincing.  The patient denies any auditory hallucinations or visual hallucinations.  The patient does not endorse significant symptoms consistent with bipolar disorder or a psychotic illness.        Depression and anxiety more controlled  Vyvanse better for anxiety and irritability but not as effective for ADHD - some exacerbation of these symptoms      Blood pressure had been up recently - started new BP med and been better sicne then   120/80 last checked   Was 135-140/90s before     PCP thinks the increase was from recent increase in testosterone that just had        Prior Psychiatric Medications:  Tenex 1 mg nightly. States that this medication caused excessive sedation and daytime drowsiness.   Strattera 40 mg daily. States this medications was effective for ADHD, but caused severe urinary and sexual side effects that he could not tolerate.  Reports that these side effects began 2-3 weeks after beginning Straterra and stopped about 1 week after discontinuing it.   Adderall XR 15 mg daily - only mildly effective for ADHD  Adderall XR 20 mg daily - significant increase in anxiety and irritability with no improvement in focus and concentration from the 15 mg   Concerta 36 and 54 mg daily - ineffective        The following portions of the patient's history were reviewed and updated as appropriate: allergies, current medications, past family history, past medical history, past social history, past surgical history and problem list.    Review of Systems   Constitutional: Negative for activity change, appetite change, fatigue and unexpected weight change.   Psychiatric/Behavioral: Positive for decreased concentration. Negative for agitation, behavioral problems, confusion, dysphoric mood, hallucinations, self-injury, sleep disturbance and suicidal ideas. The patient is nervous/anxious. The patient is not hyperactive.            Objective   Physical Exam  Constitutional:       Appearance: Normal appearance.   Neurological:      Mental Status: He is alert.   Psychiatric:         Attention and Perception: Perception normal. He is inattentive.         Mood and Affect: Affect normal. Mood is anxious.         Speech: Speech normal.         Behavior: Behavior is hyperactive. Behavior is cooperative.         Thought Content: Thought content normal. Thought content does not include homicidal or suicidal ideation. Thought content does not include homicidal or suicidal plan.         Cognition and Memory: Cognition and memory normal.         Judgment: Judgment normal.       There were no vitals taken for this visit.    The patient was seen remotely today via a MyChart Video Visit through [facility].  Unable to obtain vital signs due to nature of remote visit.  Height stated at 71.5 inches.  Weight stated at 260 pounds.     Allergies   Allergen Reactions   • Guanfacine Other (See Comments)     sedation   • Strattera [Atomoxetine] Other (See Comments)     Erectile dysfunction, difficulty urinating, urinary incontinence   • Sulfa Antibiotics GI Intolerance       Recent Results (from the past 2016 hour(s))   CBC (No Diff)    Collection Time: 04/15/21  8:47 AM    Specimen: Blood   Result Value Ref Range    WBC 9.00 3.40 - 10.80 10*3/mm3    RBC 4.59 4.14 - 5.80 10*6/mm3    Hemoglobin 14.2 13.0 - 17.7 g/dL    Hematocrit 40.8 37.5 - 51.0 %    MCV 88.9 79.0 - 97.0 fL    MCH 30.9 26.6 - 33.0 pg    MCHC 34.8 31.5 - 35.7 g/dL    RDW 12.2 (L) 12.3 - 15.4 %    RDW-SD 39.4 37.0 - 54.0 fl    MPV 11.6 6.0 - 12.0 fL    Platelets 385 140 - 450 10*3/mm3   Comprehensive Metabolic Panel    Collection Time: 04/15/21  8:47 AM    Specimen: Blood   Result Value Ref Range    Glucose 89 65 - 99 mg/dL    BUN 21 (H) 6 - 20 mg/dL    Creatinine 0.95 0.76 - 1.27 mg/dL    Sodium 139 136 - 145 mmol/L    Potassium 5.4 (H) 3.5 - 5.2 mmol/L    Chloride 104 98 - 107 mmol/L    CO2 25.1 22.0  - 29.0 mmol/L    Calcium 9.8 8.6 - 10.5 mg/dL    Total Protein 8.0 6.0 - 8.5 g/dL    Albumin 4.80 3.50 - 5.20 g/dL    ALT (SGPT) 46 (H) 1 - 41 U/L    AST (SGOT) 25 1 - 40 U/L    Alkaline Phosphatase 58 39 - 117 U/L    Total Bilirubin 0.3 0.0 - 1.2 mg/dL    eGFR Non African Amer 91 >60 mL/min/1.73    Globulin 3.2 gm/dL    A/G Ratio 1.5 g/dL    BUN/Creatinine Ratio 22.1 7.0 - 25.0    Anion Gap 9.9 5.0 - 15.0 mmol/L   TSH Rfx On Abnormal To Free T4    Collection Time: 04/15/21  8:47 AM    Specimen: Blood   Result Value Ref Range    TSH 1.820 0.270 - 4.200 uIU/mL   Lipid Panel    Collection Time: 04/15/21  8:47 AM    Specimen: Blood   Result Value Ref Range    Total Cholesterol 190 0 - 200 mg/dL    Triglycerides 142 0 - 150 mg/dL    HDL Cholesterol 36 (L) 40 - 60 mg/dL    LDL Cholesterol  128 (H) 0 - 100 mg/dL    VLDL Cholesterol 26 5 - 40 mg/dL    LDL/HDL Ratio 3.49    Testosterone    Collection Time: 05/24/21  8:31 AM    Specimen: Blood   Result Value Ref Range    Testosterone, Total 211.00 (L) 249.00 - 836.00 ng/dL       Current Medications:   Current Outpatient Medications   Medication Sig Dispense Refill   • amLODIPine (Norvasc) 2.5 MG tablet Take 1 tablet by mouth Every Night. 30 tablet 1   • buPROPion XL (Wellbutrin XL) 300 MG 24 hr tablet Take 1 tablet by mouth Daily. 90 tablet 0   • lisdexamfetamine (VYVANSE) 40 MG capsule Take 1 capsule by mouth Every Morning 30 capsule 0   • losartan (COZAAR) 100 MG tablet Take 1 tablet by mouth Daily. 30 tablet 2   • omeprazole (priLOSEC) 20 MG capsule Take 20 mg by mouth Daily.     • testosterone 12.5 MG/ACT (1%) gel Place 1 Pump on the skin as directed by provider Every Morning. 75 g 2     No current facility-administered medications for this visit.       Mental Status Exam:   Hygiene:   good  Cooperation:  Cooperative  Eye Contact:  Good  Psychomotor Behavior:  Appropriate  Affect:  Full range  Hopelessness: Denies  Speech:  Normal  Thought Process:  Goal  directed  Thought Content:  Normal  Suicidal:  None  Homicidal:  None  Hallucinations:  None  Delusion:  None  Memory:  Intact  Orientation:  Person, Place, Time and Situation  Reliability:  good  Insight:  Good  Judgement:  Good  Impulse Control:  Good  Physical/Medical Issues:  Yes See medical history       Assessment/Plan   Diagnoses and all orders for this visit:    1. Attention deficit hyperactivity disorder (ADHD), predominantly inattentive type (Primary)  -     lisdexamfetamine (VYVANSE) 40 MG capsule; Take 1 capsule by mouth Every Morning  Dispense: 30 capsule; Refill: 0    2. Mild episode of recurrent major depressive disorder (CMS/HCC)    3. Generalized anxiety disorder            Visit Diagnoses:    ICD-10-CM ICD-9-CM   1. Attention deficit hyperactivity disorder (ADHD), predominantly inattentive type  F90.0 314.00   2. Mild episode of recurrent major depressive disorder (CMS/HCC)  F33.0 296.31   3. Generalized anxiety disorder  F41.1 300.02       GOALS:  Short Term Goals: Patient will be compliant with medication, and patient will have no significant medication related side effects.  Patient will be engaged in psychotherapy as indicated.  Patient will report subjective improvement of symptoms.  Long term goals: To stabilize mood and treat/improve subjective symptoms, the patient will stay out of the hospital, the patient will be at an optimal level of functioning, and the patient will take all medications as prescribed.  The patient verbalized understanding and agreement with goals that were mutually set.      TREATMENT PLAN/GOALS: Continue medications and treatment plan as indicated. Treatment and medication options discussed during today's visit. Patient acknowledged and verbally consented to continue with current treatment plan and was educated on the importance of compliance with treatment and follow-up appointments.      -Increase Vyvanse to 40 mg daily  -Continue Wellbutrin  mg  daily  -Continue psychotherapy      MEDICATION ISSUES: Discussed medication options and treatment plan of prescribed medication, any off label use of medication, as well as the risks, benefits, any black box warnings including increased suicidality, and side effects including but not limited to potential falls, dizziness, possible impaired driving, GI side effects (change in appetite, abdominal discomfort, nausea, vomiting, diarrhea, and/or constipation), dry mouth, somnolence, sedation, insomnia, activation, agitation, irritation, tremors, abnormal muscle movements or disorders, headache, sweating, possible bruising or rare bleeding, electrolyte and/or fluid abnormalities, change in blood pressure/heart rate/and or heart rhythm, sexual dysfunction, and metabolic adversities among others. Patient and/or guardian agreeable to call the office with any worsening of symptoms or onset of side effects, or if any concerns or questions arise.  The contact information for the office is made available to the patient and/or guardian.  Patient and/or guardian agreeable to call 911 or go to the nearest ER should they begin having any SI/HI, or if any urgent concerns arise. No medication side effects or related complaints today.    The patient is being prescribed a controlled substance as part of the treatment plan. The patient/guardian has been educated of appropriate use of the medication(s), including risks and side effects such as insomnia, headache, exacerbation of tics, nervousness, irritability, overstimulation, tremor, dizziness, anorexia or change in appetite, nausea, dry mouth, constipation, diarrhea, weight loss, sexual dysfunction, psychotic episodes, seizures, palpitations, tachycardia, hypertension, rare activation (activation of hypomania, chelsie, and/or suicidal ideations), cardiovascular adverse effects including sudden death (especially patients with pre-existing structural abnormalities often associated with a  family history of cardiac disease), may slow normal growth in children, weight gain is reported but not expected, sedation is possible but activation is much more common, metabolic adversities, and overdose among others. Patient/guardian is also informed that the medication is to be used by the patient only, the medication is to be used only as directed, and the medication should not be combined with other substances unless directed by a Provider/Prescriber. The patient/guardian verbalized understanding and agreement with this in their own words, and wish to continue with current treatment plan.    Without the prescribed medication for ADHD, it is reported that the patient has problems with attention and focus including being easily distracted, easily losing objects, trouble with time management, trouble completing tasks because of distractions, procrastination, indecisiveness, careless mistakes in daily activities/work/school, and not finishing jobs that are started.  Patient denies any side effects, no worsening of insomnia, and no worsening of anxiety on the medication dose.  Due to the reported problems without the medication usage, as well as the significant improvement in symptoms with the medication usage, the patient requests to remain on the prescribed medication for ADHD.      MEDS ORDERED DURING VISIT:  New Medications Ordered This Visit   Medications   • lisdexamfetamine (VYVANSE) 40 MG capsule     Sig: Take 1 capsule by mouth Every Morning     Dispense:  30 capsule     Refill:  0       Return in about 4 weeks (around 7/6/2021), or if symptoms worsen or fail to improve, for Recheck.       Patient will follow-up in 4 weeks, highly encouraged the patient if he had any questions or concerns to contact the behavioral health [facility] clinic for sooner appointment patient verbalized understanding.      Functional Status: Mild impairment     Prognosis: Fair with Ongoing Treatment             This document has  been electronically signed by BETTY Sandy  June 8, 2021 16:14 EDT    Part of this note may be an electronic transcription/translation of spoken language to printed text using the Dragon Dictation System.

## 2021-07-07 ENCOUNTER — TELEMEDICINE (OUTPATIENT)
Dept: PSYCHIATRY | Facility: CLINIC | Age: 36
End: 2021-07-07

## 2021-07-07 DIAGNOSIS — F33.0 MILD EPISODE OF RECURRENT MAJOR DEPRESSIVE DISORDER (HCC): Chronic | ICD-10-CM

## 2021-07-07 DIAGNOSIS — F90.0 ATTENTION DEFICIT HYPERACTIVITY DISORDER (ADHD), PREDOMINANTLY INATTENTIVE TYPE: Primary | Chronic | ICD-10-CM

## 2021-07-07 DIAGNOSIS — F41.1 GENERALIZED ANXIETY DISORDER: Chronic | ICD-10-CM

## 2021-07-07 PROCEDURE — 99214 OFFICE O/P EST MOD 30 MIN: CPT | Performed by: NURSE PRACTITIONER

## 2021-07-07 RX ORDER — BUPROPION HYDROCHLORIDE 300 MG/1
300 TABLET ORAL DAILY
Qty: 90 TABLET | Refills: 0 | Status: SHIPPED | OUTPATIENT
Start: 2021-07-07 | End: 2021-10-05 | Stop reason: SDUPTHER

## 2021-07-07 NOTE — PROGRESS NOTES
"This provider is located at the [facility] (through [facility]), [address], [facility], [address] using a secure [employer] Video Visit through [employer]. Patient is being seen remotely via telehealth at their home address in [place], and stated they are in a secure environment for this session. The patient's condition being diagnosed/treated is appropriate for telemedicine. The provider identified herself as well as her credentials. The patient, and/or patients guardian, consent to be seen remotely, and when consent is given they understand that the consent allows for patient identifiable information to be sent to a third party as needed. They may refuse to be seen remotely at any time. The electronic data is encrypted and password protected, and the patient and/or guardian has been advised of the potential risks to privacy not withstanding such measures.    You have chosen to receive care through a telehealth visit.  Do you consent to use a video/audio connection for your medical care today? Yes     Subjective   Frank Champion is a 35 y.o. male who is here today for medication management follow up.    Chief Complaint: Depression, anxiety, ADHD    History of Present Illness: The patient describes his mood as \"good\" over the last few weeks.  Patient states that he has been been doing well recently.  Patient states that he and his wife both noticed a reduction in ADHD symptoms with increased dose of Vyvanse.  Patient endorses that he is still having difficulty with focus and concentration although the symptoms have not been as severe as they were.  Patient endorses that he thinks an increased dose of the Vyvanse would be helpful for the symptoms.  Patient endorses that he is tolerating the Vyvanse very well and has not had any side effects of increased anxiety or irritability like he was having with Adderall.  Patient endorses that he has a new job that he will be starting " next month as well.  Patient states that he will be the  over the [employer] department at [facility] [facility] [employer].  Patient endorses that his blood pressure has been very well controlled since he started a new blood pressure medication.  The patient reports his appetite as good.  The patient reports his sleep as good.  The patient denies any nightmares or bad dreams.  The patient denies any new medical problems or changes in medications since last appointment with this facility.  The patient reports compliance with current medication regimen.  The patient denies any current side effects from his current medication regimen.  The patient denies any abnormal muscle movements or tics. The patient rates his ADHD 5-6/10 on a 0-10 scale, with 10 being the worst.   The patient rates his depression at a 1-2/10 on a 0-10 scale, with 10 being the worst.  The patient rates his anxiety at a 3-4/10 on a 0-10 scale, with 10 being the worst. Patient states that anxiety has been somewhat increased lately due to moving, but states that it is still manageable and tolerable for him.  The patient rates hopelessness at a 0/10 on a 0-10 scale with 10 being the worst.  The patient would like to increase his medications at this visit.  The patient denies suicidal ideations and homicidal ideations, and is convincing.  The patient denies any auditory hallucinations or visual hallucinations.  The patient does not endorse significant symptoms consistent with bipolar disorder or a psychotic illness.            Prior Psychiatric Medications:  Tenex 1 mg nightly. States that this medication caused excessive sedation and daytime drowsiness.   Strattera 40 mg daily. States this medications was effective for ADHD, but caused severe urinary and sexual side effects that he could not tolerate.  Reports that these side effects began 2-3 weeks after beginning Straterra and stopped about 1 week after discontinuing it.   Adderall XR 15 mg daily - only  mildly effective for ADHD  Adderall XR 20 mg daily - significant increase in anxiety and irritability with no improvement in focus and concentration from the 15 mg   Concerta 36 and 54 mg daily - ineffective        The following portions of the patient's history were reviewed and updated as appropriate: allergies, current medications, past family history, past medical history, past social history, past surgical history and problem list.    Review of Systems   Constitutional: Negative for activity change, appetite change, fatigue and unexpected weight change.   Psychiatric/Behavioral: Positive for decreased concentration. Negative for agitation, behavioral problems, confusion, dysphoric mood, hallucinations, self-injury, sleep disturbance and suicidal ideas. The patient is nervous/anxious. The patient is not hyperactive.        Objective   Physical Exam  Constitutional:       Appearance: Normal appearance.   Neurological:      Mental Status: He is alert.   Psychiatric:         Attention and Perception: Perception normal. He is inattentive.         Mood and Affect: Affect normal. Mood is anxious.         Speech: Speech normal.         Behavior: Behavior normal. Behavior is cooperative.         Thought Content: Thought content normal. Thought content does not include homicidal or suicidal ideation. Thought content does not include homicidal or suicidal plan.         Cognition and Memory: Cognition and memory normal.         Judgment: Judgment normal.       There were no vitals taken for this visit.    The patient was seen remotely today via a MyChart Video Visit through [facility].  Unable to obtain vital signs due to nature of remote visit.  Height stated at 71.5 inches.  Weight stated at 258 pounds.     Allergies   Allergen Reactions   • Guanfacine Other (See Comments)     sedation   • Strattera [Atomoxetine] Other (See Comments)     Erectile dysfunction, difficulty urinating, urinary incontinence   • Sulfa Antibiotics GI  Intolerance       Recent Results (from the past 2016 hour(s))   CBC (No Diff)    Collection Time: 04/15/21  8:47 AM    Specimen: Blood   Result Value Ref Range    WBC 9.00 3.40 - 10.80 10*3/mm3    RBC 4.59 4.14 - 5.80 10*6/mm3    Hemoglobin 14.2 13.0 - 17.7 g/dL    Hematocrit 40.8 37.5 - 51.0 %    MCV 88.9 79.0 - 97.0 fL    MCH 30.9 26.6 - 33.0 pg    MCHC 34.8 31.5 - 35.7 g/dL    RDW 12.2 (L) 12.3 - 15.4 %    RDW-SD 39.4 37.0 - 54.0 fl    MPV 11.6 6.0 - 12.0 fL    Platelets 385 140 - 450 10*3/mm3   Comprehensive Metabolic Panel    Collection Time: 04/15/21  8:47 AM    Specimen: Blood   Result Value Ref Range    Glucose 89 65 - 99 mg/dL    BUN 21 (H) 6 - 20 mg/dL    Creatinine 0.95 0.76 - 1.27 mg/dL    Sodium 139 136 - 145 mmol/L    Potassium 5.4 (H) 3.5 - 5.2 mmol/L    Chloride 104 98 - 107 mmol/L    CO2 25.1 22.0 - 29.0 mmol/L    Calcium 9.8 8.6 - 10.5 mg/dL    Total Protein 8.0 6.0 - 8.5 g/dL    Albumin 4.80 3.50 - 5.20 g/dL    ALT (SGPT) 46 (H) 1 - 41 U/L    AST (SGOT) 25 1 - 40 U/L    Alkaline Phosphatase 58 39 - 117 U/L    Total Bilirubin 0.3 0.0 - 1.2 mg/dL    eGFR Non African Amer 91 >60 mL/min/1.73    Globulin 3.2 gm/dL    A/G Ratio 1.5 g/dL    BUN/Creatinine Ratio 22.1 7.0 - 25.0    Anion Gap 9.9 5.0 - 15.0 mmol/L   TSH Rfx On Abnormal To Free T4    Collection Time: 04/15/21  8:47 AM    Specimen: Blood   Result Value Ref Range    TSH 1.820 0.270 - 4.200 uIU/mL   Lipid Panel    Collection Time: 04/15/21  8:47 AM    Specimen: Blood   Result Value Ref Range    Total Cholesterol 190 0 - 200 mg/dL    Triglycerides 142 0 - 150 mg/dL    HDL Cholesterol 36 (L) 40 - 60 mg/dL    LDL Cholesterol  128 (H) 0 - 100 mg/dL    VLDL Cholesterol 26 5 - 40 mg/dL    LDL/HDL Ratio 3.49    Testosterone    Collection Time: 05/24/21  8:31 AM    Specimen: Blood   Result Value Ref Range    Testosterone, Total 211.00 (L) 249.00 - 836.00 ng/dL       Current Medications:   Current Outpatient Medications   Medication Sig Dispense Refill    • amLODIPine (Norvasc) 2.5 MG tablet Take 1 tablet by mouth Every Night. 30 tablet 1   • buPROPion XL (Wellbutrin XL) 300 MG 24 hr tablet Take 1 tablet by mouth Daily. 90 tablet 0   • lisdexamfetamine (VYVANSE) 50 MG capsule Take 1 capsule by mouth Every Morning 30 capsule 0   • losartan (COZAAR) 100 MG tablet Take 1 tablet by mouth Daily. 30 tablet 2   • omeprazole (priLOSEC) 20 MG capsule Take 20 mg by mouth Daily.     • testosterone 12.5 MG/ACT (1%) gel Place 1 Pump on the skin as directed by provider Every Morning. 75 g 2     No current facility-administered medications for this visit.       Mental Status Exam:   Hygiene:   good  Cooperation:  Cooperative  Eye Contact:  Good  Psychomotor Behavior:  Appropriate  Affect:  Full range  Hopelessness: Denies  Speech:  Normal  Thought Process:  Goal directed  Thought Content:  Normal  Suicidal:  None  Homicidal:  None  Hallucinations:  None  Delusion:  None  Memory:  Intact  Orientation:  Person, Place, Time and Situation  Reliability:  good  Insight:  Good  Judgement:  Good  Impulse Control:  Good  Physical/Medical Issues:  Yes See medical history       The ROSA report, request number [id number], of the past 12 months were reviewed and is appropriate.  The patient/guardian reports taking the medication only as prescribed.  The patient/guardian denies any abuse or misuse of the medication.  The patient/guardian denies any other substance use or issues.  There are no apparent substance related issues.  The patient reports no side effects of the current medication usage.  The patient/guardian has reported significant improvement with medication usage and wishes to continue medication as prescribed.  The patient/guardian is appropriate to continue with current medication usage at this time.  Reinforced risks and side effects of medication usage, patient and/or guardian verbalize understanding in their own words and are in agreement with current  plan.      Assessment/Plan   Diagnoses and all orders for this visit:    1. Attention deficit hyperactivity disorder (ADHD), predominantly inattentive type (Primary)  -     lisdexamfetamine (VYVANSE) 50 MG capsule; Take 1 capsule by mouth Every Morning  Dispense: 30 capsule; Refill: 0    2. Mild episode of recurrent major depressive disorder (CMS/HCC)  -     buPROPion XL (Wellbutrin XL) 300 MG 24 hr tablet; Take 1 tablet by mouth Daily.  Dispense: 90 tablet; Refill: 0    3. Generalized anxiety disorder  -     buPROPion XL (Wellbutrin XL) 300 MG 24 hr tablet; Take 1 tablet by mouth Daily.  Dispense: 90 tablet; Refill: 0            Visit Diagnoses:    ICD-10-CM ICD-9-CM   1. Attention deficit hyperactivity disorder (ADHD), predominantly inattentive type  F90.0 314.00   2. Mild episode of recurrent major depressive disorder (CMS/HCC)  F33.0 296.31   3. Generalized anxiety disorder  F41.1 300.02       GOALS:  Short Term Goals: Patient will be compliant with medication, and patient will have no significant medication related side effects.  Patient will be engaged in psychotherapy as indicated.  Patient will report subjective improvement of symptoms.  Long term goals: To stabilize mood and treat/improve subjective symptoms, the patient will stay out of the hospital, the patient will be at an optimal level of functioning, and the patient will take all medications as prescribed.  The patient verbalized understanding and agreement with goals that were mutually set.      TREATMENT PLAN/GOALS: Continue medications and treatment plan as indicated. Treatment and medication options discussed during today's visit. Patient acknowledged and verbally consented to continue with current treatment plan and was educated on the importance of compliance with treatment and follow-up appointments.      -Increase Vyvanse to 50 mg daily  -Continue Wellbutrin  mg daily  -Continue psychotherapy      MEDICATION ISSUES: Discussed medication  options and treatment plan of prescribed medication, any off label use of medication, as well as the risks, benefits, any black box warnings including increased suicidality, and side effects including but not limited to potential falls, dizziness, possible impaired driving, GI side effects (change in appetite, abdominal discomfort, nausea, vomiting, diarrhea, and/or constipation), dry mouth, somnolence, sedation, insomnia, activation, agitation, irritation, tremors, abnormal muscle movements or disorders, headache, sweating, possible bruising or rare bleeding, electrolyte and/or fluid abnormalities, change in blood pressure/heart rate/and or heart rhythm, sexual dysfunction, and metabolic adversities among others. Patient and/or guardian agreeable to call the office with any worsening of symptoms or onset of side effects, or if any concerns or questions arise.  The contact information for the office is made available to the patient and/or guardian.  Patient and/or guardian agreeable to call 911 or go to the nearest ER should they begin having any SI/HI, or if any urgent concerns arise. No medication side effects or related complaints today.    The patient is being prescribed a controlled substance as part of the treatment plan. The patient/guardian has been educated of appropriate use of the medication(s), including risks and side effects such as insomnia, headache, exacerbation of tics, nervousness, irritability, overstimulation, tremor, dizziness, anorexia or change in appetite, nausea, dry mouth, constipation, diarrhea, weight loss, sexual dysfunction, psychotic episodes, seizures, palpitations, tachycardia, hypertension, rare activation (activation of hypomania, chelsie, and/or suicidal ideations), cardiovascular adverse effects including sudden death (especially patients with pre-existing structural abnormalities often associated with a family history of cardiac disease), may slow normal growth in children,  weight gain is reported but not expected, sedation is possible but activation is much more common, metabolic adversities, and overdose among others. Patient/guardian is also informed that the medication is to be used by the patient only, the medication is to be used only as directed, and the medication should not be combined with other substances unless directed by a Provider/Prescriber. The patient/guardian verbalized understanding and agreement with this in their own words, and wish to continue with current treatment plan.    Without the prescribed medication for ADHD, it is reported that the patient has problems with attention and focus including being easily distracted, easily losing objects, trouble with time management, trouble completing tasks because of distractions, procrastination, indecisiveness, careless mistakes in daily activities/work/school, and not finishing jobs that are started.  Patient denies any side effects, no worsening of insomnia, and no worsening of anxiety on the medication dose.  Due to the reported problems without the medication usage, as well as the significant improvement in symptoms with the medication usage, the patient requests to remain on the prescribed medication for ADHD.      MEDS ORDERED DURING VISIT:  New Medications Ordered This Visit   Medications   • lisdexamfetamine (VYVANSE) 50 MG capsule     Sig: Take 1 capsule by mouth Every Morning     Dispense:  30 capsule     Refill:  0   • buPROPion XL (Wellbutrin XL) 300 MG 24 hr tablet     Sig: Take 1 tablet by mouth Daily.     Dispense:  90 tablet     Refill:  0       Return in about 4 weeks (around 8/4/2021), or if symptoms worsen or fail to improve, for Recheck.       Patient will follow-up in 4 weeks, highly encouraged the patient if he had any questions or concerns to contact the behavioral health [facility] clinic for sooner appointment patient verbalized understanding.      Functional Status: Mild impairment     Prognosis:  Fair with Ongoing Treatment             This document has been electronically signed by BETTY Sandy  July 7, 2021 11:56 EDT    Part of this note may be an electronic transcription/translation of spoken language to printed text using the Dragon Dictation System.

## 2021-07-09 ENCOUNTER — LAB (OUTPATIENT)
Dept: LAB | Facility: HOSPITAL | Age: 36
End: 2021-07-09

## 2021-07-09 ENCOUNTER — PRIOR AUTHORIZATION (OUTPATIENT)
Dept: FAMILY MEDICINE CLINIC | Facility: CLINIC | Age: 36
End: 2021-07-09

## 2021-07-09 ENCOUNTER — OFFICE VISIT (OUTPATIENT)
Dept: FAMILY MEDICINE CLINIC | Facility: CLINIC | Age: 36
End: 2021-07-09

## 2021-07-09 VITALS
WEIGHT: 260.2 LBS | BODY MASS INDEX: 36.43 KG/M2 | HEART RATE: 92 BPM | HEIGHT: 71 IN | SYSTOLIC BLOOD PRESSURE: 142 MMHG | OXYGEN SATURATION: 98 % | DIASTOLIC BLOOD PRESSURE: 80 MMHG

## 2021-07-09 DIAGNOSIS — R79.89 LOW TESTOSTERONE: Chronic | ICD-10-CM

## 2021-07-09 DIAGNOSIS — I10 ESSENTIAL HYPERTENSION: Primary | Chronic | ICD-10-CM

## 2021-07-09 LAB — TESTOST SERPL-MCNC: 116 NG/DL (ref 249–836)

## 2021-07-09 PROCEDURE — 84403 ASSAY OF TOTAL TESTOSTERONE: CPT

## 2021-07-09 PROCEDURE — 99214 OFFICE O/P EST MOD 30 MIN: CPT | Performed by: FAMILY MEDICINE

## 2021-07-09 PROCEDURE — 36415 COLL VENOUS BLD VENIPUNCTURE: CPT

## 2021-07-09 RX ORDER — AMLODIPINE BESYLATE 2.5 MG/1
2.5 TABLET ORAL NIGHTLY
Qty: 90 TABLET | Refills: 1 | Status: SHIPPED | OUTPATIENT
Start: 2021-07-09 | End: 2021-09-30

## 2021-07-09 RX ORDER — TESTOSTERONE 12.5 MG/1.25G
2 GEL TOPICAL EVERY MORNING
Qty: 75 G | Refills: 0 | Status: SHIPPED | OUTPATIENT
Start: 2021-07-09 | End: 2021-07-27

## 2021-07-09 RX ORDER — LOSARTAN POTASSIUM 100 MG/1
100 TABLET ORAL DAILY
Qty: 90 TABLET | Refills: 1 | Status: SHIPPED | OUTPATIENT
Start: 2021-07-09 | End: 2021-09-28

## 2021-07-09 NOTE — PROGRESS NOTES
"Chief Complaint  Hypertension (Pt. is here for a Hypertension followup. Pt.states that he has been checking it at home and it has been running pretty normal. )    Subjective          Frank Champion presents to [facility]  History of Present Illness     He had increased BP today had more caffeine and sleep deprivation.  130/80 at home usually. No side effects with amlodipine.     He increased testosterone to 2 pumps. He feels different with higher libido and energy levels in general.     He has also had increased Vyvanse dose.         Objective   Vital Signs:   /80   Pulse 92   Ht 181.6 cm (71.5\")   Wt 118 kg (260 lb 3.2 oz)   SpO2 98%   BMI 35.79 kg/m²       Vitals:    07/09/21 0826   BP: 142/80   Pulse: 92   SpO2: 98%   Weight: 118 kg (260 lb 3.2 oz)   Height: 181.6 cm (71.5\")   PainSc: 0-No pain       Physical Exam  Vitals reviewed.   Constitutional:       General: He is not in acute distress.     Appearance: He is obese. He is not ill-appearing.   Cardiovascular:      Rate and Rhythm: Normal rate and regular rhythm.   Pulmonary:      Effort: Pulmonary effort is normal.      Breath sounds: Normal breath sounds.   Neurological:      Mental Status: He is alert.        Result Review :   The following data was reviewed by (3Optional): Magaly De Jesus MD on 07/09/2021:  Common labs    Common Labsle 8/14/20 8/14/20 4/15/21 4/15/21 4/15/21    1006 1006 0847 [id number] 0847   Glucose    89    BUN    21 (A)    Creatinine    0.95    eGFR Non African Am    91    Sodium    139    Potassium    5.4 (A)    Chloride    104    Calcium    9.8    Albumin    4.80    Total Bilirubin    0.3    Alkaline Phosphatase    58    AST (SGOT)    25    ALT (SGPT)    46 (A)    WBC 7.71  9.00     Hemoglobin 14.1  14.2     Hematocrit 41.1  40.8     Platelets 387  385     Total Cholesterol  204 (A)   190   Triglycerides  208 (A)   142   HDL Cholesterol  33 (A)   36 (A)   LDL Cholesterol   129 (A)   128 " (A)   (A) Abnormal value                     SCANNED - LABS (05/24/2021) UDS appropriate  MEDICATIONS - SCAN - CONTROLLED SUBSTANCE AGREEMENT (05/24/2021)    Testosterone (05/24/2021 08:31)      Assessment and Plan    Diagnoses and all orders for this visit:    1. Essential hypertension (Primary)  -     amLODIPine (Norvasc) 2.5 MG tablet; Take 1 tablet by mouth Every Night.  Dispense: 90 tablet; Refill: 1  -     losartan (COZAAR) 100 MG tablet; Take 1 tablet by mouth Daily.  Dispense: 90 tablet; Refill: 1  Mild elevation today but home readings controlled with addition of norvasc. Continue norvasc and losartan.    2. Low testosterone  -     testosterone 12.5 MG/ACT (1%) gel; Place 2 Pump on the skin as directed by provider Every Morning.  Dispense: 75 g; Refill: 0  -     Testosterone; Future  Check follow-up lab today with dose increase.       Follow Up   Return in about 3 months (around 10/9/2021) for Office visit testosterone, HTN .  Patient was given instructions and counseling regarding his condition or for health maintenance advice. Please see specific information pulled into the AVS if appropriate.     Electronically signed by Magaly De Jesus MD, 07/09/21, 8:49 AM EDT.

## 2021-07-09 NOTE — TELEPHONE ENCOUNTER
(Key: [id number]) - [id number]  Testosterone 12.5 MG/ACT(1%) gel   Status Sent to [facility]  Created: July 9th, 2021    Denied on July 23  This request has not been approved. Based on the information submitted for review, you did not meet our guideline rules for the requested drug. In order for your request to be approved, your provider would need to show that you have met the guideline rules below. The details below are written in medical language. If you have questions, please contact your provider. In some cases, the requested medication or alternatives offered may have additional approval requirements. Our guideline named ANDROGENIC AGENTS requires the following rule(s) be met for approval: A. The member had at least a 30-day trial and therapeutic failure [drug did not work], allergy, contraindication [harmful for] or intolerance [side effect] to 2 preferred agents: Androderm, testosterone gel pump. Your doctor told us that you have abnormal findings of the blood chemistry (a hormone condition). We do not have information showing that you have tried 2 preferred agents. That is why your information was denied. Please work with your doctor to obtain more information in the form of chart notes if this will allow us to approve your request. A written notification letter will follow with additional details.

## 2021-07-13 ENCOUNTER — TELEMEDICINE (OUTPATIENT)
Dept: PSYCHIATRY | Facility: CLINIC | Age: 36
End: 2021-07-13

## 2021-07-13 DIAGNOSIS — F33.0 MILD EPISODE OF RECURRENT MAJOR DEPRESSIVE DISORDER (HCC): ICD-10-CM

## 2021-07-13 DIAGNOSIS — F90.0 ATTENTION DEFICIT HYPERACTIVITY DISORDER (ADHD), PREDOMINANTLY INATTENTIVE TYPE: Primary | ICD-10-CM

## 2021-07-13 DIAGNOSIS — F41.1 GENERALIZED ANXIETY DISORDER: ICD-10-CM

## 2021-07-13 PROCEDURE — 90832 PSYTX W PT 30 MINUTES: CPT | Performed by: COUNSELOR

## 2021-07-13 NOTE — PROGRESS NOTES
"Date: July 21, 2021  Time In: 1236  Time Out: 1300  This provider is located at the [facility]through [facility]), [address], [place] [address] using a secure [employer] Video Visit through [employer]. Patient is being seen remotely via telehealth at home address in [place] and stated they are in a secure environment for this session. The patient's condition being diagnosed/treated is appropriate for telemedicine. The provider identified herself as well as her credentials. The patient, and/or patients guardian, consent to be seen remotely, and when consent is given they understand that the consent allows for patient identifiable information to be sent to a third party as needed. They may refuse to be seen remotely at any time. The electronic data is encrypted and password protected, and the patient and/or guardian has been advised of the potential risks to privacy not withstanding such measures.     You have chosen to receive care through a telehealth visit.  Do you consent to use a video/audio connection for your medical care today? Yes    PROGRESS NOTE  Data:  Frank Champion is a 35 y.o. male who presents today for follow up    Chief Complaint: ADHD    History of Present Illness: Patient reports that his marriage has been going \"really well\" since last therapy session. Patient reports that his wife is also in therapy and seems to be doing really well. Patient new employment as well as recent move. Patient reports that these events has developed \"healthy anxiety\". Patient reports ADHD medication and identifies them as helpful. Patient does identify one stressor related to his wife's inability to cope during certain situations; Patient explains that during a time of stress his wife will shut down and feels as if he doesn't have support or help during these times and will become frustrated with her and will lash out. Patient explains that when his wife does shut down that " this is something out of her control and he believes he should be able to control his response better.     Clinical Maneuvering/Intervention:    (Scales based on 0 - 10 with 10 being the worst)  Depression: 5 Anxiety: 5       Assisted patient in processing above session content; acknowledged and normalized patient’s thoughts, feelings, and concerns.  Rationalized patient thought process regarding the difference between unhealthy and healthy anxiety.  Discussed triggers associated with patient's anxiety and depression.  Also discussed coping skills for patient to implement such as impulse control and emotional regulation practices.    Allowed patient to freely discuss issues without interruption or judgment. Provided safe, confidential environment to facilitate the development of positive therapeutic relationship and encourage open, honest communication. Assisted patient in identifying risk factors which would indicate the need for higher level of care including thoughts to harm self or others and/or self-harming behavior and encouraged patient to contact this office, call 911, or present to the nearest emergency room should any of these events occur. Discussed crisis intervention services and means to access. Patient adamantly and convincingly denies current suicidal or homicidal ideation or perceptual disturbance.    Assessment:   Assessment   Patient appears to maintain relative stability as compared to their baseline.  However, patient continues to struggle with ADHD, anxiety, and depression which continues to cause impairment in important areas of functioning.  A result, they can be reasonably expected to continue to benefit from treatment and would likely be at increased risk for decompensation otherwise.    Mental Status Exam:   Hygiene:   good  Cooperation:  Cooperative  Eye Contact:  Good  Psychomotor Behavior:  Appropriate  Affect:  Appropriate  Mood: normal  Speech:  Normal  Thought Process:   Linear  Thought Content:  Normal and Mood congruent  Suicidal:  None  Homicidal:  None  Hallucinations:  None  Delusion:  None  Memory:  Intact  Orientation:  Person, Place, Time and Situation  Reliability:  fair  Insight:  Fair  Judgement:  Fair  Impulse Control:  Fair  Physical/Medical Issues:  No        Patient's Support Network Includes:  wife    Functional Status: Mild impairment     Progress toward goal: Not at goal    Prognosis: Good with Ongoing Treatment          Plan:    Patient will continue in individual outpatient therapy with focus on improved functioning and coping skills, maintaining stability, and avoiding decompensation and the need for higher level of care.    Patient will adhere to medication regimen as prescribed and report any side effects. Patient will contact this office, call 911 or present to the nearest emergency room should suicidal or homicidal ideations occur. Provide Cognitive Behavioral Therapy and Solution Focused Therapy to improve functioning, maintain stability, and avoid decompensation and the need for higher level of care.     Return in about 4 weeks, or earlier if symptoms worsen or fail to improve.           VISIT DIAGNOSIS:     ICD-10-CM ICD-9-CM   1. Attention deficit hyperactivity disorder (ADHD), predominantly inattentive type  F90.0 314.00   2. Generalized anxiety disorder  F41.1 300.02   3. Mild episode of recurrent major depressive disorder (CMS/[facility])  F33.0 296.31          [facility] No Show Policy:  We understand unexpected circumstances arise; however, anytime you miss your appointment we are unable to provide you appropriate care.  In addition, each appointment missed could have been used to provide care for others.  We ask that you call at least 24 hours in advance to cancel or reschedule an appointment.  We would like to take this opportunity to remind you of our policy stating patients who miss THREE or more appointments without cancelling  or rescheduling 24 hours in advance of the appointment may be subject to cancellation of any further visits with our clinic and recommendation to seek in-person services/visits.    Please call [phone] to reschedule your appointment. If there are reasons that make it difficult for you to keep the appointments, please call and let us know how we can help.  Please understand that medication prescribing will not continue without seeing your provider.      [facility]'s No Show Policy reviewed with patient at today's visit. Patient verbalized understanding of this policy. Discussed with patient that in the event that there are three or more no show visits, it will be recommended that they pursue in-person services/visits as noncompliance with telehealth visits indicates that patient is not an appropriate candidate for telemedicine and would likely be more appropriate for in-person services/visits. Patient verbalizes understanding and is agreeable to this.        This document has been electronically signed by Julienne Cobb LCSW  July 21, 2021 10:10 EDT      Part of this note may be an electronic transcription/translation of spoken language to printed text using the Dragon Dictation System.

## 2021-07-27 ENCOUNTER — PRIOR AUTHORIZATION (OUTPATIENT)
Dept: FAMILY MEDICINE CLINIC | Facility: CLINIC | Age: 36
End: 2021-07-27

## 2021-07-27 ENCOUNTER — TELEPHONE (OUTPATIENT)
Dept: FAMILY MEDICINE CLINIC | Facility: CLINIC | Age: 36
End: 2021-07-27

## 2021-07-27 RX ORDER — TESTOSTERONE 12.5 MG/1.25G
25 GEL TOPICAL DAILY
Qty: 2.5 G | Refills: 2 | Status: SHIPPED | OUTPATIENT
Start: 2021-07-27 | End: 2021-09-18

## 2021-07-27 NOTE — TELEPHONE ENCOUNTER
Called and spoke with patient he is fine with trying the androderm testosterone cream or gel sent to [facility] on [address]

## 2021-07-27 NOTE — TELEPHONE ENCOUNTER
Contacted pharmacy and informed that per PCP to dispense 1 month supply with 2 refills. Pharmacist verbalized understanding and also stated the medication need a prior authorization.       PA sent to plan, PA encounter created.

## 2021-07-27 NOTE — TELEPHONE ENCOUNTER
Caller: [id number] - [facility] [address] AT [facility] [address] - [phone]  - [phone] FX    Relationship to patient: Pharmacy    Best call back number: [phone]    Patient is needing: PHARMACY WAS ASKING QUANTITY ON testosterone (AndroGel) 25 MG/2.5GM (1%) gel gel MEDICATION    PLEASE ADVISE

## 2021-08-03 ENCOUNTER — TELEMEDICINE (OUTPATIENT)
Dept: PSYCHIATRY | Facility: CLINIC | Age: 36
End: 2021-08-03

## 2021-08-03 DIAGNOSIS — F33.0 MILD EPISODE OF RECURRENT MAJOR DEPRESSIVE DISORDER (HCC): ICD-10-CM

## 2021-08-03 DIAGNOSIS — F90.0 ATTENTION DEFICIT HYPERACTIVITY DISORDER (ADHD), PREDOMINANTLY INATTENTIVE TYPE: Primary | Chronic | ICD-10-CM

## 2021-08-03 DIAGNOSIS — F41.1 GENERALIZED ANXIETY DISORDER: Chronic | ICD-10-CM

## 2021-08-03 PROCEDURE — 99214 OFFICE O/P EST MOD 30 MIN: CPT | Performed by: NURSE PRACTITIONER

## 2021-08-03 NOTE — PROGRESS NOTES
"This provider is located at the [facility] (through [facility]), [address], [place], [address] using a secure [employer] Video Visit through [employer]. Patient is being seen remotely via telehealth at their home address in [place], and stated they are in a secure environment for this session. The patient's condition being diagnosed/treated is appropriate for telemedicine. The provider identified herself as well as her credentials. The patient, and/or patients guardian, consent to be seen remotely, and when consent is given they understand that the consent allows for patient identifiable information to be sent to a third party as needed. They may refuse to be seen remotely at any time. The electronic data is encrypted and password protected, and the patient and/or guardian has been advised of the potential risks to privacy not withstanding such measures.    You have chosen to receive care through a telehealth visit.  Do you consent to use a video/audio connection for your medical care today? Yes     Subjective   Frank Champion is a 35 y.o. male who is here today for medication management follow up.    Chief Complaint: Depression, anxiety, ADHD    History of Present Illness: The patient describes his mood as \"good\" over the last few weeks.  Patient states that things been going well for him over the past few weeks.  Patient states that he has been a little stressed recently with a lot of changes.  Patient states that he did start his new job and he is on his second day in this position.  Patient states that he also had financial concerns due to having to take a significant pay cut to go to this job, patient states that overall he thinks he is doing very well and coping with things well.  Patient states that he is noticed a significant improvement in ADHD symptoms since he began increased dose of Vyvanse.  Patient states he is very pleased with this and states that symptoms " are well controlled at this time.  The patient reports his appetite as good.  The patient reports his sleep as good.  The patient denies any nightmares or bad dreams.  The patient denies any new medical problems or changes in medications since last appointment with this facility.  Patient states that his blood pressures continue to be well controlled and his PCPs management of blood pressure with medication has been very effective.  The patient reports compliance with current medication regimen.  The patient denies any current side effects from his current medication regimen.  The patient denies any abnormal muscle movements or tics.  The patient rates his ADHD 2-3/10 on a 0-10 scale, with 10 being the worst.  The patient rates his depression at a 1/10 on a 0-10 scale, with 10 being the worst.  The patient rates his anxiety at a 3-4/10 on a 0-10 scale, with 10 being the worst.  The patient rates hopelessness at a 0/10 on a 0-10 scale with 10 being the worst.  The patient would like to not adjust or change his medications at this visit.  The patient denies suicidal ideations and homicidal ideations, and is convincing.  The patient denies any auditory hallucinations or visual hallucinations. The patient does not endorse significant symptoms consistent with bipolar disorder or a psychotic illness.                  Prior Psychiatric Medications:  Tenex 1 mg nightly. States that this medication caused excessive sedation and daytime drowsiness.   Strattera 40 mg daily. States this medications was effective for ADHD, but caused severe urinary and sexual side effects that he could not tolerate.  Reports that these side effects began 2-3 weeks after beginning Straterra and stopped about 1 week after discontinuing it.   Adderall XR 15 mg daily - only mildly effective for ADHD  Adderall XR 20 mg daily - significant increase in anxiety and irritability with no improvement in focus and concentration from the 15 mg   Concerta 36  and 54 mg daily - ineffective        The following portions of the patient's history were reviewed and updated as appropriate: allergies, current medications, past family history, past medical history, past social history, past surgical history and problem list.    Review of Systems   Constitutional: Negative for activity change, appetite change, fatigue and unexpected weight change.   Psychiatric/Behavioral: Positive for decreased concentration. Negative for agitation, behavioral problems, confusion, dysphoric mood, hallucinations, self-injury, sleep disturbance and suicidal ideas. The patient is nervous/anxious. The patient is not hyperactive.        Objective   Physical Exam  Constitutional:       Appearance: Normal appearance.   Neurological:      Mental Status: He is alert.   Psychiatric:         Attention and Perception: Attention and perception normal.         Mood and Affect: Affect normal. Mood is anxious.         Speech: Speech normal.         Behavior: Behavior normal. Behavior is cooperative.         Thought Content: Thought content normal. Thought content does not include homicidal or suicidal ideation. Thought content does not include homicidal or suicidal plan.         Cognition and Memory: Cognition and memory normal.         Judgment: Judgment normal.       There were no vitals taken for this visit.    The patient was seen remotely today via a [employer] Video Visit through [facility].  Unable to obtain vital signs due to nature of remote visit.  Height stated at 71.5 inches.  Weight stated at 260 pounds.     Allergies   Allergen Reactions   • Guanfacine Other (See Comments)     sedation   • Strattera [Atomoxetine] Other (See Comments)     Erectile dysfunction, difficulty urinating, urinary incontinence   • Sulfa Antibiotics GI Intolerance       Recent Results (from the past 2016 hour(s))   Testosterone    Collection Time: 05/24/21  8:31 AM    Specimen: Blood   Result Value Ref Range    Testosterone, Total  211.00 (L) 249.00 - 836.00 ng/dL   Testosterone    Collection Time: 07/09/21  8:47 AM    Specimen: Blood   Result Value Ref Range    Testosterone, Total 116.00 (L) 249.00 - 836.00 ng/dL       Current Medications:   Current Outpatient Medications   Medication Sig Dispense Refill   • amLODIPine (Norvasc) 2.5 MG tablet Take 1 tablet by mouth Every Night. 90 tablet 1   • buPROPion XL (Wellbutrin XL) 300 MG 24 hr tablet Take 1 tablet by mouth Daily. 90 tablet 0   • lisdexamfetamine (VYVANSE) 50 MG capsule Take 1 capsule by mouth Every Morning 30 capsule 0   • losartan (COZAAR) 100 MG tablet Take 1 tablet by mouth Daily. 90 tablet 1   • omeprazole (priLOSEC) 20 MG capsule Take 20 mg by mouth Daily.     • testosterone (AndroGel) 25 MG/2.5GM (1%) gel gel Place 25 mg on the skin as directed by provider Daily. 2.5 g 2     No current facility-administered medications for this visit.       Mental Status Exam:   Hygiene:   good  Cooperation:  Cooperative  Eye Contact:  Good  Psychomotor Behavior:  Appropriate  Affect:  Full range  Hopelessness: Denies  Speech:  Normal  Thought Process:  Goal directed  Thought Content:  Normal  Suicidal:  None  Homicidal:  None  Hallucinations:  None  Delusion:  None  Memory:  Intact  Orientation:  Person, Place, Time and Situation  Reliability:  good  Insight:  Good  Judgement:  Good  Impulse Control:  Good  Physical/Medical Issues:  Yes See medical history       Past available note from PCP reviewed by this APRN at today's encounter.      Assessment/Plan   Diagnoses and all orders for this visit:    1. Attention deficit hyperactivity disorder (ADHD), predominantly inattentive type (Primary)  -     lisdexamfetamine (VYVANSE) 50 MG capsule; Take 1 capsule by mouth Every Morning  Dispense: 30 capsule; Refill: 0    2. Generalized anxiety disorder    3. Mild episode of recurrent major depressive disorder (CMS/[facility])            Visit Diagnoses:    ICD-10-CM ICD-9-CM   1. Attention deficit hyperactivity  disorder (ADHD), predominantly inattentive type  F90.0 314.00   2. Generalized anxiety disorder  F41.1 300.02   3. Mild episode of recurrent major depressive disorder (CMS/HCC)  F33.0 296.31       GOALS:  Short Term Goals: Patient will be compliant with medication, and patient will have no significant medication related side effects.  Patient will be engaged in psychotherapy as indicated.  Patient will report subjective improvement of symptoms.  Long term goals: To stabilize mood and treat/improve subjective symptoms, the patient will stay out of the hospital, the patient will be at an optimal level of functioning, and the patient will take all medications as prescribed.  The patient verbalized understanding and agreement with goals that were mutually set.      TREATMENT PLAN/GOALS: Continue medications and treatment plan as indicated. Treatment and medication options discussed during today's visit. Patient acknowledged and verbally consented to continue with current treatment plan and was educated on the importance of compliance with treatment and follow-up appointments.      -Continue Vyvanse 50 mg daily  -Continue Wellbutrin  mg daily  -Continue psychotherapy      MEDICATION ISSUES: Discussed medication options and treatment plan of prescribed medication, any off label use of medication, as well as the risks, benefits, any black box warnings including increased suicidality, and side effects including but not limited to potential falls, dizziness, possible impaired driving, GI side effects (change in appetite, abdominal discomfort, nausea, vomiting, diarrhea, and/or constipation), dry mouth, somnolence, sedation, insomnia, activation, agitation, irritation, tremors, abnormal muscle movements or disorders, headache, sweating, possible bruising or rare bleeding, electrolyte and/or fluid abnormalities, change in blood pressure/heart rate/and or heart rhythm, sexual dysfunction, and metabolic adversities among  others. Patient and/or guardian agreeable to call the office with any worsening of symptoms or onset of side effects, or if any concerns or questions arise.  The contact information for the office is made available to the patient and/or guardian.  Patient and/or guardian agreeable to call 911 or go to the nearest ER should they begin having any SI/HI, or if any urgent concerns arise. No medication side effects or related complaints today.    The patient is being prescribed a controlled substance as part of the treatment plan. The patient/guardian has been educated of appropriate use of the medication(s), including risks and side effects such as insomnia, headache, exacerbation of tics, nervousness, irritability, overstimulation, tremor, dizziness, anorexia or change in appetite, nausea, dry mouth, constipation, diarrhea, weight loss, sexual dysfunction, psychotic episodes, seizures, palpitations, tachycardia, hypertension, rare activation (activation of hypomania, chelsie, and/or suicidal ideations), cardiovascular adverse effects including sudden death (especially patients with pre-existing structural abnormalities often associated with a family history of cardiac disease), may slow normal growth in children, weight gain is reported but not expected, sedation is possible but activation is much more common, metabolic adversities, and overdose among others. Patient/guardian is also informed that the medication is to be used by the patient only, the medication is to be used only as directed, and the medication should not be combined with other substances unless directed by a Provider/Prescriber. The patient/guardian verbalized understanding and agreement with this in their own words, and wish to continue with current treatment plan.    Without the prescribed medication for ADHD, it is reported that the patient has problems with attention and focus including being easily distracted, easily losing objects, trouble with  time management, trouble completing tasks because of distractions, procrastination, indecisiveness, careless mistakes in daily activities/work/school, and not finishing jobs that are started.  Patient denies any side effects, no worsening of insomnia, and no worsening of anxiety on the medication dose.  Due to the reported problems without the medication usage, as well as the significant improvement in symptoms with the medication usage, the patient requests to remain on the prescribed medication for ADHD.      MEDS ORDERED DURING VISIT:  New Medications Ordered This Visit   Medications   • lisdexamfetamine (VYVANSE) 50 MG capsule     Sig: Take 1 capsule by mouth Every Morning     Dispense:  30 capsule     Refill:  0       Return in about 2 months (around 10/3/2021), or if symptoms worsen or fail to improve, for Recheck.       Patient will follow-up in 2 months, highly encouraged the patient if he had any questions or concerns to contact the behavioral health [facility] clinic for sooner appointment patient verbalized understanding.      Functional Status: Mild impairment     Prognosis: Good with Ongoing Treatment             This document has been electronically signed by BETTY Sandy  August 3, 2021 16:41 EDT    Part of this note may be an electronic transcription/translation of spoken language to printed text using the Dragon Dictation System.

## 2021-08-26 ENCOUNTER — TELEMEDICINE (OUTPATIENT)
Dept: PSYCHIATRY | Facility: CLINIC | Age: 36
End: 2021-08-26

## 2021-08-26 DIAGNOSIS — F90.0 ATTENTION DEFICIT HYPERACTIVITY DISORDER (ADHD), PREDOMINANTLY INATTENTIVE TYPE: Primary | ICD-10-CM

## 2021-08-26 PROCEDURE — 90832 PSYTX W PT 30 MINUTES: CPT | Performed by: COUNSELOR

## 2021-08-26 NOTE — PROGRESS NOTES
Date: August 26, 2021  Time In: 1630  Time Out: 1700  This provider is located at the [facility] ([facility]), [address], [place] [address] using a secure [employer] Video Visit through [employer]. Patient is being seen remotely via telehealth at home address in [place] and stated they are in a secure environment for this session. The patient's condition being diagnosed/treated is appropriate for telemedicine. The provider identified herself as well as her credentials. The patient, and/or patients guardian, consent to be seen remotely, and when consent is given they understand that the consent allows for patient identifiable information to be sent to a third party as needed. They may refuse to be seen remotely at any time. The electronic data is encrypted and password protected, and the patient and/or guardian has been advised of the potential risks to privacy not withstanding such measures.     You have chosen to receive care through a telehealth visit.  Do you consent to use a video/audio connection for your medical care today? Yes    PROGRESS NOTE  Data:  Frank Champion is a 35 y.o. male who presents today for follow up    Chief Complaint: ADHD    History of Present Illness: Patient reports starting a new job a month ago; positive experience thus far. Patient discussed increased anxiety last week due to missing a court hearing due to a speeding ticket; currently getting this addressed however Patient discussed having a panic and identified negative internal dialog due to this event. Patient reports effectiveness regarding check list at the door and inquires how to implement simliar method in other aspects.     Clinical Maneuvering/Intervention:    (Scales based on 0 - 10 with 10 being the worst)  Depression: 2 Anxiety: 3-4     Assisted patient in processing above session content; acknowledged and normalized patient’s thoughts, feelings, and concerns.  Raonalized  patient thought process regarding response to missed court hearing.  Discussed triggers associated with patient's ADHD and anxiety. Also discussed coping skills for patient to implement such as setting a planning period of daily tasks each night, using [place] program for goals, and grounding methods when feeling anxious.    Allowed patient to freely discuss issues without interruption or judgment. Provided safe, confidential environment to facilitate the development of positive therapeutic relationship and encourage open, honest communication. Assisted patient in identifying risk factors which would indicate the need for higher level of care including thoughts to harm self or others and/or self-harming behavior and encouraged patient to contact this office, call 911, or present to the nearest emergency room should any of these events occur. Discussed crisis intervention services and means to access. Patient adamantly and convincingly denies current suicidal or homicidal ideation or perceptual disturbance.    Assessment:   Assessment   Patient appears to maintain relative stability as compared to their baseline.  However, patient continues to struggle with ADHD which continues to cause impairment in important areas of functioning.  A result, they can be reasonably expected to continue to benefit from treatment and would likely be at increased risk for decompensation otherwise.    Mental Status Exam:   Hygiene:   good  Cooperation:  Cooperative  Eye Contact:  Good  Psychomotor Behavior:  Appropriate  Affect:  Appropriate  Mood: normal  Speech:  Normal  Thought Process:  Linear  Thought Content:  Normal  Suicidal:  None  Homicidal:  None  Hallucinations:  None  Delusion:  None  Memory:  Intact  Orientation:  Person, Place, Time and Situation  Reliability:  fair  Insight:  Fair  Judgement:  Fair  Impulse Control:  Fair  Physical/Medical Issues:  No        Patient's Support Network Includes:  wife    Functional Status:  Mild impairment     Progress toward goal: Not at goal    Prognosis: Fair with Ongoing Treatment          Plan:    Patient will continue in individual outpatient therapy with focus on improved functioning and coping skills, maintaining stability, and avoiding decompensation and the need for higher level of care.    Patient will adhere to medication regimen as prescribed and report any side effects. Patient will contact this office, call 911 or present to the nearest emergency room should suicidal or homicidal ideations occur. Provide Cognitive Behavioral Therapy and Solution Focused Therapy to improve functioning, maintain stability, and avoid decompensation and the need for higher level of care.     Return in about 4 weeks, or earlier if symptoms worsen or fail to improve.    VISIT DIAGNOSIS:     ICD-10-CM ICD-9-CM   1. Attention deficit hyperactivity disorder (ADHD), predominantly inattentive type  F90.0 314.00      [facility] No Show Policy:  We understand unexpected circumstances arise; however, anytime you miss your appointment we are unable to provide you appropriate care.  In addition, each appointment missed could have been used to provide care for others.  We ask that you call at least 24 hours in advance to cancel or reschedule an appointment.  We would like to take this opportunity to remind you of our policy stating patients who miss THREE or more appointments without cancelling or rescheduling 24 hours in advance of the appointment may be subject to cancellation of any further visits with our clinic and recommendation to seek in-person services/visits.    Please call [phone] to reschedule your appointment. If there are reasons that make it difficult for you to keep the appointments, please call and let us know how we can help.  Please understand that medication prescribing will not continue without seeing your provider.      [facility]'s No Show Policy  reviewed with patient at today's visit. Patient verbalized understanding of this policy. Discussed with patient that in the event that there are three or more no show visits, it will be recommended that they pursue in-person services/visits as noncompliance with telehealth visits indicates that patient is not an appropriate candidate for telemedicine and would likely be more appropriate for in-person services/visits. Patient verbalizes understanding and is agreeable to this.        This document has been electronically signed by Julienne Cobb LCSW  August 26, 2021 17:03 EDT      Part of this note may be an electronic transcription/translation of spoken language to printed text using the Dragon Dictation System.

## 2021-09-18 ENCOUNTER — OFFICE VISIT (OUTPATIENT)
Dept: FAMILY MEDICINE CLINIC | Facility: CLINIC | Age: 36
End: 2021-09-18

## 2021-09-18 VITALS
HEIGHT: 71 IN | DIASTOLIC BLOOD PRESSURE: 84 MMHG | HEART RATE: 100 BPM | BODY MASS INDEX: 36.12 KG/M2 | SYSTOLIC BLOOD PRESSURE: 136 MMHG | WEIGHT: 258 LBS | OXYGEN SATURATION: 98 %

## 2021-09-18 DIAGNOSIS — Z31.0 ADMISSION FOR REVERSAL OF VASECTOMY: ICD-10-CM

## 2021-09-18 DIAGNOSIS — I10 ESSENTIAL HYPERTENSION: Chronic | ICD-10-CM

## 2021-09-18 DIAGNOSIS — H91.93 BILATERAL HEARING LOSS, UNSPECIFIED HEARING LOSS TYPE: Primary | ICD-10-CM

## 2021-09-18 PROCEDURE — 99214 OFFICE O/P EST MOD 30 MIN: CPT | Performed by: FAMILY MEDICINE

## 2021-09-18 NOTE — PROGRESS NOTES
"Chief Complaint  Sterilization (Pt is here to discuss a vasectomy reversal and fertility issues. ) and Hearing Problem (Pt states that he would like to get a referral to have his ears checked. )    Subjective          Frank Champion presents to [facility]  Hearing Problem       Answers for HPI/ROS submitted by the patient on 9/16/2021  What is the primary reason for your visit?: Other  Please describe your symptoms.: No symptoms, just want a consultation on vasectomy reversal and questions about fertility.  Have you had these symptoms before?: No  How long have you been having these symptoms?: 1-4 days  Please list any medications you are currently taking for this condition.: Na  Please describe any probable cause for these symptoms. : Na      Wife and he are thinking about having vasectomy reversal. Surgery in 2016. He has stopped taking testosterone 1.5 months.     Muffled sounds, has to turn tv up really loud. He has to ask people to repeat. Wife has noticed hearing problems.         Objective   Vital Signs:   /84   Pulse 100   Ht 181.6 cm (71.5\")   Wt 117 kg (258 lb)   SpO2 98%   BMI 35.49 kg/m²     Vitals:    09/18/21 0948 09/18/21 1002   BP: 138/98 136/84   Pulse: 100    SpO2: 98%    Weight: 117 kg (258 lb)    Height: 181.6 cm (71.5\")    PainSc: 0-No pain        Physical Exam  Vitals reviewed.   Constitutional:       General: He is not in acute distress.     Appearance: He is obese. He is not ill-appearing.   HENT:      Right Ear: Tympanic membrane and ear canal normal.      Left Ear: Tympanic membrane and ear canal normal.   Cardiovascular:      Rate and Rhythm: Normal rate and regular rhythm.   Pulmonary:      Effort: Pulmonary effort is normal.      Breath sounds: Normal breath sounds.   Neurological:      Mental Status: He is alert.        Result Review :                  Assessment and Plan    Diagnoses and all orders for this visit:    1. Bilateral hearing loss, " unspecified hearing loss type (Primary)  -     Ambulatory Referral to Audiology  Normal ear exam. Refer for hearing exam.   2. Admission for reversal of vasectomy  -     Ambulatory Referral to Urology  Consultation with urology for vasectomy reversal.   3. Essential hypertension  Improved on recheck. Continue losartan and amlodipine.       Follow Up   Return if symptoms worsen or fail to improve.  Patient was given instructions and counseling regarding his condition or for health maintenance advice. Please see specific information pulled into the AVS if appropriate.     Electronically signed by Magaly De Jesus MD, 09/18/21, 10:06 AM EDT.

## 2021-09-28 DIAGNOSIS — I10 ESSENTIAL HYPERTENSION: Chronic | ICD-10-CM

## 2021-09-28 RX ORDER — LOSARTAN POTASSIUM 100 MG/1
100 TABLET ORAL DAILY
Qty: 90 TABLET | Refills: 0 | Status: SHIPPED | OUTPATIENT
Start: 2021-09-28 | End: 2021-12-06 | Stop reason: SDUPTHER

## 2021-09-28 NOTE — TELEPHONE ENCOUNTER
Rx Refill Note  Requested Prescriptions     Pending Prescriptions Disp Refills   • losartan (COZAAR) 100 MG tablet [Pharmacy Med Name: LOSARTAN 100MG TABLETS] 30 tablet      Sig: TAKE 1 TABLET BY MOUTH DAILY      Last office visit with prescribing clinician: 9/18/2021      Next office visit with prescribing clinician: 4/18/2022            Rody Steinberg MA  09/28/21, 15:59 EDT     Last lab 4/15/21

## 2021-09-30 DIAGNOSIS — I10 ESSENTIAL HYPERTENSION: Chronic | ICD-10-CM

## 2021-09-30 RX ORDER — AMLODIPINE BESYLATE 2.5 MG/1
2.5 TABLET ORAL NIGHTLY
Qty: 90 TABLET | Refills: 1 | Status: SHIPPED | OUTPATIENT
Start: 2021-09-30 | End: 2021-12-06 | Stop reason: SDUPTHER

## 2021-09-30 NOTE — TELEPHONE ENCOUNTER
Rx Refill Note  Requested Prescriptions     Pending Prescriptions Disp Refills   • amLODIPine (NORVASC) 2.5 MG tablet [Pharmacy Med Name: AMLODIPINE BESYLATE 2.5MG TABLETS] 30 tablet      Sig: TAKE 1 TABLET BY MOUTH EVERY NIGHT      Last office visit with prescribing clinician: 9/18/2021      Next office visit with prescribing clinician: 4/18/2022            Brandie Preciado MA  09/30/21, 11:14 EDT

## 2021-10-04 ENCOUNTER — TELEPHONE (OUTPATIENT)
Dept: PSYCHIATRY | Facility: CLINIC | Age: 36
End: 2021-10-04

## 2021-10-04 DIAGNOSIS — F90.0 ATTENTION DEFICIT HYPERACTIVITY DISORDER (ADHD), PREDOMINANTLY INATTENTIVE TYPE: Chronic | ICD-10-CM

## 2021-10-04 NOTE — TELEPHONE ENCOUNTER
I SCHEDULED PT FOR A FOLLOW UP APPT FOR TOMORROW BUT HE IS NEEDING A REFILL FOR HIS VYVANSE CURRENTLY OUT .

## 2021-10-05 ENCOUNTER — TELEMEDICINE (OUTPATIENT)
Dept: PSYCHIATRY | Facility: CLINIC | Age: 36
End: 2021-10-05

## 2021-10-05 DIAGNOSIS — F41.1 GENERALIZED ANXIETY DISORDER: Chronic | ICD-10-CM

## 2021-10-05 DIAGNOSIS — F33.0 MILD EPISODE OF RECURRENT MAJOR DEPRESSIVE DISORDER (HCC): Chronic | ICD-10-CM

## 2021-10-05 DIAGNOSIS — F90.0 ATTENTION DEFICIT HYPERACTIVITY DISORDER (ADHD), PREDOMINANTLY INATTENTIVE TYPE: Primary | Chronic | ICD-10-CM

## 2021-10-05 PROCEDURE — 99214 OFFICE O/P EST MOD 30 MIN: CPT | Performed by: NURSE PRACTITIONER

## 2021-10-05 RX ORDER — BUPROPION HYDROCHLORIDE 300 MG/1
300 TABLET ORAL DAILY
Qty: 90 TABLET | Refills: 0 | Status: SHIPPED | OUTPATIENT
Start: 2021-10-05 | End: 2022-01-06 | Stop reason: SDUPTHER

## 2021-10-05 NOTE — PROGRESS NOTES
"This provider is located at the [facility] (through [facility]), [address], [place], [address] using a secure [employer] Video Visit through [employer]. Patient is being seen remotely via telehealth at their home address in [place], and stated they are in a secure environment for this session. The patient's condition being diagnosed/treated is appropriate for telemedicine. The provider identified herself as well as her credentials. The patient, and/or patients guardian, consent to be seen remotely, and when consent is given they understand that the consent allows for patient identifiable information to be sent to a third party as needed. They may refuse to be seen remotely at any time. The electronic data is encrypted and password protected, and the patient and/or guardian has been advised of the potential risks to privacy not withstanding such measures.    You have chosen to receive care through a telehealth visit.  Do you consent to use a video/audio connection for your medical care today? Yes     Subjective   Frank Champion is a 35 y.o. male who is here today for medication management follow up.    Chief Complaint: Depression, anxiety, ADHD    History of Present Illness:  The patient describes his mood as \"good\" over the last few weeks.  Patient states that things have been going good for him over the past few weeks.  Patient states that he did forget his appointment last week which resulted in him running out of his Vyvanse.  Patient states that he was without the medication for a few days and endorses that during this time he had an exacerbation of ADHD symptoms, but states that he was able to get a refill and start medication back this morning and that things been going much better today.  Patient states that the Vyvanse is very effective for his ADHD symptoms, endorses that he is realize this more so since going without the medication for those few days.  Patient " states that overall he is very pleased with current management symptoms and his current psychiatric medication regimen.  Patient states that depression has been slightly increased over the past few weeks, but endorses that it is due to situational stressors.  Patient states that he is not happy with his current job and states that it is boring.  Patient states that he is looking for different employment at this time.  Patient states that since his increase in depressive symptoms is due to situational stressor he would not like to make any medication changes at this time.  The patient reports his appetite as good.  The patient reports his sleep as good.  The patient denies any nightmares or bad dreams.  The patient denies any new medical problems or changes in medications since last appointment with this facility.  The patient reports compliance with current medication regimen.  The patient denies any current side effects from his current medication regimen.  The patient denies any abnormal muscle movements or tics.  The patient rates his ADHD 2-3/10 on a 0-10 scale, with 10 being the worst.  The patient rates his depression at a 4-5/10 on a 0-10 scale, with 10 being the worst.  The patient rates his anxiety at a 2-3/10 on a 0-10 scale, with 10 being the worst.  The patient rates hopelessness at a 0/10 on a 0-10 scale with 10 being the worst.  The patient would like to not adjust or change his medications at this visit.  The patient denies suicidal ideations and homicidal ideations, and is convincing.  The patient denies any auditory hallucinations or visual hallucinations.  The patient does not endorse significant symptoms consistent with bipolar disorder or a psychotic illness.              Prior Psychiatric Medications:  Tenex 1 mg nightly. States that this medication caused excessive sedation and daytime drowsiness.   Strattera 40 mg daily. States this medications was effective for ADHD, but caused severe urinary  and sexual side effects that he could not tolerate.  Reports that these side effects began 2-3 weeks after beginning Straterra and stopped about 1 week after discontinuing it.   Adderall XR 15 mg daily - only mildly effective for ADHD  Adderall XR 20 mg daily - significant increase in anxiety and irritability with no improvement in focus and concentration from the 15 mg   Concerta 36 and 54 mg daily - ineffective      The following portions of the patient's history were reviewed and updated as appropriate: allergies, current medications, past family history, past medical history, past social history, past surgical history and problem list.    Review of Systems   Constitutional: Negative for activity change, appetite change, fatigue and unexpected weight change.   Psychiatric/Behavioral: Positive for decreased concentration and dysphoric mood. Negative for agitation, behavioral problems, confusion, hallucinations, self-injury, sleep disturbance and suicidal ideas. The patient is not nervous/anxious and is not hyperactive.        Objective   Physical Exam  Constitutional:       Appearance: Normal appearance.   Neurological:      Mental Status: He is alert.   Psychiatric:         Attention and Perception: Perception normal. He is inattentive.         Mood and Affect: Affect normal. Mood is depressed.         Speech: Speech normal.         Behavior: Behavior normal. Behavior is cooperative.         Thought Content: Thought content normal. Thought content does not include homicidal or suicidal ideation. Thought content does not include homicidal or suicidal plan.         Cognition and Memory: Cognition and memory normal.         Judgment: Judgment normal.       There were no vitals taken for this visit.    The patient was seen remotely today via a MyChart Video Visit through [facility].  Unable to obtain vital signs due to nature of remote visit.  Height stated at 71.5 inches.  Weight stated at 260 pounds.     Allergies    Allergen Reactions   • Guanfacine Other (See Comments)     sedation   • Strattera [Atomoxetine] Other (See Comments)     Erectile dysfunction, difficulty urinating, urinary incontinence   • Sulfa Antibiotics GI Intolerance       No results found for this or any previous visit (from the past 2016 hour(s)).    Current Medications:   Current Outpatient Medications   Medication Sig Dispense Refill   • amLODIPine (NORVASC) 2.5 MG tablet TAKE 1 TABLET BY MOUTH EVERY NIGHT 90 tablet 1   • buPROPion XL (Wellbutrin XL) 300 MG 24 hr tablet Take 1 tablet by mouth Daily. 90 tablet 0   • lisdexamfetamine (VYVANSE) 50 MG capsule Take 1 capsule by mouth Every Morning 30 capsule 0   • losartan (COZAAR) 100 MG tablet TAKE 1 TABLET BY MOUTH DAILY 90 tablet 0   • omeprazole (priLOSEC) 20 MG capsule Take 20 mg by mouth Daily.     • trimethoprim-polymyxin b (Polytrim) 17387-7.1 UNIT/ML-% ophthalmic solution Administer 1 drop into the left eye Every 4 (Four) Hours. 10 mL 0     No current facility-administered medications for this visit.       Mental Status Exam:   Hygiene:   good  Cooperation:  Cooperative  Eye Contact:  Good  Psychomotor Behavior:  Appropriate  Affect:  Full range  Hopelessness: Denies  Speech:  Normal  Thought Process:  Goal directed  Thought Content:  Normal  Suicidal:  None  Homicidal:  None  Hallucinations:  None  Delusion:  None  Memory:  Intact  Orientation:  Person, Place, Time and Situation  Reliability:  good  Insight:  Good  Judgement:  Good  Impulse Control:  Good  Physical/Medical Issues:  Yes See medical history       Assessment/Plan   Diagnoses and all orders for this visit:    1. Attention deficit hyperactivity disorder (ADHD), predominantly inattentive type (Primary)    2. Generalized anxiety disorder  -     buPROPion XL (Wellbutrin XL) 300 MG 24 hr tablet; Take 1 tablet by mouth Daily.  Dispense: 90 tablet; Refill: 0    3. Mild episode of recurrent major depressive disorder (HCC)  -     buPROPion XL  (Wellbutrin XL) 300 MG 24 hr tablet; Take 1 tablet by mouth Daily.  Dispense: 90 tablet; Refill: 0            Visit Diagnoses:    ICD-10-CM ICD-9-CM   1. Attention deficit hyperactivity disorder (ADHD), predominantly inattentive type  F90.0 314.00   2. Generalized anxiety disorder  F41.1 300.02   3. Mild episode of recurrent major depressive disorder (HCC)  F33.0 296.31       GOALS:  Short Term Goals: Patient will be compliant with medication, and patient will have no significant medication related side effects.  Patient will be engaged in psychotherapy as indicated.  Patient will report subjective improvement of symptoms.  Long term goals: To stabilize mood and treat/improve subjective symptoms, the patient will stay out of the hospital, the patient will be at an optimal level of functioning, and the patient will take all medications as prescribed.  The patient verbalized understanding and agreement with goals that were mutually set.      SUICIDE RISK ASSESSMENT: Unalterable demographics and a history of mental health intervention indicate this patient is in a high risk category compared to the general population. At present, the patient denies active SI/HI, intentions, or plans at this time and agrees to seek immediate care should such thoughts develop. The patient verbalizes understanding of how to access emergency care if needed and agrees to do so. Consideration of suicide risk and protective factors such as history, current presentation, individual strengths and weaknesses, psychosocial and environmental stressors and variables, psychiatric illness and symptoms, medical conditions and pain, took place in this interview. Based on those considerations, the patient is determined: within individual baseline and presenting no imminent risk for suicide or homicide. Other recommendations: The patient does not meet the criteria for inpatient admission and is not a safety risk to self or others at today's visit.  Inpatient treatment offers no significant advantages over outpatient treatment for this patient at today's visit.      SAFETY PLAN:  Patient was given ample time for questions and fully participated in treatment planning.  Patient was encouraged to call the clinic with any questions or concerns.  Patient was informed of access to emergency care. If patient were to develop any significant symptomatology, suicidal ideation, homicidal ideation, any concerns, or feel unsafe at any time they are to call the clinic and if unable to get immediate assistance should immediately call 911 or go to the nearest emergency room.  The patient is advised to remove or secure (lock away) all lethal weapons (including guns) and sharps (including razors, scissors, knives, etc.).  All medications (including any prescribed and any over the counter medications) should be stored in a safe and secured location that is not obtainable by children/adolescents.  Patient was given an opportunity and encouraged to ask questions about their medication, illness, and treatment. Patient contracted verbally for the following: If you are experiencing an emotional crisis or have thoughts of harming yourself or others, please go to your nearest local emergency room or call 911. Will continue to re-assess medication response and side effects frequently to establish efficacy and ensure safety. Risks, any black box warnings, side effects, off label usage, and benefits of medication and treatment discussed with patient, along with potential adverse side effects of current and/or newly prescribed medication, alternative treatment options, and OTC medications.  Patient verbalized understanding of potential risks, any off label use of medication, any black box warnings, and any side effects in their own words. The patient verbalized understanding and agreed to comply with the safety plan discussed in their own words.  Patient given the number to the office.  Number also available to the 24- hour suicide hotline.      TREATMENT PLAN/GOALS: Continue medications and treatment plan as indicated. Treatment and medication options discussed during today's visit. Patient acknowledged and verbally consented to continue with current treatment plan and was educated on the importance of compliance with treatment and follow-up appointments.      -Continue Vyvanse 50 mg daily for ADHD.  Refill for the medication sent in on 10/4/2021.  Instructed patient to request refill via [employer] or by calling the clinic approximately 4 to 5 days before running out of medication so that a refill can be sent in at an appropriate time.  -Continue Wellbutrin  mg daily for depression, anxiety, and ADHD  -Continue psychotherapy      MEDICATION ISSUES: Discussed medication options and treatment plan of prescribed medication, any off label use of medication, as well as the risks, benefits, any black box warnings including increased suicidality, and side effects including but not limited to potential falls, dizziness, possible impaired driving, GI side effects (change in appetite, abdominal discomfort, nausea, vomiting, diarrhea, and/or constipation), dry mouth, somnolence, sedation, insomnia, activation, agitation, irritation, tremors, abnormal muscle movements or disorders, headache, sweating, possible bruising or rare bleeding, electrolyte and/or fluid abnormalities, change in blood pressure/heart rate/and or heart rhythm, sexual dysfunction, and metabolic adversities among others. Patient and/or guardian agreeable to call the office with any worsening of symptoms or onset of side effects, or if any concerns or questions arise.  The contact information for the office is made available to the patient and/or guardian.  Patient and/or guardian agreeable to call 911 or go to the nearest ER should they begin having any SI/HI, or if any urgent concerns arise. No medication side effects or related  complaints today.    The patient is being prescribed a controlled substance as part of the treatment plan. The patient/guardian has been educated of appropriate use of the medication(s), including risks and side effects such as insomnia, headache, exacerbation of tics, nervousness, irritability, overstimulation, tremor, dizziness, anorexia or change in appetite, nausea, dry mouth, constipation, diarrhea, weight loss, sexual dysfunction, psychotic episodes, seizures, palpitations, tachycardia, hypertension, rare activation (activation of hypomania, chelsie, and/or suicidal ideations), cardiovascular adverse effects including sudden death (especially patients with pre-existing structural abnormalities often associated with a family history of cardiac disease), may slow normal growth in children, weight gain is reported but not expected, sedation is possible but activation is much more common, metabolic adversities, and overdose among others. Patient/guardian is also informed that the medication is to be used by the patient only, the medication is to be used only as directed, and the medication should not be combined with other substances unless directed by a Provider/Prescriber. The patient/guardian verbalized understanding and agreement with this in their own words, and wish to continue with current treatment plan.    Without the prescribed medication for ADHD, it is reported that the patient has problems with attention and focus including being easily distracted, easily losing objects, trouble with time management, trouble completing tasks because of distractions, procrastination, indecisiveness, careless mistakes in daily activities/work/school, and not finishing jobs that are started.  Patient denies any side effects, no worsening of insomnia, and no worsening of anxiety on the medication dose.  Due to the reported problems without the medication usage, as well as the significant improvement in symptoms with the  medication usage, the patient requests to remain on the prescribed medication for ADHD.          [facility] No Show Policy:  We understand unexpected circumstances arise; however, anytime you miss your appointment we are unable to provide you appropriate care.  In addition, each appointment missed could have been used to provide care for others.  We ask that you call at least 24 hours in advance to cancel or reschedule an appointment.  We would like to take this opportunity to remind you of our policy stating patients who miss THREE or more appointments without cancelling or rescheduling 24 hours in advance of the appointment may be subject to cancellation of any further visits with our clinic and recommendation to seek in-person services/visits.    Please call [phone] to reschedule your appointment. If there are reasons that make it difficult for you to keep the appointments, please call and let us know how we can help.  Please understand that medication prescribing will not continue without seeing your provider.      [facility]'s No Show Policy reviewed with patient at today's visit. Patient verbalized understanding of this policy. Discussed with patient that in the event that there are three or more no show visits, it will be recommended that they pursue in-person services/visits as noncompliance with telehealth visits indicates that patient is not an appropriate candidate for telemedicine and would likely be more appropriate for in-person services/visits. Patient verbalizes understanding and is agreeable to this.      MEDS ORDERED DURING VISIT:  New Medications Ordered This Visit   Medications   • buPROPion XL (Wellbutrin XL) 300 MG 24 hr tablet     Sig: Take 1 tablet by mouth Daily.     Dispense:  90 tablet     Refill:  0       Return in about 3 months (around 1/5/2022), or if symptoms worsen or fail to improve, for Recheck.       Patient will follow-up in 3  months, highly encouraged the patient if he had any questions or concerns to contact the behavioral health [facility] clinic for sooner appointment patient verbalized understanding.      Functional Status: Mild impairment     Prognosis: Good with Ongoing Treatment             This document has been electronically signed by BETTY Sandy  October 5, 2021 09:50 EDT    Part of this note may be an electronic transcription/translation of spoken language to printed text using the Dragon Dictation System.

## 2021-11-04 DIAGNOSIS — F90.0 ATTENTION DEFICIT HYPERACTIVITY DISORDER (ADHD), PREDOMINANTLY INATTENTIVE TYPE: Chronic | ICD-10-CM

## 2021-12-02 DIAGNOSIS — F90.0 ATTENTION DEFICIT HYPERACTIVITY DISORDER (ADHD), PREDOMINANTLY INATTENTIVE TYPE: Chronic | ICD-10-CM

## 2021-12-06 ENCOUNTER — OFFICE VISIT (OUTPATIENT)
Dept: FAMILY MEDICINE CLINIC | Facility: CLINIC | Age: 36
End: 2021-12-06

## 2021-12-06 ENCOUNTER — HOSPITAL ENCOUNTER (OUTPATIENT)
Dept: GENERAL RADIOLOGY | Facility: HOSPITAL | Age: 36
Discharge: HOME | End: 2021-12-06
Admitting: FAMILY MEDICINE

## 2021-12-06 VITALS
WEIGHT: 252.4 LBS | SYSTOLIC BLOOD PRESSURE: 138 MMHG | HEART RATE: 104 BPM | OXYGEN SATURATION: 99 % | BODY MASS INDEX: 35.34 KG/M2 | HEIGHT: 71 IN | DIASTOLIC BLOOD PRESSURE: 88 MMHG

## 2021-12-06 DIAGNOSIS — M25.562 LEFT KNEE PAIN, UNSPECIFIED CHRONICITY: Primary | ICD-10-CM

## 2021-12-06 DIAGNOSIS — I10 ESSENTIAL HYPERTENSION: Chronic | ICD-10-CM

## 2021-12-06 DIAGNOSIS — M25.562 LEFT KNEE PAIN, UNSPECIFIED CHRONICITY: ICD-10-CM

## 2021-12-06 PROCEDURE — 73560 X-RAY EXAM OF KNEE 1 OR 2: CPT

## 2021-12-06 PROCEDURE — 99214 OFFICE O/P EST MOD 30 MIN: CPT | Performed by: FAMILY MEDICINE

## 2021-12-06 RX ORDER — LOSARTAN POTASSIUM 100 MG/1
100 TABLET ORAL DAILY
Qty: 90 TABLET | Refills: 1 | Status: SHIPPED | OUTPATIENT
Start: 2021-12-06 | End: 2022-04-22 | Stop reason: SDUPTHER

## 2021-12-06 RX ORDER — AMLODIPINE BESYLATE 2.5 MG/1
2.5 TABLET ORAL NIGHTLY
Qty: 90 TABLET | Refills: 1 | Status: SHIPPED | OUTPATIENT
Start: 2021-12-06 | End: 2022-04-22 | Stop reason: SDUPTHER

## 2021-12-06 RX ORDER — MELOXICAM 15 MG/1
15 TABLET ORAL DAILY PRN
Qty: 30 TABLET | Refills: 3 | Status: SHIPPED | OUTPATIENT
Start: 2021-12-06 | End: 2022-04-22

## 2021-12-06 NOTE — PROGRESS NOTES
"Chief Complaint  Knee Pain (Pt complains of knee pain that has been going on for several weeks now. Pt states knee pain is much worse when extending his knee out. )    Subjective          Frank Champion presents to [facility]  Knee Pain   There was no injury mechanism. The pain is present in the left knee and right knee.     Gradual onset left knee pain w/o injury for 6 weeks. No swelling. Worse when at rest sitting knee bent and getting up. Underneath kneecap and inside knee. Feels like it needs to pop and crack, sometimes it does. Pain with walking steps both ways. Limping.     Blood pressure has been no higher than 138 over 80s.    Objective   Vital Signs:   /88   Pulse 104   Ht 180.3 cm (70.98\")   Wt 114 kg (252 lb 6.4 oz)   SpO2 99%   BMI 35.22 kg/m²     Physical Exam  Vitals reviewed.   Constitutional:       General: He is not in acute distress.     Appearance: He is not ill-appearing.   Cardiovascular:      Rate and Rhythm: Normal rate and regular rhythm.   Pulmonary:      Effort: Pulmonary effort is normal.      Breath sounds: Normal breath sounds.   Musculoskeletal:      Left knee: No swelling, deformity, effusion, erythema, ecchymosis or crepitus. Decreased range of motion ( extension). No LCL laxity, MCL laxity, ACL laxity or PCL laxity.     Instability Tests: Anterior drawer test negative. Posterior drawer test negative. Anterior Lachman test positive. Medial Melida test negative and lateral Melida test negative.      Comments: Pain with Lachman's.    Neurological:      Mental Status: He is alert.        Result Review :                 Assessment and Plan    Diagnoses and all orders for this visit:    1. Left knee pain, unspecified chronicity (Primary)  -     XR Knee 1 or 2 View Left; Future  -     MRI Knee Left Without Contrast; Future  -     meloxicam (MOBIC) 15 MG tablet; Take 1 tablet by mouth Daily As Needed for Moderate Pain .  Dispense: 30 tablet; " Refill: 3    2. Essential hypertension  -     amLODIPine (NORVASC) 2.5 MG tablet; Take 1 tablet by mouth Every Night.  Dispense: 90 tablet; Refill: 1  -     losartan (COZAAR) 100 MG tablet; Take 1 tablet by mouth Daily.  Dispense: 90 tablet; Refill: 1      Clicking, popping, limping, pain will check x-ray today and then further evaluation of soft tissues with MRI.  Start meloxicam for pain as ibuprofen has been ineffective.    Blood pressure stable.  Continue amlodipine and losartan.      Follow Up   Return if symptoms worsen or fail to improve.  Patient was given instructions and counseling regarding his condition or for health maintenance advice. Please see specific information pulled into the AVS if appropriate.     Electronically signed by Magaly De Jesus MD, 12/06/21, 4:34 PM EST.

## 2022-01-06 ENCOUNTER — TELEMEDICINE (OUTPATIENT)
Dept: PSYCHIATRY | Facility: CLINIC | Age: 37
End: 2022-01-06

## 2022-01-06 DIAGNOSIS — F33.0 MILD EPISODE OF RECURRENT MAJOR DEPRESSIVE DISORDER: Chronic | ICD-10-CM

## 2022-01-06 DIAGNOSIS — F90.0 ATTENTION DEFICIT HYPERACTIVITY DISORDER (ADHD), PREDOMINANTLY INATTENTIVE TYPE: Primary | Chronic | ICD-10-CM

## 2022-01-06 DIAGNOSIS — F41.1 GENERALIZED ANXIETY DISORDER: Chronic | ICD-10-CM

## 2022-01-06 PROCEDURE — 99214 OFFICE O/P EST MOD 30 MIN: CPT | Performed by: NURSE PRACTITIONER

## 2022-01-06 RX ORDER — BUPROPION HYDROCHLORIDE 300 MG/1
300 TABLET ORAL DAILY
Qty: 90 TABLET | Refills: 0 | Status: SHIPPED | OUTPATIENT
Start: 2022-01-06 | End: 2022-03-31 | Stop reason: SDUPTHER

## 2022-01-06 NOTE — PROGRESS NOTES
"This provider is located at the [facility] (through [facility]), [address], [facility], [address] using a secure [employer] Video Visit through [employer]. Patient is being seen remotely via telehealth at their home address in [place], and stated they are in a secure environment for this session. The patient's condition being diagnosed/treated is appropriate for telemedicine. The provider identified herself as well as her credentials. The patient, and/or patients guardian, consent to be seen remotely, and when consent is given they understand that the consent allows for patient identifiable information to be sent to a third party as needed. They may refuse to be seen remotely at any time. The electronic data is encrypted and password protected, and the patient and/or guardian has been advised of the potential risks to privacy not withstanding such measures.    You have chosen to receive care through a telehealth visit.  Do you consent to use a video/audio connection for your medical care today? Yes     Subjective   Frank Champion is a 36 y.o. male who is here today for medication management follow up.    Chief Complaint: Depression, anxiety, ADHD    History of Present Illness:  The patient describes his mood as \"okay\" over the last few weeks.  The patient states that he has been doing well over the past few months.  The patient states that his anxiety, depression, and ADHD have continued to be well controlled with his current psychiatric medication regimen.  The patient states that his only concern today is that his insurance will be changing at the end of February and he is unsure if the new insurance will cover his Vyvanse.  Discussed with the patient that once his insurance changes we can submit a new prior authorization to see get his medication approved.  Instructed the patient to notify the clinic once his insurance changes so that a new PA can be submitted.  The patient " verbalizes understanding and endorses that he is agreeable to this. The patient reports his appetite as good.  The patient reports his sleep as good.  The patient denies any nightmares or bad dreams.  The patient denies any new medical problems or changes in medications since last appointment with this facility.  The patient reports compliance with current medication regimen.  The patient denies any current side effects from his current medication regimen.  The patient denies any abnormal muscle movements or tics. The patient rates his ADHD 3-4/10 on a 0-10 scale, with 10 being the worst.   The patient rates his depression at a 2/10 on a 0-10 scale, with 10 being the worst.  The patient rates his anxiety at a 2/10 on a 0-10 scale, with 10 being the worst.  The patient rates hopelessness at a 0/10 on a 0-10 scale with 10 being the worst.  The patient would like to not adjust or change his medications at this visit.  The patient denies suicidal ideations and homicidal ideations, and is convincing.  The patient denies any auditory hallucinations or visual hallucinations.  The patient does not endorse significant symptoms consistent with bipolar disorder or a psychotic illness.                Prior Psychiatric Medications:  Tenex 1 mg nightly. States that this medication caused excessive sedation and daytime drowsiness.   Strattera 40 mg daily. States this medications was effective for ADHD, but caused severe urinary and sexual side effects that he could not tolerate.  Reports that these side effects began 2-3 weeks after beginning Straterra and stopped about 1 week after discontinuing it.   Adderall XR 15 mg daily - only mildly effective for ADHD  Adderall XR 20 mg daily - significant increase in anxiety and irritability with no improvement in focus and concentration from the 15 mg   Concerta 36 and 54 mg daily - ineffective      The following portions of the patient's history were reviewed and updated as appropriate:  allergies, current medications, past family history, past medical history, past social history, past surgical history and problem list.    Review of Systems   Constitutional: Negative for activity change, appetite change, fatigue and unexpected weight change.   Psychiatric/Behavioral: Positive for decreased concentration and dysphoric mood. Negative for agitation, behavioral problems, confusion, hallucinations, self-injury, sleep disturbance and suicidal ideas. The patient is nervous/anxious. The patient is not hyperactive.        Objective   Physical Exam  Constitutional:       Appearance: Normal appearance.   Neurological:      Mental Status: He is alert.   Psychiatric:         Attention and Perception: Perception normal. He is inattentive.         Mood and Affect: Affect normal. Mood is anxious.         Speech: Speech normal.         Behavior: Behavior normal. Behavior is cooperative.         Thought Content: Thought content normal. Thought content does not include homicidal or suicidal ideation. Thought content does not include homicidal or suicidal plan.         Cognition and Memory: Cognition and memory normal.         Judgment: Judgment normal.       There were no vitals taken for this visit.    The patient was seen remotely today via a MyChart Video Visit through [facility].  Unable to obtain vital signs due to nature of remote visit.  Height stated at 71.5 inches.  Weight stated at 260 pounds.     Allergies   Allergen Reactions   • Guanfacine Other (See Comments)     sedation   • Strattera [Atomoxetine] Other (See Comments)     Erectile dysfunction, difficulty urinating, urinary incontinence   • Sulfa Antibiotics GI Intolerance       No results found for this or any previous visit (from the past 2016 hour(s)).    Current Medications:   Current Outpatient Medications   Medication Sig Dispense Refill   • amLODIPine (NORVASC) 2.5 MG tablet Take 1 tablet by mouth Every Night. 90 tablet 1   • buPROPion XL (Wellbutrin  XL) 300 MG 24 hr tablet Take 1 tablet by mouth Daily. 90 tablet 0   • lisdexamfetamine (VYVANSE) 50 MG capsule Take 1 capsule by mouth Every Morning 30 capsule 0   • losartan (COZAAR) 100 MG tablet Take 1 tablet by mouth Daily. 90 tablet 1   • meloxicam (MOBIC) 15 MG tablet Take 1 tablet by mouth Daily As Needed for Moderate Pain . 30 tablet 3   • omeprazole (priLOSEC) 20 MG capsule Take 20 mg by mouth Daily.       No current facility-administered medications for this visit.       Mental Status Exam:   Hygiene:   good  Cooperation:  Cooperative  Eye Contact:  Good  Psychomotor Behavior:  Appropriate  Affect:  Full range  Hopelessness: Denies  Speech:  Normal  Thought Process:  Goal directed  Thought Content:  Normal  Suicidal:  None  Homicidal:  None  Hallucinations:  None  Delusion:  None  Memory:  Intact  Orientation:  Person, Place, Time and Situation  Reliability:  good  Insight:  Good  Judgement:  Good  Impulse Control:  Good  Physical/Medical Issues:  Yes See medical history         PHQ-2 Depression Screening  Little interest or pleasure in doing things? 1   Feeling down, depressed, or hopeless? 0   PHQ-2 Total Score 1         The ROSA report, request number [id number], of the past 12 months were reviewed and is appropriate.  The patient/guardian reports taking the medication only as prescribed.  The patient/guardian denies any abuse or misuse of the medication.  The patient/guardian denies any other substance use or issues.  There are no apparent substance related issues.  The patient reports no side effects of the current medication usage.  The patient/guardian has reported significant improvement with medication usage and wishes to continue medication as prescribed.  The patient/guardian is appropriate to continue with current medication usage at this time.  Reinforced risks and side effects of medication usage, patient and/or guardian verbalize understanding in their own words and are in agreement with  current plan.      Assessment/Plan   Diagnoses and all orders for this visit:    1. Attention deficit hyperactivity disorder (ADHD), predominantly inattentive type (Primary)  -     lisdexamfetamine (VYVANSE) 50 MG capsule; Take 1 capsule by mouth Every Morning  Dispense: 30 capsule; Refill: 0    2. Generalized anxiety disorder  -     buPROPion XL (Wellbutrin XL) 300 MG 24 hr tablet; Take 1 tablet by mouth Daily.  Dispense: 90 tablet; Refill: 0    3. Mild episode of recurrent major depressive disorder (HCC)  -     buPROPion XL (Wellbutrin XL) 300 MG 24 hr tablet; Take 1 tablet by mouth Daily.  Dispense: 90 tablet; Refill: 0            Visit Diagnoses:    ICD-10-CM ICD-9-CM   1. Attention deficit hyperactivity disorder (ADHD), predominantly inattentive type  F90.0 314.00   2. Generalized anxiety disorder  F41.1 300.02   3. Mild episode of recurrent major depressive disorder (HCC)  F33.0 296.31       GOALS:  Short Term Goals: Patient will be compliant with medication, and patient will have no significant medication related side effects.  Patient will be engaged in psychotherapy as indicated.  Patient will report subjective improvement of symptoms.  Long term goals: To stabilize mood and treat/improve subjective symptoms, the patient will stay out of the hospital, the patient will be at an optimal level of functioning, and the patient will take all medications as prescribed.  The patient verbalized understanding and agreement with goals that were mutually set.      SUICIDE RISK ASSESSMENT: Unalterable demographics and a history of mental health intervention indicate this patient is in a high risk category compared to the general population. At present, the patient denies active SI/HI, intentions, or plans at this time and agrees to seek immediate care should such thoughts develop. The patient verbalizes understanding of how to access emergency care if needed and agrees to do so. Consideration of suicide risk and  protective factors such as history, current presentation, individual strengths and weaknesses, psychosocial and environmental stressors and variables, psychiatric illness and symptoms, medical conditions and pain, took place in this interview. Based on those considerations, the patient is determined: within individual baseline and presenting no imminent risk for suicide or homicide. Other recommendations: The patient does not meet the criteria for inpatient admission and is not a safety risk to self or others at today's visit. Inpatient treatment offers no significant advantages over outpatient treatment for this patient at today's visit.      SAFETY PLAN:  Patient was given ample time for questions and fully participated in treatment planning.  Patient was encouraged to call the clinic with any questions or concerns.  Patient was informed of access to emergency care. If patient were to develop any significant symptomatology, suicidal ideation, homicidal ideation, any concerns, or feel unsafe at any time they are to call the clinic and if unable to get immediate assistance should immediately call 911 or go to the nearest emergency room.  The patient is advised to remove or secure (lock away) all lethal weapons (including guns) and sharps (including razors, scissors, knives, etc.).  All medications (including any prescribed and any over the counter medications) should be stored in a safe and secured location that is not obtainable by children/adolescents.  Patient was given an opportunity and encouraged to ask questions about their medication, illness, and treatment. Patient contracted verbally for the following: If you are experiencing an emotional crisis or have thoughts of harming yourself or others, please go to your nearest local emergency room or call 911. Will continue to re-assess medication response and side effects frequently to establish efficacy and ensure safety. Risks, any black box warnings, side  effects, off label usage, and benefits of medication and treatment discussed with patient, along with potential adverse side effects of current and/or newly prescribed medication, alternative treatment options, and OTC medications.  Patient verbalized understanding of potential risks, any off label use of medication, any black box warnings, and any side effects in their own words. The patient verbalized understanding and agreed to comply with the safety plan discussed in their own words.  Patient given the number to the office. Number also available to the 24- hour suicide hotline.      TREATMENT PLAN/GOALS: Continue medications and treatment plan as indicated. Treatment and medication options discussed during today's visit. Patient acknowledged and verbally consented to continue with current treatment plan and was educated on the importance of compliance with treatment and follow-up appointments.      -Continue Vyvanse 50 mg daily for ADHD. Discussed with patient that refills cannot be given on stimulant medications and it must be sent in monthly.  Discussed with the patient that since we will not be following-up for another visit for 3 months, they will have to request a refill for this medication prior to their next follow-up appointment.  Instructed patient to request a refill approximately 4 to 5 days before running out of medication via the [employer] laxmi or by calling the clinic to ensure that refill can be sent in an appropriate time.  Patient verbalizes understanding and endorses that they are agreeable to this.  -Continue Wellbutrin  mg daily for depression, anxiety, and ADHD  -Continue psychotherapy      MEDICATION ISSUES: Discussed medication options and treatment plan of prescribed medication, any off label use of medication, as well as the risks, benefits, any black box warnings including increased suicidality, and side effects including but not limited to potential falls, dizziness, possible  impaired driving, GI side effects (change in appetite, abdominal discomfort, nausea, vomiting, diarrhea, and/or constipation), dry mouth, somnolence, sedation, insomnia, activation, agitation, irritation, tremors, abnormal muscle movements or disorders, headache, sweating, possible bruising or rare bleeding, electrolyte and/or fluid abnormalities, change in blood pressure/heart rate/and or heart rhythm, sexual dysfunction, and metabolic adversities among others. Patient and/or guardian agreeable to call the office with any worsening of symptoms or onset of side effects, or if any concerns or questions arise.  The contact information for the office is made available to the patient and/or guardian.  Patient and/or guardian agreeable to call 911 or go to the nearest ER should they begin having any SI/HI, or if any urgent concerns arise. No medication side effects or related complaints today.    The patient is being prescribed a controlled substance as part of the treatment plan. The patient/guardian has been educated of appropriate use of the medication(s), including risks and side effects such as insomnia, headache, exacerbation of tics, nervousness, irritability, overstimulation, tremor, dizziness, anorexia or change in appetite, nausea, dry mouth, constipation, diarrhea, weight loss, sexual dysfunction, psychotic episodes, seizures, palpitations, tachycardia, hypertension, rare activation (activation of hypomania, chelsie, and/or suicidal ideations), cardiovascular adverse effects including sudden death (especially patients with pre-existing structural abnormalities often associated with a family history of cardiac disease), may slow normal growth in children, weight gain is reported but not expected, sedation is possible but activation is much more common, metabolic adversities, and overdose among others. Patient/guardian is also informed that the medication is to be used by the patient only, the medication is to be  used only as directed, and the medication should not be combined with other substances unless directed by a Provider/Prescriber. The patient/guardian verbalized understanding and agreement with this in their own words, and wish to continue with current treatment plan.    Without the prescribed medication for ADHD, it is reported that the patient has problems with attention and focus including being easily distracted, easily losing objects, trouble with time management, trouble completing tasks because of distractions, procrastination, indecisiveness, careless mistakes in daily activities/work/school, and not finishing jobs that are started.  Patient denies any side effects, no worsening of insomnia, and no worsening of anxiety on the medication dose.  Due to the reported problems without the medication usage, as well as the significant improvement in symptoms with the medication usage, the patient requests to remain on the prescribed medication for ADHD.          [facility] No Show Policy:  We understand unexpected circumstances arise; however, anytime you miss your appointment we are unable to provide you appropriate care.  In addition, each appointment missed could have been used to provide care for others.  We ask that you call at least 24 hours in advance to cancel or reschedule an appointment.  We would like to take this opportunity to remind you of our policy stating patients who miss THREE or more appointments without cancelling or rescheduling 24 hours in advance of the appointment may be subject to cancellation of any further visits with our clinic and recommendation to seek in-person services/visits.    Please call [phone] to reschedule your appointment. If there are reasons that make it difficult for you to keep the appointments, please call and let us know how we can help.  Please understand that medication prescribing will not continue without seeing your provider.       [facility]'s No Show Policy reviewed with patient at today's visit. Patient verbalized understanding of this policy. Discussed with patient that in the event that there are three or more no show visits, it will be recommended that they pursue in-person services/visits as noncompliance with telehealth visits indicates that patient is not an appropriate candidate for telemedicine and would likely be more appropriate for in-person services/visits. Patient verbalizes understanding and is agreeable to this.      MEDS ORDERED DURING VISIT:  New Medications Ordered This Visit   Medications   • buPROPion XL (Wellbutrin XL) 300 MG 24 hr tablet     Sig: Take 1 tablet by mouth Daily.     Dispense:  90 tablet     Refill:  0   • lisdexamfetamine (VYVANSE) 50 MG capsule     Sig: Take 1 capsule by mouth Every Morning     Dispense:  30 capsule     Refill:  0       Return in about 3 months (around 4/6/2022), or if symptoms worsen or fail to improve, for Recheck.       Patient will follow-up in 3 months, highly encouraged the patient if he had any questions or concerns to contact the behavioral health virtual clinic for sooner appointment patient verbalized understanding.      Functional Status: Mild impairment     Prognosis: Good with Ongoing Treatment             This document has been electronically signed by BETTY Sandy  January 6, 2022 13:23 EST    Part of this note may be an electronic transcription/translation of spoken language to printed text using the Dragon Dictation System.

## 2022-01-07 ENCOUNTER — APPOINTMENT (OUTPATIENT)
Dept: MRI IMAGING | Facility: HOSPITAL | Age: 37
End: 2022-01-07

## 2022-01-30 ENCOUNTER — HOSPITAL ENCOUNTER (OUTPATIENT)
Dept: MRI IMAGING | Facility: HOSPITAL | Age: 37
Discharge: HOME | End: 2022-01-30
Admitting: FAMILY MEDICINE

## 2022-01-30 DIAGNOSIS — M25.562 LEFT KNEE PAIN, UNSPECIFIED CHRONICITY: ICD-10-CM

## 2022-01-30 PROCEDURE — 73721 MRI JNT OF LWR EXTRE W/O DYE: CPT

## 2022-02-16 DIAGNOSIS — F90.0 ATTENTION DEFICIT HYPERACTIVITY DISORDER (ADHD), PREDOMINANTLY INATTENTIVE TYPE: Chronic | ICD-10-CM

## 2022-02-28 NOTE — TELEPHONE ENCOUNTER
STARTED PA ON PATIENTS VYVANSE , PT WAS MADE AWARE TO CONT ADDERALL UNTIL PA WAS APPROVED    behavioral health/cardiovascular

## 2022-03-09 ENCOUNTER — APPOINTMENT (OUTPATIENT)
Dept: GENERAL RADIOLOGY | Facility: HOSPITAL | Age: 37
End: 2022-03-09

## 2022-03-09 ENCOUNTER — HOSPITAL ENCOUNTER (EMERGENCY)
Facility: HOSPITAL | Age: 37
Discharge: HOME | End: 2022-03-09
Attending: EMERGENCY MEDICINE | Admitting: EMERGENCY MEDICINE

## 2022-03-09 VITALS
DIASTOLIC BLOOD PRESSURE: 72 MMHG | WEIGHT: 240 LBS | RESPIRATION RATE: 16 BRPM | OXYGEN SATURATION: 97 % | HEART RATE: 79 BPM | TEMPERATURE: 99.3 F | HEIGHT: 71 IN | BODY MASS INDEX: 33.6 KG/M2 | SYSTOLIC BLOOD PRESSURE: 118 MMHG

## 2022-03-09 DIAGNOSIS — R55 VASOVAGAL SYNCOPE: Primary | ICD-10-CM

## 2022-03-09 DIAGNOSIS — I95.1 ORTHOSTATIC HYPOTENSION: ICD-10-CM

## 2022-03-09 DIAGNOSIS — R11.10 VOMITING AND DIARRHEA: ICD-10-CM

## 2022-03-09 DIAGNOSIS — E86.0 DEHYDRATION: ICD-10-CM

## 2022-03-09 DIAGNOSIS — R19.7 VOMITING AND DIARRHEA: ICD-10-CM

## 2022-03-09 DIAGNOSIS — Z20.822 CLOSE EXPOSURE TO COVID-19 VIRUS: ICD-10-CM

## 2022-03-09 LAB
ALBUMIN SERPL-MCNC: 4.2 G/DL (ref 3.5–5.2)
ALBUMIN/GLOB SERPL: 1.2 G/DL
ALP SERPL-CCNC: 61 U/L (ref 39–117)
ALT SERPL W P-5'-P-CCNC: 41 U/L (ref 1–41)
ANION GAP SERPL CALCULATED.3IONS-SCNC: 13 MMOL/L (ref 5–15)
AST SERPL-CCNC: 23 U/L (ref 1–40)
BASOPHILS # BLD AUTO: 0.04 10*3/MM3 (ref 0–0.2)
BASOPHILS NFR BLD AUTO: 0.6 % (ref 0–1.5)
BILIRUB SERPL-MCNC: 0.3 MG/DL (ref 0–1.2)
BUN SERPL-MCNC: 16 MG/DL (ref 6–20)
BUN/CREAT SERPL: 14.7 (ref 7–25)
CALCIUM SPEC-SCNC: 8.7 MG/DL (ref 8.6–10.5)
CHLORIDE SERPL-SCNC: 104 MMOL/L (ref 98–107)
CO2 SERPL-SCNC: 20 MMOL/L (ref 22–29)
CREAT SERPL-MCNC: 1.09 MG/DL (ref 0.76–1.27)
DEPRECATED RDW RBC AUTO: 41.9 FL (ref 37–54)
EGFRCR SERPLBLD CKD-EPI 2021: 90.8 ML/MIN/1.73
EOSINOPHIL # BLD AUTO: 0.01 10*3/MM3 (ref 0–0.4)
EOSINOPHIL NFR BLD AUTO: 0.1 % (ref 0.3–6.2)
ERYTHROCYTE [DISTWIDTH] IN BLOOD BY AUTOMATED COUNT: 12.4 % (ref 12.3–15.4)
FLUAV SUBTYP SPEC NAA+PROBE: NOT DETECTED
FLUBV RNA ISLT QL NAA+PROBE: NOT DETECTED
GLOBULIN UR ELPH-MCNC: 3.5 GM/DL
GLUCOSE SERPL-MCNC: 154 MG/DL (ref 65–99)
HCT VFR BLD AUTO: 40.2 % (ref 37.5–51)
HGB BLD-MCNC: 13.5 G/DL (ref 13–17.7)
HOLD SPECIMEN: NORMAL
IMM GRANULOCYTES # BLD AUTO: 0.02 10*3/MM3 (ref 0–0.05)
IMM GRANULOCYTES NFR BLD AUTO: 0.3 % (ref 0–0.5)
LYMPHOCYTES # BLD AUTO: 0.79 10*3/MM3 (ref 0.7–3.1)
LYMPHOCYTES NFR BLD AUTO: 11 % (ref 19.6–45.3)
MCH RBC QN AUTO: 30.8 PG (ref 26.6–33)
MCHC RBC AUTO-ENTMCNC: 33.6 G/DL (ref 31.5–35.7)
MCV RBC AUTO: 91.6 FL (ref 79–97)
MONOCYTES # BLD AUTO: 0.49 10*3/MM3 (ref 0.1–0.9)
MONOCYTES NFR BLD AUTO: 6.9 % (ref 5–12)
NEUTROPHILS NFR BLD AUTO: 5.8 10*3/MM3 (ref 1.7–7)
NEUTROPHILS NFR BLD AUTO: 81.1 % (ref 42.7–76)
NRBC BLD AUTO-RTO: 0 /100 WBC (ref 0–0.2)
NT-PROBNP SERPL-MCNC: 17.1 PG/ML (ref 0–450)
PLATELET # BLD AUTO: 345 10*3/MM3 (ref 140–450)
PMV BLD AUTO: 10.9 FL (ref 6–12)
POTASSIUM SERPL-SCNC: 3.7 MMOL/L (ref 3.5–5.2)
PROT SERPL-MCNC: 7.7 G/DL (ref 6–8.5)
QT INTERVAL: 328 MS
QTC INTERVAL: 423 MS
RBC # BLD AUTO: 4.39 10*6/MM3 (ref 4.14–5.8)
SARS-COV-2 RNA PNL SPEC NAA+PROBE: NOT DETECTED
SODIUM SERPL-SCNC: 137 MMOL/L (ref 136–145)
TROPONIN T SERPL-MCNC: <0.01 NG/ML (ref 0–0.03)
WBC NRBC COR # BLD: 7.15 10*3/MM3 (ref 3.4–10.8)
WHOLE BLOOD HOLD SPECIMEN: NORMAL
WHOLE BLOOD HOLD SPECIMEN: NORMAL

## 2022-03-09 PROCEDURE — 71045 X-RAY EXAM CHEST 1 VIEW: CPT

## 2022-03-09 PROCEDURE — 87636 SARSCOV2 & INF A&B AMP PRB: CPT | Performed by: EMERGENCY MEDICINE

## 2022-03-09 PROCEDURE — 83880 ASSAY OF NATRIURETIC PEPTIDE: CPT | Performed by: EMERGENCY MEDICINE

## 2022-03-09 PROCEDURE — 84484 ASSAY OF TROPONIN QUANT: CPT | Performed by: EMERGENCY MEDICINE

## 2022-03-09 PROCEDURE — 99284 EMERGENCY DEPT VISIT MOD MDM: CPT

## 2022-03-09 PROCEDURE — 25010000002 ONDANSETRON PER 1 MG: Performed by: EMERGENCY MEDICINE

## 2022-03-09 PROCEDURE — 96375 TX/PRO/DX INJ NEW DRUG ADDON: CPT

## 2022-03-09 PROCEDURE — 36415 COLL VENOUS BLD VENIPUNCTURE: CPT

## 2022-03-09 PROCEDURE — 80053 COMPREHEN METABOLIC PANEL: CPT | Performed by: EMERGENCY MEDICINE

## 2022-03-09 PROCEDURE — 93005 ELECTROCARDIOGRAM TRACING: CPT | Performed by: EMERGENCY MEDICINE

## 2022-03-09 PROCEDURE — 96374 THER/PROPH/DIAG INJ IV PUSH: CPT

## 2022-03-09 PROCEDURE — 85025 COMPLETE CBC W/AUTO DIFF WBC: CPT | Performed by: EMERGENCY MEDICINE

## 2022-03-09 RX ORDER — ONDANSETRON 4 MG/1
4 TABLET, ORALLY DISINTEGRATING ORAL EVERY 4 HOURS
Qty: 12 TABLET | Refills: 0 | Status: SHIPPED | OUTPATIENT
Start: 2022-03-09 | End: 2022-04-22

## 2022-03-09 RX ORDER — FAMOTIDINE 10 MG/ML
20 INJECTION, SOLUTION INTRAVENOUS ONCE
Status: COMPLETED | OUTPATIENT
Start: 2022-03-09 | End: 2022-03-09

## 2022-03-09 RX ORDER — ONDANSETRON 2 MG/ML
4 INJECTION INTRAMUSCULAR; INTRAVENOUS ONCE
Status: COMPLETED | OUTPATIENT
Start: 2022-03-09 | End: 2022-03-09

## 2022-03-09 RX ORDER — SODIUM CHLORIDE 0.9 % (FLUSH) 0.9 %
10 SYRINGE (ML) INJECTION AS NEEDED
Status: DISCONTINUED | OUTPATIENT
Start: 2022-03-09 | End: 2022-03-10 | Stop reason: HOSPADM

## 2022-03-09 RX ADMIN — ONDANSETRON 4 MG: 2 INJECTION INTRAMUSCULAR; INTRAVENOUS at 19:52

## 2022-03-09 RX ADMIN — FAMOTIDINE 20 MG: 10 INJECTION, SOLUTION INTRAVENOUS at 19:52

## 2022-03-09 RX ADMIN — SODIUM CHLORIDE 1000 ML: 9 INJECTION, SOLUTION INTRAVENOUS at 19:52

## 2022-03-09 RX ADMIN — SODIUM CHLORIDE 1000 ML: 9 INJECTION, SOLUTION INTRAVENOUS at 21:47

## 2022-03-10 NOTE — DISCHARGE INSTRUCTIONS
Push fluids.  Liquid diet only for the next 24 hours then very slowly advance diet as tolerated.  Avoid dairy for now.    Utilize nausea medicines as prescribed.    If you develop increasing abdominal pain especially if it moves to your right side please return immediately.    Please review the medications you are supposed to be taking according to prior physician recommendations. I have not changed your home medications during this visit. If your discharge instructions indicate that I have changed your home medications, this is not the case, and you should disregard. If you have any questions about the medication you should be taking at home, please call your physician.

## 2022-03-10 NOTE — ED PROVIDER NOTES
EMERGENCY DEPARTMENT ENCOUNTER    Pt Name: Frank Champion  MRN: [id number]  Pt :   1985  Room Number:    Date of encounter:  3/9/2022  PCP: Magaly Kay MD  ED Provider: Jorge Castanon MD    Historian: patient      HPI:  Chief Complaint: illness        Context: Frank Champion is a 36 y.o. male who presents to the ED c/o multiple symptoms. Patient reports that he woke up this morning with diffuse myalgias, fatigue, a fever that has gotten as high as 102.6 taken orally, nausea, vomiting x1, and diarrhea x4. He has been drinking water and green tea all day in order to keep himself hydrated and has been treating the fever with ibuprofen last taken at 1800. While being seen at the [facility] he had a syncopal episode during which he slumped into a chair. He did not hit his head and reports that prior to losing consciousness he gradually became lightheaded. He was then brought to the ED by EMS. He denies shortness of breath, sore throat, and cough. There are no other acute complaints at this time. Patient has a hx of HTN treated with amlodipine and losartan.      PAST MEDICAL HISTORY  Past Medical History:   Diagnosis Date   • ADHD (attention deficit hyperactivity disorder)    • Anxiety    • Arthritis    • Depression    • HTN (hypertension)    • Hypertriglyceridemia 2020   • Wears glasses          PAST SURGICAL HISTORY  Past Surgical History:   Procedure Laterality Date   • VASECTOMY           FAMILY HISTORY  Family History   Problem Relation Age of Onset   • Mental illness Mother         bipolar vs depression   • Depression Mother    • Colon polyps Mother    • Arthritis Father    • Depression Father    • Hyperlipidemia Paternal Grandfather    • Hypertension Paternal Grandfather    • Heart attack Paternal Grandfather    • Stroke Paternal Grandfather    • Alcohol abuse Paternal Grandfather    • Alcohol abuse Paternal Grandmother    • Colon cancer Neg Hx    • Prostate cancer Neg Hx           SOCIAL HISTORY  Social History     Socioeconomic History   • Marital status:      Spouse name: Julienne Patel   Tobacco Use   • Smoking status: Former Smoker     Packs/day: 0.25     Years: 12.00     Pack years: 3.00     Types: Cigars, Cigarettes     Quit date: 2019     Years since quitting: 3.1   • Smokeless tobacco: Never Used   • Tobacco comment: never been a regular smoker, very little tobacco use. reports maybe 2 cigarettes per month.   Vaping Use   • Vaping Use: Never used   Substance and Sexual Activity   • Alcohol use: Yes     Alcohol/week: 3.0 - 4.0 standard drinks     Types: 3 - 4 Glasses of wine per week   • Drug use: Not Currently     Types: Marijuana   • Sexual activity: Defer         ALLERGIES  Guanfacine, Strattera [atomoxetine], and Sulfa antibiotics        REVIEW OF SYSTEMS  Review of Systems     All systems reviewed and negative except for those discussed in HPI.       PHYSICAL EXAM    I have reviewed the triage vital signs and nursing notes.    ED Triage Vitals   Temp Pulse Resp BP SpO2   -- -- -- -- --      Temp src Heart Rate Source Patient Position BP Location FiO2 (%)   -- -- -- -- --       Physical Exam  GENERAL:   Appears comfortable resting in bed. No acute distress  HENT: Nares patent.  Dry mucous membranes   EYES: No scleral icterus.  CV: Regular rhythm. Tachycardic   RESPIRATORY: Normal effort.  No audible wheezes, rales or rhonchi. Clear to auscultation bilaterally  ABDOMEN: Soft.  Mildly tender in the left upper and epigastric to palpation.  No guarding rebound or rigidity.  No paraspinal masses.  Absolutely no tenderness to deep palpation in the right lower quadrant/McBurney's point.  MUSCULOSKELETAL: No deformities.   NEURO: Alert, moves all extremities, follows commands.  SKIN: Warm, dry, no rash visualized.        LAB RESULTS  Recent Results (from the past 24 hour(s))   Comprehensive Metabolic Panel    Collection Time: 03/09/22  7:05 PM    Specimen: Blood   Result  Value Ref Range    Glucose 154 (H) 65 - 99 mg/dL    BUN 16 6 - 20 mg/dL    Creatinine 1.09 0.76 - 1.27 mg/dL    Sodium 137 136 - 145 mmol/L    Potassium 3.7 3.5 - 5.2 mmol/L    Chloride 104 98 - 107 mmol/L    CO2 20.0 (L) 22.0 - 29.0 mmol/L    Calcium 8.7 8.6 - 10.5 mg/dL    Total Protein 7.7 6.0 - 8.5 g/dL    Albumin 4.20 3.50 - 5.20 g/dL    ALT (SGPT) 41 1 - 41 U/L    AST (SGOT) 23 1 - 40 U/L    Alkaline Phosphatase 61 39 - 117 U/L    Total Bilirubin 0.3 0.0 - 1.2 mg/dL    Globulin 3.5 gm/dL    A/G Ratio 1.2 g/dL    BUN/Creatinine Ratio 14.7 7.0 - 25.0    Anion Gap 13.0 5.0 - 15.0 mmol/L    eGFR 90.8 >60.0 mL/min/1.73   BNP    Collection Time: 03/09/22  7:05 PM    Specimen: Blood   Result Value Ref Range    proBNP 17.1 0.0 - 450.0 pg/mL   Troponin    Collection Time: 03/09/22  7:05 PM    Specimen: Blood   Result Value Ref Range    Troponin T <0.010 0.000 - 0.030 ng/mL   Green Top (Gel)    Collection Time: 03/09/22  7:05 PM   Result Value Ref Range    Extra Tube Hold for add-ons.    Lavender Top    Collection Time: 03/09/22  7:05 PM   Result Value Ref Range    Extra Tube hold for add-on    Gold Top - SST    Collection Time: 03/09/22  7:05 PM   Result Value Ref Range    Extra Tube Hold for add-ons.    Gray Top    Collection Time: 03/09/22  7:05 PM   Result Value Ref Range    Extra Tube Hold for add-ons.    Light Blue Top    Collection Time: 03/09/22  7:05 PM   Result Value Ref Range    Extra Tube hold for add-on    CBC Auto Differential    Collection Time: 03/09/22  7:05 PM    Specimen: Blood   Result Value Ref Range    WBC 7.15 3.40 - 10.80 10*3/mm3    RBC 4.39 4.14 - 5.80 10*6/mm3    Hemoglobin 13.5 13.0 - 17.7 g/dL    Hematocrit 40.2 37.5 - 51.0 %    MCV 91.6 79.0 - 97.0 fL    MCH 30.8 26.6 - 33.0 pg    MCHC 33.6 31.5 - 35.7 g/dL    RDW 12.4 12.3 - 15.4 %    RDW-SD 41.9 37.0 - 54.0 fl    MPV 10.9 6.0 - 12.0 fL    Platelets 345 140 - 450 10*3/mm3    Neutrophil % 81.1 (H) 42.7 - 76.0 %    Lymphocyte % 11.0 (L) 19.6  - 45.3 %    Monocyte % 6.9 5.0 - 12.0 %    Eosinophil % 0.1 (L) 0.3 - 6.2 %    Basophil % 0.6 0.0 - 1.5 %    Immature Grans % 0.3 0.0 - 0.5 %    Neutrophils, Absolute 5.80 1.70 - 7.00 10*3/mm3    Lymphocytes, Absolute 0.79 0.70 - 3.10 10*3/mm3    Monocytes, Absolute 0.49 0.10 - 0.90 10*3/mm3    Eosinophils, Absolute 0.01 0.00 - 0.40 10*3/mm3    Basophils, Absolute 0.04 0.00 - 0.20 10*3/mm3    Immature Grans, Absolute 0.02 0.00 - 0.05 10*3/mm3    nRBC 0.0 0.0 - 0.2 /100 WBC   COVID-19 and FLU A/B PCR - Swab, Nasopharynx    Collection Time: 03/09/22  7:05 PM    Specimen: Nasopharynx; Swab   Result Value Ref Range    COVID19 Not Detected Not Detected - Ref. Range    Influenza A PCR Not Detected Not Detected    Influenza B PCR Not Detected Not Detected   ECG 12 Lead    Collection Time: 03/09/22  7:08 PM   Result Value Ref Range    QT Interval 328 ms    QTC Interval 423 ms       If labs were ordered, I independently reviewed the results.        RADIOLOGY  XR Chest 1 View    Result Date: 3/9/2022  DATE OF EXAM: 3/9/2022 7:05 PM  PROCEDURE: XR CHEST 1 VW-  INDICATIONS: SOA triage protocol  COMPARISON: No comparisons available.  TECHNIQUE: Single radiographic AP view of the chest was obtained.  FINDINGS: Slightly low lung volumes. Normal cardiomediastinal silhouette. No pleural effusion or pneumothorax. No focal consolidation. No acute osseous findings. Unremarkable appearing upper abdomen.      Slightly low lung volumes without acute cardiopulmonary findings.  This report was finalized on 3/9/2022 9:25 PM by Frank Smiley MD.        I ordered and reviewed the above noted radiographic studies.      I viewed images of chest x-ray which showed no active disease per my independent interpretation.    See radiologist's dictation for official interpretation.        PROCEDURES    Procedures    ECG 12 Lead   Final Result   Test Reason : SOA Protocol   Blood Pressure :   */*   mmHG   Vent. Rate : 100 BPM     Atrial Rate : 100 BPM       P-R Int : 172 ms          QRS Dur :  90 ms       QT Int : 328 ms       P-R-T Axes :  36   3  35 degrees      QTc Int : 423 ms      Normal sinus rhythm   Normal ECG   No previous ECGs available   Confirmed by JACOB KLEIN MD ([id number]) on 3/9/2022 11:26:02 PM      Referred By: EDMD           Confirmed By: JACOB KLEIN MD          MEDICATIONS GIVEN IN ER    Medications   sodium chloride 0.9 % flush 10 mL (has no administration in time range)   sodium chloride 0.9 % bolus 1,000 mL (0 mL Intravenous Stopped 3/9/22 2050)   ondansetron (ZOFRAN) injection 4 mg (4 mg Intravenous Given 3/9/22 1952)   famotidine (PEPCID) injection 20 mg (20 mg Intravenous Given 3/9/22 1952)   sodium chloride 0.9 % bolus 1,000 mL (0 mL Intravenous Stopped 3/9/22 2237)         PROGRESS, DATA ANALYSIS, CONSULTS, AND MEDICAL DECISION MAKING    All labs have been independently reviewed by me.  All radiology studies have been reviewed by me and the radiologist dictating the report.   EKG's have been independently viewed and interpreted by me.      Differential diagnoses: Viral gastroenteritis versus Covid infection versus colitis.  Syncope likely secondary to vagal reaction along with dehydration.      ED Course as of 03/09/22 2326   Wed Mar 09, 2022   2012 Orthostatics positive with drop in diastolic blood pressure as well as increase in heart rate.  We are hydrating with a liter of normal saline administering Pepcid and Zofran.  I discussed risks and benefits of CT scanning his abdomen and pelvis.  I indicated that it is premature to obtain a CT scan at this point and radiation exposures more dangerous than relatively low likelihood of more serious etiology.  The patient has very comfortable with this plan and understands the need to return immediately if his symptoms worsen.  He has absolutely no tenderness in the right lower quadrant therefore appendicitis is less likely.  Other etiologies considered as well. [MS]   2247 Feeling improved.   Patient is able to stand without lightheadedness.  He feels well enough to go home and follow-up on an outpatient basis with his PCP.  He understands the need to return immediately if worse.  I gave him other precautions including returning if he develops right-sided abdominal pain, etc. [MS]      ED Course User Index  [MS] Jorge Castanon MD             AS OF 23:26 EST VITALS:    BP - 118/72  HR - 79  TEMP - 99.3 °F (37.4 °C) (Oral)  O2 SATS - 97%                  DIAGNOSIS  Final diagnoses:   Vasovagal syncope   Vomiting and diarrhea   Orthostatic hypotension   Dehydration   Close exposure to COVID-19 virus         DISPOSITION  DISCHARGE    Patient discharged in stable condition.    Reviewed implications of results, diagnosis, meds, responsibility to follow up, warning signs and symptoms of possible worsening, potential complications and reasons to return to ER.    Patient/Family voiced understanding of above instructions.    Discussed plan for discharge, as there is no emergent indication for admission.  Pt/family is agreeable and understands need for follow up and possible repeat testing.  Pt/family is aware that discharge does not mean that nothing is wrong but that it indicates no emergency is currently present that requires admission and they must continue care with follow-up as given below or with a physician of their choice.     FOLLOW-UP  Magaly Kay MD  [address] [place] [address]  [phone]      NEXT AVAILABLE APPOINTMENT - RECHECK OF CONDITION    [facility] Emergency Department  [address] [facility] [address] [phone]    IF YOU HAVE ANY CONCERN OF WORSENING CONDITION         Medication List      New Prescriptions    ondansetron ODT 4 MG disintegrating tablet  Commonly known as: ZOFRAN-ODT  Place 1 tablet on the tongue Every 4 (Four) Hours. As needed for nausea           Where to Get Your Medications      [facility] [address] [place] [address], [place] - [address] [facility] - [phone]  - [phone] FX  [address] [facility] [address]    Phone: [phone]   · ondansetron ODT 4 MG disintegrating tablet                  Jorge Castanon MD  03/09/22 8679

## 2022-03-28 DIAGNOSIS — F90.0 ATTENTION DEFICIT HYPERACTIVITY DISORDER (ADHD), PREDOMINANTLY INATTENTIVE TYPE: Chronic | ICD-10-CM

## 2022-03-31 ENCOUNTER — TELEMEDICINE (OUTPATIENT)
Dept: PSYCHIATRY | Facility: CLINIC | Age: 37
End: 2022-03-31

## 2022-03-31 DIAGNOSIS — F41.1 GENERALIZED ANXIETY DISORDER: Chronic | ICD-10-CM

## 2022-03-31 DIAGNOSIS — F90.0 ATTENTION DEFICIT HYPERACTIVITY DISORDER (ADHD), PREDOMINANTLY INATTENTIVE TYPE: Primary | Chronic | ICD-10-CM

## 2022-03-31 DIAGNOSIS — F33.0 MILD EPISODE OF RECURRENT MAJOR DEPRESSIVE DISORDER: Chronic | ICD-10-CM

## 2022-03-31 PROCEDURE — 99214 OFFICE O/P EST MOD 30 MIN: CPT | Performed by: NURSE PRACTITIONER

## 2022-03-31 RX ORDER — BUPROPION HYDROCHLORIDE 300 MG/1
300 TABLET ORAL DAILY
Qty: 30 TABLET | Refills: 0 | Status: SHIPPED | OUTPATIENT
Start: 2022-03-31 | End: 2022-06-14

## 2022-03-31 NOTE — PROGRESS NOTES
"This provider is located at the [facility] (through [facility]), [address], [facility], [address] using a secure [employer] Video Visit through [employer]. Patient is being seen remotely via telehealth at their home address in [place], and stated they are in a secure environment for this session. The patient's condition being diagnosed/treated is appropriate for telemedicine. The provider identified herself as well as her credentials. The patient, and/or patients guardian, consent to be seen remotely, and when consent is given they understand that the consent allows for patient identifiable information to be sent to a third party as needed. They may refuse to be seen remotely at any time. The electronic data is encrypted and password protected, and the patient and/or guardian has been advised of the potential risks to privacy not withstanding such measures.    You have chosen to receive care through a telehealth visit.  Do you consent to use a video/audio connection for your medical care today? Yes     Subjective   Frank Champion is a 36 y.o. male who is here today for medication management follow up.    Chief Complaint: Depression, anxiety, ADHD    History of Present Illness:  The patient describes his mood as \"good\" over the last few weeks.  Patient states that overall he has been doing really well over the past few weeks.  The patient states that his only concern is that the Vyvanse does not seem to be working as well as it used to her current dose.  The patient states that ADHD symptoms were still much more well-controlled than they were prior to beginning Vyvanse, but states that over the past 1-1.5 months he has noticed a progressive worsening of ADHD symptoms.  Patient endorses he is interested in increasing his Vyvanse at this time for further management of ADHD symptoms.  The patient reports his appetite as good.  The patient reports his sleep as fluctuating.  The " patient states that he has been having some trouble sleeping recently.  He states that he has been sick recently and coughing has been causing him to wake up in the middle of the night.  He states that prior to getting sick he was sleeping well.  The patient denies any nightmares or bad dreams.  The patient reports compliance with current medication regimen.  The patient denies any current side effects from his current medication regimen.  The patient denies any abnormal muscle movements or tics.  The patient rates his ADHD at a 6/10 on a 0-10 scale, with 10 being the worst.  The patient rates his depression at a 2/10 on a 0-10 scale, with 10 being the worst.  The patient rates his anxiety at a 3/10 on a 0-10 scale, with 10 being the worst.  The patient rates hopelessness at a 0/10 on a 0-10 scale with 10 being the worst.  The patient denies suicidal ideations and homicidal ideations, and is convincing.  The patient denies any auditory hallucinations or visual hallucinations.  The patient does not endorse significant symptoms consistent with bipolar disorder or a psychotic illness.                  Prior Psychiatric Medications:  Tenex 1 mg nightly. States that this medication caused excessive sedation and daytime drowsiness.   Strattera 40 mg daily. States this medications was effective for ADHD, but caused severe urinary and sexual side effects that he could not tolerate.  Reports that these side effects began 2-3 weeks after beginning Straterra and stopped about 1 week after discontinuing it.   Adderall XR 15 mg daily - only mildly effective for ADHD  Adderall XR 20 mg daily - significant increase in anxiety and irritability with no improvement in focus and concentration from the 15 mg   Concerta 36 and 54 mg daily - ineffective      The following portions of the patient's history were reviewed and updated as appropriate: allergies, current medications, past family history, past medical history, past social  history, past surgical history and problem list.    Review of Systems   Constitutional: Negative for activity change, appetite change, fatigue and unexpected weight change.   Psychiatric/Behavioral: Positive for decreased concentration, dysphoric mood and sleep disturbance. Negative for agitation, behavioral problems, confusion, hallucinations, self-injury and suicidal ideas. The patient is nervous/anxious. The patient is not hyperactive.        Objective   Physical Exam  Constitutional:       Appearance: Normal appearance.   Neurological:      Mental Status: He is alert.   Psychiatric:         Attention and Perception: Perception normal. He is inattentive.         Mood and Affect: Mood and affect normal.         Speech: Speech normal.         Behavior: Behavior normal. Behavior is cooperative.         Thought Content: Thought content normal. Thought content does not include homicidal or suicidal ideation. Thought content does not include homicidal or suicidal plan.         Cognition and Memory: Cognition and memory normal.         Judgment: Judgment normal.       There were no vitals taken for this visit.    The patient was seen remotely today via a MyChart Video Visit through [facility] [place] [facility].  Unable to obtain vital signs due to nature of remote visit.  Height stated at 71.5 inches.  Weight stated at 240 pounds.     Allergies   Allergen Reactions   • Guanfacine Other (See Comments)     sedation   • Strattera [Atomoxetine] Other (See Comments)     Erectile dysfunction, difficulty urinating, urinary incontinence   • Sulfa Antibiotics GI Intolerance       Recent Results (from the past 2016 hour(s))   Comprehensive Metabolic Panel    Collection Time: 03/09/22  7:05 PM    Specimen: Blood   Result Value Ref Range    Glucose 154 (H) 65 - 99 mg/dL    BUN 16 6 - 20 mg/dL    Creatinine 1.09 0.76 - 1.27 mg/dL    Sodium 137 136 - 145 mmol/L    Potassium 3.7 3.5 - 5.2 mmol/L    Chloride 104 98 - 107 mmol/L    CO2 20.0 (L) 22.0 - 29.0  mmol/L    Calcium 8.7 8.6 - 10.5 mg/dL    Total Protein 7.7 6.0 - 8.5 g/dL    Albumin 4.20 3.50 - 5.20 g/dL    ALT (SGPT) 41 1 - 41 U/L    AST (SGOT) 23 1 - 40 U/L    Alkaline Phosphatase 61 39 - 117 U/L    Total Bilirubin 0.3 0.0 - 1.2 mg/dL    Globulin 3.5 gm/dL    A/G Ratio 1.2 g/dL    BUN/Creatinine Ratio 14.7 7.0 - 25.0    Anion Gap 13.0 5.0 - 15.0 mmol/L    eGFR 90.8 >60.0 mL/min/1.73   BNP    Collection Time: 03/09/22  7:05 PM    Specimen: Blood   Result Value Ref Range    proBNP 17.1 0.0 - 450.0 pg/mL   Troponin    Collection Time: 03/09/22  7:05 PM    Specimen: Blood   Result Value Ref Range    Troponin T <0.010 0.000 - 0.030 ng/mL   Green Top (Gel)    Collection Time: 03/09/22  7:05 PM   Result Value Ref Range    Extra Tube Hold for add-ons.    Lavender Top    Collection Time: 03/09/22  7:05 PM   Result Value Ref Range    Extra Tube hold for add-on    Gold Top - SST    Collection Time: 03/09/22  7:05 PM   Result Value Ref Range    Extra Tube Hold for add-ons.    Gray Top    Collection Time: 03/09/22  7:05 PM   Result Value Ref Range    Extra Tube Hold for add-ons.    Light Blue Top    Collection Time: 03/09/22  7:05 PM   Result Value Ref Range    Extra Tube hold for add-on    CBC Auto Differential    Collection Time: 03/09/22  7:05 PM    Specimen: Blood   Result Value Ref Range    WBC 7.15 3.40 - 10.80 10*3/mm3    RBC 4.39 4.14 - 5.80 10*6/mm3    Hemoglobin 13.5 13.0 - 17.7 g/dL    Hematocrit 40.2 37.5 - 51.0 %    MCV 91.6 79.0 - 97.0 fL    MCH 30.8 26.6 - 33.0 pg    MCHC 33.6 31.5 - 35.7 g/dL    RDW 12.4 12.3 - 15.4 %    RDW-SD 41.9 37.0 - 54.0 fl    MPV 10.9 6.0 - 12.0 fL    Platelets 345 140 - 450 10*3/mm3    Neutrophil % 81.1 (H) 42.7 - 76.0 %    Lymphocyte % 11.0 (L) 19.6 - 45.3 %    Monocyte % 6.9 5.0 - 12.0 %    Eosinophil % 0.1 (L) 0.3 - 6.2 %    Basophil % 0.6 0.0 - 1.5 %    Immature Grans % 0.3 0.0 - 0.5 %    Neutrophils, Absolute 5.80 1.70 - 7.00 10*3/mm3    Lymphocytes, Absolute 0.79 0.70 -  3.10 10*3/mm3    Monocytes, Absolute 0.49 0.10 - 0.90 10*3/mm3    Eosinophils, Absolute 0.01 0.00 - 0.40 10*3/mm3    Basophils, Absolute 0.04 0.00 - 0.20 10*3/mm3    Immature Grans, Absolute 0.02 0.00 - 0.05 10*3/mm3    nRBC 0.0 0.0 - 0.2 /100 WBC   COVID-19 and FLU A/B PCR - Swab, Nasopharynx    Collection Time: 03/09/22  7:05 PM    Specimen: Nasopharynx; Swab   Result Value Ref Range    COVID19 Not Detected Not Detected - Ref. Range    Influenza A PCR Not Detected Not Detected    Influenza B PCR Not Detected Not Detected   ECG 12 Lead    Collection Time: 03/09/22  7:08 PM   Result Value Ref Range    QT Interval 328 ms    QTC Interval 423 ms       Current Medications:   Current Outpatient Medications   Medication Sig Dispense Refill   • amLODIPine (NORVASC) 2.5 MG tablet Take 1 tablet by mouth Every Night. 90 tablet 1   • buPROPion XL (Wellbutrin XL) 300 MG 24 hr tablet Take 1 tablet by mouth Daily. 30 tablet 0   • lisdexamfetamine (VYVANSE) 60 MG capsule Take 1 capsule by mouth Every Morning 30 capsule 0   • losartan (COZAAR) 100 MG tablet Take 1 tablet by mouth Daily. 90 tablet 1   • meloxicam (MOBIC) 15 MG tablet Take 1 tablet by mouth Daily As Needed for Moderate Pain . 30 tablet 3   • ondansetron ODT (ZOFRAN-ODT) 4 MG disintegrating tablet Place 1 tablet on the tongue Every 4 (Four) Hours. As needed for nausea 12 tablet 0     No current facility-administered medications for this visit.       Mental Status Exam:   Hygiene:   good  Cooperation:  Cooperative  Eye Contact:  Good  Psychomotor Behavior:  Appropriate  Affect:  Full range  Hopelessness: Denies  Speech:  Normal  Thought Process:  Goal directed  Thought Content:  Normal  Suicidal:  None  Homicidal:  None  Hallucinations:  None  Delusion:  None  Memory:  Intact  Orientation:  Person, Place, Time and Situation  Reliability:  good  Insight:  Good  Judgement:  Good  Impulse Control:  Good  Physical/Medical Issues:  Yes See medical history         The ROSA  report, request number [id number], of the past 12 months were reviewed and is appropriate.  The patient/guardian reports taking the medication only as prescribed.  The patient/guardian denies any abuse or misuse of the medication.  The patient/guardian denies any other substance use or issues.  There are no apparent substance related issues.  The patient reports no side effects of the current medication usage.  The patient/guardian has reported significant improvement with medication usage and wishes to continue medication as prescribed.  The patient/guardian is appropriate to continue with current medication usage at this time.  Reinforced risks and side effects of medication usage, patient and/or guardian verbalize understanding in their own words and are in agreement with current plan.        Assessment/Plan   Diagnoses and all orders for this visit:    1. Attention deficit hyperactivity disorder (ADHD), predominantly inattentive type (Primary)  -     lisdexamfetamine (VYVANSE) 60 MG capsule; Take 1 capsule by mouth Every Morning  Dispense: 30 capsule; Refill: 0    2. Generalized anxiety disorder  -     buPROPion XL (Wellbutrin XL) 300 MG 24 hr tablet; Take 1 tablet by mouth Daily.  Dispense: 30 tablet; Refill: 0    3. Mild episode of recurrent major depressive disorder (HCC)  -     buPROPion XL (Wellbutrin XL) 300 MG 24 hr tablet; Take 1 tablet by mouth Daily.  Dispense: 30 tablet; Refill: 0            Visit Diagnoses:    ICD-10-CM ICD-9-CM   1. Attention deficit hyperactivity disorder (ADHD), predominantly inattentive type  F90.0 314.00   2. Generalized anxiety disorder  F41.1 300.02   3. Mild episode of recurrent major depressive disorder (HCC)  F33.0 296.31       GOALS:  Short Term Goals: Patient will be compliant with medication, and patient will have no significant medication related side effects.  Patient will be engaged in psychotherapy as indicated.  Patient will report subjective improvement of  symptoms.  Long term goals: To stabilize mood and treat/improve subjective symptoms, the patient will stay out of the hospital, the patient will be at an optimal level of functioning, and the patient will take all medications as prescribed.  The patient verbalized understanding and agreement with goals that were mutually set.      SUICIDE RISK ASSESSMENT: Unalterable demographics and a history of mental health intervention indicate this patient is in a high risk category compared to the general population. At present, the patient denies active SI/HI, intentions, or plans at this time and agrees to seek immediate care should such thoughts develop. The patient verbalizes understanding of how to access emergency care if needed and agrees to do so. Consideration of suicide risk and protective factors such as history, current presentation, individual strengths and weaknesses, psychosocial and environmental stressors and variables, psychiatric illness and symptoms, medical conditions and pain, took place in this interview. Based on those considerations, the patient is determined: within individual baseline and presenting no imminent risk for suicide or homicide. Other recommendations: The patient does not meet the criteria for inpatient admission and is not a safety risk to self or others at today's visit. Inpatient treatment offers no significant advantages over outpatient treatment for this patient at today's visit.      SAFETY PLAN:  Patient was given ample time for questions and fully participated in treatment planning.  Patient was encouraged to call the clinic with any questions or concerns.  Patient was informed of access to emergency care. If patient were to develop any significant symptomatology, suicidal ideation, homicidal ideation, any concerns, or feel unsafe at any time they are to call the clinic and if unable to get immediate assistance should immediately call 911 or go to the nearest emergency room.  The  patient is advised to remove or secure (lock away) all lethal weapons (including guns) and sharps (including razors, scissors, knives, etc.).  All medications (including any prescribed and any over the counter medications) should be stored in a safe and secured location that is not obtainable by children/adolescents.  Patient was given an opportunity and encouraged to ask questions about their medication, illness, and treatment. Patient contracted verbally for the following: If you are experiencing an emotional crisis or have thoughts of harming yourself or others, please go to your nearest local emergency room or call 911. Will continue to re-assess medication response and side effects frequently to establish efficacy and ensure safety. Risks, any black box warnings, side effects, off label usage, and benefits of medication and treatment discussed with patient, along with potential adverse side effects of current and/or newly prescribed medication, alternative treatment options, and OTC medications.  Patient verbalized understanding of potential risks, any off label use of medication, any black box warnings, and any side effects in their own words. The patient verbalized understanding and agreed to comply with the safety plan discussed in their own words.  Patient given the number to the office. Number also available to the 24- hour suicide hotline.      TREATMENT PLAN/GOALS: Continue medications and treatment plan as indicated. Treatment and medication options discussed during today's visit. Patient acknowledged and verbally consented to continue with current treatment plan and was educated on the importance of compliance with treatment and follow-up appointments.        -Increase Vyvanse to 60 mg daily for ADHD  -Continue Wellbutrin  mg daily for depression, anxiety, and ADHD        MEDICATION ISSUES: Discussed medication options and treatment plan of prescribed medication, any off label use of medication,  as well as the risks, benefits, any black box warnings including increased suicidality, and side effects including but not limited to potential falls, dizziness, possible impaired driving, GI side effects (change in appetite, abdominal discomfort, nausea, vomiting, diarrhea, and/or constipation), dry mouth, somnolence, sedation, insomnia, activation, agitation, irritation, tremors, abnormal muscle movements or disorders, headache, sweating, possible bruising or rare bleeding, electrolyte and/or fluid abnormalities, change in blood pressure/heart rate/and or heart rhythm, sexual dysfunction, and metabolic adversities among others. Patient and/or guardian agreeable to call the office with any worsening of symptoms or onset of side effects, or if any concerns or questions arise.  The contact information for the office is made available to the patient and/or guardian.  Patient and/or guardian agreeable to call 911 or go to the nearest ER should they begin having any SI/HI, or if any urgent concerns arise. No medication side effects or related complaints today.    The patient is being prescribed a controlled substance as part of the treatment plan. The patient/guardian has been educated of appropriate use of the medication(s), including risks and side effects such as insomnia, headache, exacerbation of tics, nervousness, irritability, overstimulation, tremor, dizziness, anorexia or change in appetite, nausea, dry mouth, constipation, diarrhea, weight loss, sexual dysfunction, psychotic episodes, seizures, palpitations, tachycardia, hypertension, rare activation (activation of hypomania, chelsie, and/or suicidal ideations), cardiovascular adverse effects including sudden death (especially patients with pre-existing structural abnormalities often associated with a family history of cardiac disease), may slow normal growth in children, weight gain is reported but not expected, sedation is possible but activation is much more  common, metabolic adversities, and overdose among others. Patient/guardian is also informed that the medication is to be used by the patient only, the medication is to be used only as directed, and the medication should not be combined with other substances unless directed by a Provider/Prescriber. The patient/guardian verbalized understanding and agreement with this in their own words, and wish to continue with current treatment plan.    Without the prescribed medication for ADHD, it is reported that the patient has problems with attention and focus including being easily distracted, easily losing objects, trouble with time management, trouble completing tasks because of distractions, procrastination, indecisiveness, careless mistakes in daily activities/work/school, and not finishing jobs that are started.  Patient denies any side effects, no worsening of insomnia, and no worsening of anxiety on the medication dose.  Due to the reported problems without the medication usage, as well as the significant improvement in symptoms with the medication usage, the patient requests to remain on the prescribed medication for ADHD.                    [facility] No Show Policy:  We understand unexpected circumstances arise; however, anytime you miss your appointment we are unable to provide you appropriate care.  In addition, each appointment missed could have been used to provide care for others.  We ask that you call at least 24 hours in advance to cancel or reschedule an appointment.  We would like to take this opportunity to remind you of our policy stating patients who miss THREE or more appointments without cancelling or rescheduling 24 hours in advance of the appointment may be subject to cancellation of any further visits with our clinic and recommendation to seek in-person services/visits.    Please call [phone] to reschedule your appointment. If there are reasons that make it difficult  for you to keep the appointments, please call and let us know how we can help.  Please understand that medication prescribing will not continue without seeing your provider.      [facility]'s No Show Policy reviewed with patient at today's visit. Patient verbalized understanding of this policy. Discussed with patient that in the event that there are three or more no show visits, it will be recommended that they pursue in-person services/visits as noncompliance with telehealth visits indicates that patient is not an appropriate candidate for telemedicine and would likely be more appropriate for in-person services/visits. Patient verbalizes understanding and is agreeable to this.      MEDS ORDERED DURING VISIT:  New Medications Ordered This Visit   Medications   • buPROPion XL (Wellbutrin XL) 300 MG 24 hr tablet     Sig: Take 1 tablet by mouth Daily.     Dispense:  30 tablet     Refill:  0   • lisdexamfetamine (VYVANSE) 60 MG capsule     Sig: Take 1 capsule by mouth Every Morning     Dispense:  30 capsule     Refill:  0       Return in about 4 weeks (around 4/28/2022), or if symptoms worsen or fail to improve, for Recheck.       Patient will follow-up in 4 weeks, highly encouraged the patient if he had any questions or concerns to contact the behavioral health virtual clinic for sooner appointment patient verbalized understanding.      Functional Status: Moderate impairment     Prognosis: Fair with Ongoing Treatment             This document has been electronically signed by BETTY Sandy  March 31, 2022 16:51 EDT       Some of the data in this electronic note has been brought forward from a previous encounter, any necessary changes have been made, it has been reviewed by this APRN, and it is accurate.      Part of this note may be an electronic transcription/translation of spoken language to printed text using the Dragon Dictation System.

## 2022-04-22 ENCOUNTER — OFFICE VISIT (OUTPATIENT)
Dept: FAMILY MEDICINE CLINIC | Facility: CLINIC | Age: 37
End: 2022-04-22

## 2022-04-22 ENCOUNTER — LAB (OUTPATIENT)
Dept: LAB | Facility: HOSPITAL | Age: 37
End: 2022-04-22

## 2022-04-22 VITALS
HEIGHT: 71 IN | SYSTOLIC BLOOD PRESSURE: 122 MMHG | HEART RATE: 78 BPM | DIASTOLIC BLOOD PRESSURE: 78 MMHG | BODY MASS INDEX: 34.44 KG/M2 | WEIGHT: 246 LBS | OXYGEN SATURATION: 97 %

## 2022-04-22 DIAGNOSIS — E78.1 HYPERTRIGLYCERIDEMIA: Chronic | ICD-10-CM

## 2022-04-22 DIAGNOSIS — R79.89 LOW TESTOSTERONE: Chronic | ICD-10-CM

## 2022-04-22 DIAGNOSIS — R39.12 WEAK URINARY STREAM: ICD-10-CM

## 2022-04-22 DIAGNOSIS — Z00.00 WELL ADULT EXAM: Primary | ICD-10-CM

## 2022-04-22 DIAGNOSIS — I10 ESSENTIAL HYPERTENSION: Chronic | ICD-10-CM

## 2022-04-22 DIAGNOSIS — Z00.00 WELL ADULT EXAM: ICD-10-CM

## 2022-04-22 LAB
ALBUMIN SERPL-MCNC: 4.5 G/DL (ref 3.5–5.2)
ALBUMIN/GLOB SERPL: 1.4 G/DL
ALP SERPL-CCNC: 59 U/L (ref 39–117)
ALT SERPL W P-5'-P-CCNC: 31 U/L (ref 1–41)
ANION GAP SERPL CALCULATED.3IONS-SCNC: 10.4 MMOL/L (ref 5–15)
AST SERPL-CCNC: 20 U/L (ref 1–40)
BACTERIA UR QL AUTO: ABNORMAL /HPF
BILIRUB SERPL-MCNC: 0.3 MG/DL (ref 0–1.2)
BILIRUB UR QL STRIP: NEGATIVE
BUN SERPL-MCNC: 13 MG/DL (ref 6–20)
BUN/CREAT SERPL: 13.5 (ref 7–25)
CALCIUM SPEC-SCNC: 9.4 MG/DL (ref 8.6–10.5)
CHLORIDE SERPL-SCNC: 105 MMOL/L (ref 98–107)
CHOLEST SERPL-MCNC: 191 MG/DL (ref 0–200)
CLARITY UR: CLEAR
CO2 SERPL-SCNC: 24.6 MMOL/L (ref 22–29)
COLOR UR: YELLOW
CREAT SERPL-MCNC: 0.96 MG/DL (ref 0.76–1.27)
DEPRECATED RDW RBC AUTO: 41.3 FL (ref 37–54)
EGFRCR SERPLBLD CKD-EPI 2021: 105.7 ML/MIN/1.73
ERYTHROCYTE [DISTWIDTH] IN BLOOD BY AUTOMATED COUNT: 12.6 % (ref 12.3–15.4)
GLOBULIN UR ELPH-MCNC: 3.2 GM/DL
GLUCOSE SERPL-MCNC: 88 MG/DL (ref 65–99)
GLUCOSE UR STRIP-MCNC: NEGATIVE MG/DL
HCT VFR BLD AUTO: 38.6 % (ref 37.5–51)
HDLC SERPL-MCNC: 32 MG/DL (ref 40–60)
HGB BLD-MCNC: 12.8 G/DL (ref 13–17.7)
HGB UR QL STRIP.AUTO: NEGATIVE
HYALINE CASTS UR QL AUTO: ABNORMAL /LPF
KETONES UR QL STRIP: ABNORMAL
LDLC SERPL CALC-MCNC: 129 MG/DL (ref 0–100)
LDLC/HDLC SERPL: 3.93 {RATIO}
LEUKOCYTE ESTERASE UR QL STRIP.AUTO: ABNORMAL
MCH RBC QN AUTO: 29.8 PG (ref 26.6–33)
MCHC RBC AUTO-ENTMCNC: 33.2 G/DL (ref 31.5–35.7)
MCV RBC AUTO: 90 FL (ref 79–97)
NITRITE UR QL STRIP: NEGATIVE
PH UR STRIP.AUTO: 5.5 [PH] (ref 5–8)
PLATELET # BLD AUTO: 397 10*3/MM3 (ref 140–450)
PMV BLD AUTO: 11.8 FL (ref 6–12)
POTASSIUM SERPL-SCNC: 4 MMOL/L (ref 3.5–5.2)
PROT SERPL-MCNC: 7.7 G/DL (ref 6–8.5)
PROT UR QL STRIP: NEGATIVE
PSA SERPL-MCNC: 0.63 NG/ML (ref 0–4)
RBC # BLD AUTO: 4.29 10*6/MM3 (ref 4.14–5.8)
RBC # UR STRIP: ABNORMAL /HPF
REF LAB TEST METHOD: ABNORMAL
SODIUM SERPL-SCNC: 140 MMOL/L (ref 136–145)
SP GR UR STRIP: 1.03 (ref 1–1.03)
SQUAMOUS #/AREA URNS HPF: ABNORMAL /HPF
TESTOST SERPL-MCNC: 315 NG/DL (ref 249–836)
TRIGL SERPL-MCNC: 167 MG/DL (ref 0–150)
TSH SERPL DL<=0.05 MIU/L-ACNC: 1.2 UIU/ML (ref 0.27–4.2)
UROBILINOGEN UR QL STRIP: ABNORMAL
VLDLC SERPL-MCNC: 30 MG/DL (ref 5–40)
WBC # UR STRIP: ABNORMAL /HPF
WBC NRBC COR # BLD: 7.22 10*3/MM3 (ref 3.4–10.8)

## 2022-04-22 PROCEDURE — 81001 URINALYSIS AUTO W/SCOPE: CPT

## 2022-04-22 PROCEDURE — 84443 ASSAY THYROID STIM HORMONE: CPT

## 2022-04-22 PROCEDURE — 84403 ASSAY OF TOTAL TESTOSTERONE: CPT

## 2022-04-22 PROCEDURE — 80053 COMPREHEN METABOLIC PANEL: CPT

## 2022-04-22 PROCEDURE — 99395 PREV VISIT EST AGE 18-39: CPT | Performed by: FAMILY MEDICINE

## 2022-04-22 PROCEDURE — 80061 LIPID PANEL: CPT

## 2022-04-22 PROCEDURE — 85027 COMPLETE CBC AUTOMATED: CPT

## 2022-04-22 PROCEDURE — 36415 COLL VENOUS BLD VENIPUNCTURE: CPT

## 2022-04-22 PROCEDURE — 84153 ASSAY OF PSA TOTAL: CPT

## 2022-04-22 RX ORDER — LOSARTAN POTASSIUM 100 MG/1
100 TABLET ORAL DAILY
Qty: 90 TABLET | Refills: 1 | Status: SHIPPED | OUTPATIENT
Start: 2022-04-22 | End: 2022-08-05 | Stop reason: SDUPTHER

## 2022-04-22 RX ORDER — AMLODIPINE BESYLATE 2.5 MG/1
2.5 TABLET ORAL NIGHTLY
Qty: 90 TABLET | Refills: 1 | Status: SHIPPED | OUTPATIENT
Start: 2022-04-22 | End: 2022-08-05 | Stop reason: SDUPTHER

## 2022-04-22 NOTE — PROGRESS NOTES
"Chief Complaint  Annual Exam and Urinary Retention (Slow flow)    Subjective          Frnak Champion presents to [facility] for   History of Present Illness     He has decided not to reverse vasectomy or have a child. Interested in restarting testosterone therapy.   His urine flow is not continuous, weaker for 3 months or so. He has to force and weaker stream. He has pressure in perineum. No nocturia.      He had a stomach virus and was dehydrated. He was evaluated at , while BP checked he passed out and was taken to ED. Treated with IVF.     He has been eating out less and has also decreased his portion size.  He relates that taking his bupropion and Vyvanse for ADHD has decreased his appetite.          Objective   Vital Signs:   Vitals:    04/22/22 0947   BP: 122/78   Pulse: 78   SpO2: 97%   Weight: 112 kg (246 lb)   Height: 180.3 cm (70.98\")     Body mass index is 34.33 kg/m².    BMI is above normal parameters. Recommendations: nutrition counseling/recommendations           Physical Exam  Constitutional:       General: He is not in acute distress.     Appearance: He is obese.   HENT:      Right Ear: Tympanic membrane and ear canal normal.      Left Ear: Tympanic membrane and ear canal normal.   Eyes:      General:         Right eye: No discharge.         Left eye: No discharge.      Conjunctiva/sclera: Conjunctivae normal.   Neck:      Thyroid: No thyromegaly.   Cardiovascular:      Rate and Rhythm: Normal rate and regular rhythm.   Pulmonary:      Effort: Pulmonary effort is normal.      Breath sounds: Normal breath sounds.   Abdominal:      Palpations: Abdomen is soft.      Tenderness: There is no abdominal tenderness.   Musculoskeletal:      Cervical back: Neck supple.      Right lower leg: No edema.      Left lower leg: No edema.   Lymphadenopathy:      Head:      Right side of head: No submandibular, preauricular or posterior auricular adenopathy.      Left side of head: No " submandibular, preauricular or posterior auricular adenopathy.      Cervical: No cervical adenopathy.   Skin:     General: Skin is warm.   Neurological:      Mental Status: He is alert and oriented to person, place, and time.   Psychiatric:         Mood and Affect: Mood normal.         Behavior: Behavior normal.         Thought Content: Thought content normal.         Judgment: Judgment normal.        Result Review :                Immunization History   Administered Date(s) Administered   • COVID-19 (MODERNA) 1st, 2nd, 3rd Dose Only 02/12/2021, 03/19/2021, 11/03/2021   • FluLaval/Fluarix/Fluzone >6 09/23/2020   • Hepatitis B 07/01/1998, 08/05/1998, 12/16/1998   • Influenza, Unspecified 08/15/2019   • MMR 07/01/1998, 11/29/2021   • Td 12/16/1998   • Tdap 04/15/2021   • Varicella 11/29/2021, 01/03/2022       Health Maintenance   Topic Date Due   • LIPID PANEL  04/15/2022   • INFLUENZA VACCINE  08/01/2022   • TDAP/TD VACCINES (3 - Td or Tdap) 04/15/2031            Assessment and Plan    Diagnoses and all orders for this visit:    1. Well adult exam (Primary)  -     CBC (No Diff); Future  -     Comprehensive Metabolic Panel; Future  -     TSH Rfx On Abnormal To Free T4; Future  -     Lipid Panel; Future  Check fasting labs today.  2. Essential hypertension  -     Comprehensive Metabolic Panel; Future  -     Lipid Panel; Future  -     losartan (COZAAR) 100 MG tablet; Take 1 tablet by mouth Daily.  Dispense: 90 tablet; Refill: 1  -     amLODIPine (NORVASC) 2.5 MG tablet; Take 1 tablet by mouth Every Night.  Dispense: 90 tablet; Refill: 1  Blood pressure well controlled with losartan and amlodipine.  Recheck in 6 months.  3. Hypertriglyceridemia  -     Comprehensive Metabolic Panel; Future  -     Lipid Panel; Future  Patient has made lifestyle changes to his diet.  Check follow-up labs.  4. Weak urinary stream  -     PSA DIAGNOSTIC ONLY; Future  -     Ambulatory Referral to Urology  -     Urinalysis With Culture If  Indicated -; Future  New symptom needs further evaluation.  Check labs and urine today as well as establish with urologist.  5. Low testosterone  -     Testosterone; Future  -     Ambulatory Referral to Urology  Patient is no longer pursuing vasectomy reversal or further family-planning.  He is interested in restarting testosterone.  Discussed with patient we will check labs today but would need to have clearance for urology with his above problem before starting testosterone replacement.      Counseled on health maintenance topics and preventative care recommendations: Immunizations, diet, screening for diabetes, screening thyroid, screening cholesterol      Follow Up   Return in about 6 months (around 10/22/2022) for Office visit HTN and , Physical and fasting labs 1 years.  Patient was given instructions and counseling regarding his condition or for health maintenance advice. Please see specific information pulled into the AVS if appropriate.      Electronically signed by Magaly De Jesus MD, 04/22/22, 10:22 AM EDT.

## 2022-05-02 ENCOUNTER — DOCUMENTATION (OUTPATIENT)
Dept: PSYCHIATRY | Facility: CLINIC | Age: 37
End: 2022-05-02

## 2022-05-02 DIAGNOSIS — F90.0 ATTENTION DEFICIT HYPERACTIVITY DISORDER (ADHD), PREDOMINANTLY INATTENTIVE TYPE: Chronic | ICD-10-CM

## 2022-05-02 DIAGNOSIS — F90.8 ADHD, ADULT RESIDUAL TYPE: Primary | ICD-10-CM

## 2022-05-02 NOTE — PROGRESS NOTES
"Patient messaged this APRN via [employer] on previous medication refill request.  The patient's message stated: \"I am so sorry to have missed my appointment yesterday. I would like to reschedule for the soonest possible appointment available. I am willing to be place don the list to take canceled appointments, as well. I am almost out of my Vyvanse. Is there anyway I can get enough to last until my new appointment? The 60mg was working great for me, no problems at all and my ADHD symptoms were much better on the 60. I would say ADHD symptoms were 2 out of 10, depression was 2 out of 10, anxiety was 2 out of 10. Thanks!\"    Refill for Vyvanse 60 mg daily sent in at this time and support staff instructed to notify the patient that refill has been sent in and also schedule the patient for follow-up appointment.         "

## 2022-05-23 DIAGNOSIS — F90.8 ADHD, ADULT RESIDUAL TYPE: ICD-10-CM

## 2022-06-13 DIAGNOSIS — F90.8 ADHD, ADULT RESIDUAL TYPE: ICD-10-CM

## 2022-06-13 DIAGNOSIS — F41.1 GENERALIZED ANXIETY DISORDER: Chronic | ICD-10-CM

## 2022-06-13 DIAGNOSIS — F33.0 MILD EPISODE OF RECURRENT MAJOR DEPRESSIVE DISORDER: Chronic | ICD-10-CM

## 2022-06-14 RX ORDER — BUPROPION HYDROCHLORIDE 300 MG/1
TABLET ORAL
Qty: 30 TABLET | Refills: 0 | Status: SHIPPED | OUTPATIENT
Start: 2022-06-14 | End: 2022-06-28 | Stop reason: SDUPTHER

## 2022-06-28 ENCOUNTER — TELEMEDICINE (OUTPATIENT)
Dept: PSYCHIATRY | Facility: CLINIC | Age: 37
End: 2022-06-28

## 2022-06-28 DIAGNOSIS — F90.8 ADHD, ADULT RESIDUAL TYPE: Primary | Chronic | ICD-10-CM

## 2022-06-28 DIAGNOSIS — F33.0 MILD EPISODE OF RECURRENT MAJOR DEPRESSIVE DISORDER: Chronic | ICD-10-CM

## 2022-06-28 DIAGNOSIS — F41.1 GENERALIZED ANXIETY DISORDER: Chronic | ICD-10-CM

## 2022-06-28 DIAGNOSIS — Z79.899 MEDICATION MANAGEMENT: ICD-10-CM

## 2022-06-28 PROCEDURE — 99214 OFFICE O/P EST MOD 30 MIN: CPT | Performed by: NURSE PRACTITIONER

## 2022-06-28 RX ORDER — BUPROPION HYDROCHLORIDE 300 MG/1
300 TABLET ORAL DAILY
Qty: 90 TABLET | Refills: 0 | Status: SHIPPED | OUTPATIENT
Start: 2022-06-28 | End: 2022-08-23

## 2022-06-28 NOTE — PROGRESS NOTES
"This provider is located at the [facility] (through [facility]), [address], [place], [address] using a secure [employer] Video Visit through [employer]. Patient is being seen remotely via telehealth at their home address in [place], and stated they are in a secure environment for this session. The patient's condition being diagnosed/treated is appropriate for telemedicine. The provider identified herself as well as her credentials. The patient, and/or patients guardian, consent to be seen remotely, and when consent is given they understand that the consent allows for patient identifiable information to be sent to a third party as needed. They may refuse to be seen remotely at any time. The electronic data is encrypted and password protected, and the patient and/or guardian has been advised of the potential risks to privacy not withstanding such measures.    You have chosen to receive care through a telehealth visit.  Do you consent to use a video/audio connection for your medical care today? Yes     Subjective   Frank Champion is a 36 y.o. male who is here today for medication management follow up.    Chief Complaint: ADHD, depression, anxiety    History of Present Illness:  The patient describes his mood as \"good\" over the last few weeks.  The patient endorses that he has been doing well over the past few months.  The patient states that he noticed a significant proven and ADHD symptoms after beginning the increased dose of Vyvanse and states that it has continued to be effective for him.  The patient states that overall his depression, anxiety, and ADHD have been very well controlled recently.  The patient states that he is currently dealing with some life stressors that have contributed to some anxiety, but states that overall his anxiety has continued to be well controlled and is manageable at this time.  The patient reports his appetite as good.  The patient reports " his sleep as good.  The patient denies any nightmares or bad dreams.  The patient denies any new medical problems or changes in medications since last appointment with this facility.  The patient reports compliance with current medication regimen.  The patient denies any current side effects from his current medication regimen.  The patient denies any abnormal muscle movements or tics.  The patient rates his ADHD at a 3/10 on a 0-10 scale, with 10 being the worst.  The patient rates his depression at a 2/10 on a 0-10 scale, with 10 being the worst.  The patient rates his anxiety at a 4/10 on a 0-10 scale, with 10 being the worst.  The patient rates hopelessness at a 0/10 on a 0-10 scale with 10 being the worst.  The patient would like to not adjust or change his medications at this visit.  The patient denies suicidal ideations and homicidal ideations, and is convincing.  The patient denies any auditory hallucinations or visual hallucinations.  The patient does not endorse significant symptoms consistent with bipolar disorder or a psychotic illness.                  Prior Psychiatric Medications:  Tenex 1 mg nightly. States that this medication caused excessive sedation and daytime drowsiness.   Strattera 40 mg daily. States this medications was effective for ADHD, but caused severe urinary and sexual side effects that he could not tolerate.  Reports that these side effects began 2-3 weeks after beginning Straterra and stopped about 1 week after discontinuing it.   Adderall XR 15 mg daily - only mildly effective for ADHD  Adderall XR 20 mg daily - significant increase in anxiety and irritability with no improvement in focus and concentration from the 15 mg   Concerta 36 and 54 mg daily - ineffective      The following portions of the patient's history were reviewed and updated as appropriate: allergies, current medications, past family history, past medical history, past social history, past surgical history and  problem list.    Review of Systems   Constitutional: Negative for activity change, appetite change, fatigue and unexpected weight change.   Psychiatric/Behavioral: Positive for decreased concentration and dysphoric mood. Negative for agitation, behavioral problems, confusion, hallucinations, self-injury, sleep disturbance and suicidal ideas. The patient is nervous/anxious. The patient is not hyperactive.        Objective   Physical Exam  Constitutional:       Appearance: Normal appearance.   Neurological:      Mental Status: He is alert.   Psychiatric:         Attention and Perception: Attention and perception normal. He is attentive.         Mood and Affect: Mood and affect normal. Mood is not anxious or depressed.         Speech: Speech normal.         Behavior: Behavior normal. Behavior is cooperative.         Thought Content: Thought content normal. Thought content does not include homicidal or suicidal ideation. Thought content does not include homicidal or suicidal plan.         Cognition and Memory: Cognition and memory normal.         Judgment: Judgment normal.       There were no vitals taken for this visit.    The patient was seen remotely today via a MyChart Video Visit through [facility].  Unable to obtain vital signs due to nature of remote visit.  Height stated at 71.5 inches.  Weight stated at 246 pounds.     Allergies   Allergen Reactions   • Guanfacine Other (See Comments)     sedation   • Strattera [Atomoxetine] Other (See Comments)     Erectile dysfunction, difficulty urinating, urinary incontinence   • Sulfa Antibiotics GI Intolerance       Recent Results (from the past 2016 hour(s))   CBC (No Diff)    Collection Time: 04/22/22 10:22 AM    Specimen: Blood   Result Value Ref Range    WBC 7.22 3.40 - 10.80 10*3/mm3    RBC 4.29 4.14 - 5.80 10*6/mm3    Hemoglobin 12.8 (L) 13.0 - 17.7 g/dL    Hematocrit 38.6 37.5 - 51.0 %    MCV 90.0 79.0 - 97.0 fL    MCH 29.8 26.6 - 33.0 pg    MCHC 33.2 31.5 - 35.7 g/dL     RDW 12.6 12.3 - 15.4 %    RDW-SD 41.3 37.0 - 54.0 fl    MPV 11.8 6.0 - 12.0 fL    Platelets 397 140 - 450 10*3/mm3   Comprehensive Metabolic Panel    Collection Time: 04/22/22 10:22 AM    Specimen: Blood   Result Value Ref Range    Glucose 88 65 - 99 mg/dL    BUN 13 6 - 20 mg/dL    Creatinine 0.96 0.76 - 1.27 mg/dL    Sodium 140 136 - 145 mmol/L    Potassium 4.0 3.5 - 5.2 mmol/L    Chloride 105 98 - 107 mmol/L    CO2 24.6 22.0 - 29.0 mmol/L    Calcium 9.4 8.6 - 10.5 mg/dL    Total Protein 7.7 6.0 - 8.5 g/dL    Albumin 4.50 3.50 - 5.20 g/dL    ALT (SGPT) 31 1 - 41 U/L    AST (SGOT) 20 1 - 40 U/L    Alkaline Phosphatase 59 39 - 117 U/L    Total Bilirubin 0.3 0.0 - 1.2 mg/dL    Globulin 3.2 gm/dL    A/G Ratio 1.4 g/dL    BUN/Creatinine Ratio 13.5 7.0 - 25.0    Anion Gap 10.4 5.0 - 15.0 mmol/L    eGFR 105.7 >60.0 mL/min/1.73   TSH Rfx On Abnormal To Free T4    Collection Time: 04/22/22 10:22 AM    Specimen: Blood   Result Value Ref Range    TSH 1.200 0.270 - 4.200 uIU/mL   Lipid Panel    Collection Time: 04/22/22 10:22 AM    Specimen: Blood   Result Value Ref Range    Total Cholesterol 191 0 - 200 mg/dL    Triglycerides 167 (H) 0 - 150 mg/dL    HDL Cholesterol 32 (L) 40 - 60 mg/dL    LDL Cholesterol  129 (H) 0 - 100 mg/dL    VLDL Cholesterol 30 5 - 40 mg/dL    LDL/HDL Ratio 3.93    PSA DIAGNOSTIC ONLY    Collection Time: 04/22/22 10:22 AM    Specimen: Blood   Result Value Ref Range    PSA 0.626 0.000 - 4.000 ng/mL   Testosterone    Collection Time: 04/22/22 10:22 AM    Specimen: Blood   Result Value Ref Range    Testosterone, Total 315.00 249.00 - 836.00 ng/dL   Urinalysis With Culture If Indicated - Urine, Clean Catch    Collection Time: 04/22/22 10:22 AM    Specimen: Urine, Clean Catch   Result Value Ref Range    Color, UA Yellow Yellow, Straw    Appearance, UA Clear Clear    pH, UA 5.5 5.0 - 8.0    Specific Gravity, UA 1.026 1.005 - 1.030    Glucose, UA Negative Negative    Ketones, UA Trace (A) Negative     Bilirubin, UA Negative Negative    Blood, UA Negative Negative    Protein, UA Negative Negative    Leuk Esterase, UA Trace (A) Negative    Nitrite, UA Negative Negative    Urobilinogen, UA 0.2 E.U./dL 0.2 - 1.0 E.U./dL   Urinalysis, Microscopic Only - Urine, Clean Catch    Collection Time: 04/22/22 10:22 AM    Specimen: Urine, Clean Catch   Result Value Ref Range    RBC, UA None Seen None Seen, 0-2 /HPF    WBC, UA 3-5 (A) None Seen, 0-2 /HPF    Bacteria, UA None Seen None Seen /HPF    Squamous Epithelial Cells, UA 3-6 (A) None Seen, 0-2 /HPF    Hyaline Casts, UA 3-6 None Seen /LPF    Methodology Manual Light Microscopy        Current Medications:   Current Outpatient Medications   Medication Sig Dispense Refill   • amLODIPine (NORVASC) 2.5 MG tablet Take 1 tablet by mouth Every Night. 90 tablet 1   • buPROPion XL (WELLBUTRIN XL) 300 MG 24 hr tablet Take 1 tablet by mouth Daily. 90 tablet 0   • lisdexamfetamine (VYVANSE) 60 MG capsule Take 1 capsule by mouth Every Morning 30 capsule 0   • losartan (COZAAR) 100 MG tablet Take 1 tablet by mouth Daily. 90 tablet 1     No current facility-administered medications for this visit.       Mental Status Exam:   Hygiene:   good  Cooperation:  Cooperative  Eye Contact:  Good  Psychomotor Behavior:  Appropriate  Affect:  Full range  Hopelessness: Denies  Speech:  Normal  Thought Process:  Goal directed  Thought Content:  Normal  Suicidal:  None  Homicidal:  None  Hallucinations:  None  Delusion:  None  Memory:  Intact  Orientation:  Person, Place, Time and Situation  Reliability:  good  Insight:  Good  Judgement:  Good  Impulse Control:  Good  Physical/Medical Issues:  Yes See medical history         The ROSA report, request number [id number], of the past 12 months were reviewed and is appropriate.  The patient/guardian reports taking the medication only as prescribed.  The patient/guardian denies any abuse or misuse of the medication.  The patient/guardian denies any other  substance use or issues.  There are no apparent substance related issues.  The patient reports no side effects of the current medication usage.  The patient/guardian has reported significant improvement with medication usage and wishes to continue medication as prescribed.  The patient/guardian is appropriate to continue with current medication usage at this time.  Reinforced risks and side effects of medication usage, patient and/or guardian verbalize understanding in their own words and are in agreement with current plan.        Assessment & Plan   Diagnoses and all orders for this visit:    1. ADHD, adult residual type (Primary)  -     lisdexamfetamine (VYVANSE) 60 MG capsule; Take 1 capsule by mouth Every Morning  Dispense: 30 capsule; Refill: 0    2. Generalized anxiety disorder  -     buPROPion XL (WELLBUTRIN XL) 300 MG 24 hr tablet; Take 1 tablet by mouth Daily.  Dispense: 90 tablet; Refill: 0    3. Mild episode of recurrent major depressive disorder (HCC)  -     buPROPion XL (WELLBUTRIN XL) 300 MG 24 hr tablet; Take 1 tablet by mouth Daily.  Dispense: 90 tablet; Refill: 0    4. Medication management  -     Urine Drug Screen - Urine, Clean Catch; Future            Visit Diagnoses:    ICD-10-CM ICD-9-CM   1. ADHD, adult residual type  F90.8 314.01   2. Generalized anxiety disorder  F41.1 300.02   3. Mild episode of recurrent major depressive disorder (HCC)  F33.0 296.31   4. Medication management  Z79.899 V58.69       GOALS:  Short Term Goals: Patient will be compliant with medication, and patient will have no significant medication related side effects.  Patient will be engaged in psychotherapy as indicated.  Patient will report subjective improvement of symptoms.  Long term goals: To stabilize mood and treat/improve subjective symptoms, the patient will stay out of the hospital, the patient will be at an optimal level of functioning, and the patient will take all medications as prescribed.  The patient  verbalized understanding and agreement with goals that were mutually set.      SUICIDE RISK ASSESSMENT: Unalterable demographics and a history of mental health intervention indicate this patient is in a high risk category compared to the general population. At present, the patient denies active SI/HI, intentions, or plans at this time and agrees to seek immediate care should such thoughts develop. The patient verbalizes understanding of how to access emergency care if needed and agrees to do so. Consideration of suicide risk and protective factors such as history, current presentation, individual strengths and weaknesses, psychosocial and environmental stressors and variables, psychiatric illness and symptoms, medical conditions and pain, took place in this interview. Based on those considerations, the patient is determined: within individual baseline and presenting no imminent risk for suicide or homicide. Other recommendations: The patient does not meet the criteria for inpatient admission and is not a safety risk to self or others at today's visit. Inpatient treatment offers no significant advantages over outpatient treatment for this patient at today's visit.      SAFETY PLAN:  Patient was given ample time for questions and fully participated in treatment planning.  Patient was encouraged to call the clinic with any questions or concerns.  Patient was informed of access to emergency care. If patient were to develop any significant symptomatology, suicidal ideation, homicidal ideation, any concerns, or feel unsafe at any time they are to call the clinic and if unable to get immediate assistance should immediately call 911 or go to the nearest emergency room.  The patient is advised to remove or secure (lock away) all lethal weapons (including guns) and sharps (including razors, scissors, knives, etc.).  All medications (including any prescribed and any over the counter medications) should be stored in a safe and  secured location that is not obtainable by children/adolescents.  Patient was given an opportunity and encouraged to ask questions about their medication, illness, and treatment. Patient contracted verbally for the following: If you are experiencing an emotional crisis or have thoughts of harming yourself or others, please go to your nearest local emergency room or call 911. Will continue to re-assess medication response and side effects frequently to establish efficacy and ensure safety. Risks, any black box warnings, side effects, off label usage, and benefits of medication and treatment discussed with patient, along with potential adverse side effects of current and/or newly prescribed medication, alternative treatment options, and OTC medications.  Patient verbalized understanding of potential risks, any off label use of medication, any black box warnings, and any side effects in their own words. The patient verbalized understanding and agreed to comply with the safety plan discussed in their own words.  Patient given the number to the office. Number also available to the 24- hour suicide hotline.      TREATMENT PLAN/GOALS: Continue medications and treatment plan as indicated. Treatment and medication options discussed during today's visit. Patient acknowledged and verbally consented to continue with current treatment plan and was educated on the importance of compliance with treatment and follow-up appointments.        -Continue Vyvanse 60 mg daily for ADHD  -Continue Wellbutrin  mg daily for depression/anxiety/ADHD  -Complete UDS for monitoring due to continued treatment with controlled substance (stimulant).  UDS ordered at this time.  Discussed with the patient that a [employer] message with a list of [facility] lab locations in his area where he can complete UDS will be sent to him at the conclusion of today's visit.  Instructed the patient to complete UDS at his earliest convenience but that it must  be completed prior to next scheduled follow-up visit in approximately 3 months.  The patient verbalizes understanding and endorses that he is agreeable to this.    -Discussed with the patient that stimulants can only be prescribed up to a 30-day supply with no refills by this APRN.  -Discussed with the patient that since we are planning to follow-up with next scheduled visit with this APRN in approximately 3 months, he will have to request a refill for his Vyvnase prior to his next scheduled follow-up appointment.  Instructed patient to request a refill approximately 4 to 5 days before running out of medication via the [employer] laxmi or by calling the clinic to ensure that refill can be sent in an appropriate time and he does not run out of medication.  The patient verbalizes understanding and endorses that he is agreeable to this.        MEDICATION ISSUES: Discussed medication options and treatment plan of prescribed medication, any off label use of medication, as well as the risks, benefits, any black box warnings including increased suicidality, and side effects including but not limited to potential falls, dizziness, possible impaired driving, GI side effects (change in appetite, abdominal discomfort, nausea, vomiting, diarrhea, and/or constipation), dry mouth, somnolence, sedation, insomnia, activation, agitation, irritation, tremors, abnormal muscle movements or disorders, headache, sweating, possible bruising or rare bleeding, electrolyte and/or fluid abnormalities, change in blood pressure/heart rate/and or heart rhythm, sexual dysfunction, and metabolic adversities among others. Patient and/or guardian agreeable to call the office with any worsening of symptoms or onset of side effects, or if any concerns or questions arise.  The contact information for the office is made available to the patient and/or guardian.  Patient and/or guardian agreeable to call 911 or go to the nearest ER should they begin having  any SI/HI, or if any urgent concerns arise. No medication side effects or related complaints today.    The patient is being prescribed a controlled substance as part of the treatment plan. The patient/guardian has been educated of appropriate use of the medication(s), including risks and side effects such as insomnia, headache, exacerbation of tics, nervousness, irritability, overstimulation, tremor, dizziness, anorexia or change in appetite, nausea, dry mouth, constipation, diarrhea, weight loss, sexual dysfunction, psychotic episodes, seizures, palpitations, tachycardia, hypertension, rare activation (activation of hypomania, chelsie, and/or suicidal ideations), cardiovascular adverse effects including sudden death (especially patients with pre-existing structural abnormalities often associated with a family history of cardiac disease), may slow normal growth in children, weight gain is reported but not expected, sedation is possible but activation is much more common, metabolic adversities, and overdose among others. Patient/guardian is also informed that the medication is to be used by the patient only, the medication is to be used only as directed, and the medication should not be combined with other substances unless directed by a Provider/Prescriber. The patient/guardian verbalized understanding and agreement with this in their own words, and wish to continue with current treatment plan.    Without the prescribed medication for ADHD, it is reported that the patient has problems with attention and focus including being easily distracted, easily losing objects, trouble with time management, trouble completing tasks because of distractions, procrastination, indecisiveness, careless mistakes in daily activities/work/school, and not finishing jobs that are started.  Patient denies any side effects, no worsening of insomnia, and no worsening of anxiety on the medication dose.  Due to the reported problems without the  medication usage, as well as the significant improvement in symptoms with the medication usage, the patient requests to remain on the prescribed medication for ADHD.                    [facility] No Show Policy:  We understand unexpected circumstances arise; however, anytime you miss your appointment we are unable to provide you appropriate care.  In addition, each appointment missed could have been used to provide care for others.  We ask that you call at least 24 hours in advance to cancel or reschedule an appointment.  We would like to take this opportunity to remind you of our policy stating patients who miss THREE or more appointments without cancelling or rescheduling 24 hours in advance of the appointment may be subject to cancellation of any further visits with our clinic and recommendation to seek in-person services/visits.    Please call [phone] to reschedule your appointment. If there are reasons that make it difficult for you to keep the appointments, please call and let us know how we can help.  Please understand that medication prescribing will not continue without seeing your provider.      [facility]'s No Show Policy reviewed with patient at today's visit. Patient verbalized understanding of this policy. Discussed with patient that in the event that there are three or more no show visits, it will be recommended that they pursue in-person services/visits as noncompliance with telehealth visits indicates that patient is not an appropriate candidate for telemedicine and would likely be more appropriate for in-person services/visits. Patient verbalizes understanding and is agreeable to this.      MEDS ORDERED DURING VISIT:  New Medications Ordered This Visit   Medications   • lisdexamfetamine (VYVANSE) 60 MG capsule     Sig: Take 1 capsule by mouth Every Morning     Dispense:  30 capsule     Refill:  0   • buPROPion XL (WELLBUTRIN XL) 300 MG 24 hr  tablet     Sig: Take 1 tablet by mouth Daily.     Dispense:  90 tablet     Refill:  0       Return in about 3 months (around 9/28/2022), or if symptoms worsen or fail to improve, for Recheck.       Patient will follow-up in 3 months, highly encouraged the patient if he had any questions or concerns to contact the behavioral health virtual clinic for sooner appointment patient verbalized understanding.      Functional Status: Mild impairment     Prognosis: Good with Ongoing Treatment             This document has been electronically signed by BETTY Sandy  June 28, 2022 15:05 EDT       Some of the data in this electronic note has been brought forward from a previous encounter, any necessary changes have been made, it has been reviewed by this APRN, and it is accurate.      Part of this note may be an electronic transcription/translation of spoken language to printed text using the Dragon Dictation System.

## 2022-07-12 ENCOUNTER — LAB (OUTPATIENT)
Dept: LAB | Facility: HOSPITAL | Age: 37
End: 2022-07-12

## 2022-07-12 DIAGNOSIS — Z79.899 MEDICATION MANAGEMENT: ICD-10-CM

## 2022-07-12 DIAGNOSIS — F90.8 ADHD, ADULT RESIDUAL TYPE: Chronic | ICD-10-CM

## 2022-07-12 LAB
AMPHET+METHAMPHET UR QL: POSITIVE
AMPHETAMINES UR QL: NEGATIVE
BARBITURATES UR QL SCN: NEGATIVE
BENZODIAZ UR QL SCN: NEGATIVE
BUPRENORPHINE SERPL-MCNC: NEGATIVE NG/ML
CANNABINOIDS SERPL QL: NEGATIVE
COCAINE UR QL: NEGATIVE
METHADONE UR QL SCN: NEGATIVE
OPIATES UR QL: NEGATIVE
OXYCODONE UR QL SCN: NEGATIVE
PCP UR QL SCN: NEGATIVE
PROPOXYPH UR QL: NEGATIVE
TRICYCLICS UR QL SCN: NEGATIVE

## 2022-07-12 PROCEDURE — 80306 DRUG TEST PRSMV INSTRMNT: CPT

## 2022-08-05 ENCOUNTER — TELEMEDICINE (OUTPATIENT)
Dept: FAMILY MEDICINE CLINIC | Facility: CLINIC | Age: 37
End: 2022-08-05

## 2022-08-05 DIAGNOSIS — M25.521 ELBOW PAIN, RIGHT: Primary | ICD-10-CM

## 2022-08-05 DIAGNOSIS — M25.511 ACUTE PAIN OF RIGHT SHOULDER: ICD-10-CM

## 2022-08-05 DIAGNOSIS — E78.1 HYPERTRIGLYCERIDEMIA: Chronic | ICD-10-CM

## 2022-08-05 DIAGNOSIS — I10 ESSENTIAL HYPERTENSION: Chronic | ICD-10-CM

## 2022-08-05 PROCEDURE — 99214 OFFICE O/P EST MOD 30 MIN: CPT | Performed by: NURSE PRACTITIONER

## 2022-08-05 RX ORDER — AMLODIPINE BESYLATE 2.5 MG/1
2.5 TABLET ORAL NIGHTLY
Qty: 90 TABLET | Refills: 1 | Status: SHIPPED | OUTPATIENT
Start: 2022-08-05 | End: 2022-11-21 | Stop reason: SDUPTHER

## 2022-08-05 RX ORDER — CYCLOBENZAPRINE HCL 10 MG
5-10 TABLET ORAL 3 TIMES DAILY PRN
Qty: 30 TABLET | Refills: 1 | Status: SHIPPED | OUTPATIENT
Start: 2022-08-05 | End: 2022-11-28

## 2022-08-05 RX ORDER — LOSARTAN POTASSIUM 100 MG/1
100 TABLET ORAL DAILY
Qty: 90 TABLET | Refills: 1 | Status: SHIPPED | OUTPATIENT
Start: 2022-08-05 | End: 2022-11-21 | Stop reason: SDUPTHER

## 2022-08-05 NOTE — PROGRESS NOTES
Chief Complaint  Shoulder Pain and Arm Pain    Subjective         Frank Champion presents to [facility]  Pt was seen at  last week and given Prednisone for a possible pinched nerve. He had to go to  ER that evening for pain. He was given a muscle relaxer and gabapentin for treatment. He states those medications did help. He has also been home with covid this week, so was not able to keep his follow up appt. He states there was not imaging done at . X-ray was done at . There was arthritis on x-ray, but no other findings. He states he felt like the cyclobenzaprine helped more than the gabapentin. He wants to discuss next step in treatment options. Referral made to PT.     Pt also needs refills for chronic medications. Most recent blood pressure in office was 126/85.     Objective   Vital Signs:   There were no vitals taken for this visit.    Physical Exam   Constitutional: He appears well-developed.   Pulmonary/Chest: Effort normal.   Musculoskeletal: Normal range of motion.   Neurological: He is alert.   Skin: Skin is warm.   Psychiatric: He has a normal mood and affect.     Result Review :                 Assessment and Plan    Diagnoses and all orders for this visit:    1. Elbow pain, right (Primary)  -     cyclobenzaprine (FLEXERIL) 10 MG tablet; Take 0.5-1 tablets by mouth 3 (Three) Times a Day As Needed for Muscle Spasms.  Dispense: 30 tablet; Refill: 1  -     Ambulatory Referral to Physical Therapy Evaluate and treat    2. Acute pain of right shoulder  -     cyclobenzaprine (FLEXERIL) 10 MG tablet; Take 0.5-1 tablets by mouth 3 (Three) Times a Day As Needed for Muscle Spasms.  Dispense: 30 tablet; Refill: 1  -     Ambulatory Referral to Physical Therapy Evaluate and treat    3. Essential hypertension  -     amLODIPine (NORVASC) 2.5 MG tablet; Take 1 tablet by mouth Every Night.  Dispense: 90 tablet; Refill: 1  -     losartan (COZAAR) 100 MG tablet; Take 1 tablet by mouth  Daily.  Dispense: 90 tablet; Refill: 1    4. Hypertriglyceridemia        Follow Up   No follow-ups on file.  Patient was given instructions and counseling regarding his condition or for health maintenance advice. Please see specific information pulled into the AVS if appropriate.     Mode of Visit: Video  Location of patient: other: Workplace   Location of provider: Exam room  You have chosen to receive care through a telehealth visit.  The patient has signed the video visit consent form.  The visit included audio and video interaction. No technical issues occurred during this visit.

## 2022-08-11 DIAGNOSIS — F90.8 ADHD, ADULT RESIDUAL TYPE: Chronic | ICD-10-CM

## 2022-08-23 DIAGNOSIS — F41.1 GENERALIZED ANXIETY DISORDER: Chronic | ICD-10-CM

## 2022-08-23 DIAGNOSIS — F33.0 MILD EPISODE OF RECURRENT MAJOR DEPRESSIVE DISORDER: Chronic | ICD-10-CM

## 2022-08-23 RX ORDER — BUPROPION HYDROCHLORIDE 300 MG/1
300 TABLET ORAL DAILY
Qty: 90 TABLET | Refills: 0 | Status: SHIPPED | OUTPATIENT
Start: 2022-08-23 | End: 2022-09-12 | Stop reason: SDUPTHER

## 2022-08-24 ENCOUNTER — TREATMENT (OUTPATIENT)
Dept: PHYSICAL THERAPY | Facility: CLINIC | Age: 37
End: 2022-08-24

## 2022-08-24 DIAGNOSIS — R29.898 RIGHT ARM WEAKNESS: Primary | ICD-10-CM

## 2022-08-24 PROCEDURE — 97162 PT EVAL MOD COMPLEX 30 MIN: CPT | Performed by: PHYSICAL THERAPIST

## 2022-08-24 NOTE — PROGRESS NOTES
Physical Therapy Initial Evaluation and Plan of Care      Patient: Frank Champion   : 1985  Diagnosis/ICD-10 Code:  The encounter diagnosis was Right arm weakness.   Referring practitioner: No ref. provider found  Date of Initial Visit: Type: THERAPY  Noted: 2022  Today's Date: 2022  Patient seen for 1 sessions         Visit Diagnoses:    ICD-10-CM ICD-9-CM   1. Right arm weakness  R29.898 729.89       Subjective Questionnaire: QuickDASH: 29.55    Subjective Evaluation    History of Present Illness  Onset date: about 2 months ago.  Mechanism of injury: Pt recalls gradual onset of symptoms.  He reports history of intermittent neck stiffness about 3-4x per year.  About 2 months ago, he began having R arm pain which increased over a 4 week period, and he eventually sought treatment at urgent care and the ER.      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  Pain  Current pain ratin  At best pain ratin  At worst pain ratin  Quality: numbness, tingling, weakness.  Relieving factors: change in position  Exacerbated by: sustained or repetitive activity.  Progression: improved    Social Support  Lives with: spouse    Hand dominance: right    Diagnostic Tests  Abnormal x-ray: no acute findings.    Treatments  No previous or current treatments  Patient Goals  Patient goals for therapy: decreased pain and increased strength           Treatment            Functional Testing  Functional Tests Options: Quick DASH   Objective          Postural Observations  Seated posture: fair  Standing posture: fair        Neurological Testing     Reflexes   Left   Biceps (C5/C6): normal (2+)  Brachioradialis (C6): normal (2+)  Triceps (C7): normal (2+)    Right   Biceps (C5/C6): normal (2+)  Brachioradialis (C6): normal (2+)  Triceps (C7): trace (1+)    Additional Neurological Details  Diminished sensation in dorsal  forearm, index finger,a nd 1/2 of middle finger.    Active Range of Motion     Additional Active Range of Motion Details  CROM WNL's and with no reported change in R UE symptoms.    Full active B UE AROM without symptom change.    Strength/Myotome Testing   Cervical Spine     Left   Normal strength    Right Shoulder     Planes of Motion   Flexion: 4+   Abduction: 4+   External rotation at 0°: 5   Internal rotation at 0°: 5     Right Elbow   Flexion: 5  Extension: 4-    Right Wrist/Hand   Wrist extension: 5  Wrist flexion: 4-    Tests   Cervical     Right   Positive ULTT2 and ULTT3.     Additional Tests Details  Radial bias nerve tension more pronounced than median bias.          Assessment & Plan     Assessment  Impairments: activity intolerance, impaired physical strength and lacks appropriate home exercise program  Functional Limitations: lifting, uncomfortable because of pain and unable to perform repetitive tasks  Assessment details: Clinical presentation consistent with residual C7 radiculopathy.  Pt will benefit from PT intervention to address remaining deficits in strength and sensation.   Prognosis: good    Goals  Plan Goals: 4 weeks  1)Pt. demonstrates independence and compliance with initial HEP.  2)Pt. reports reduction in intensity and distribution of R UE symptoms to no worse than 3/10 on NPRS.  3)R UE strength shows improvement over baseline measures.  4)Functional outcome measure is improved by 6 points, indicating improving functional abilities.    6 weeks  1)Pt. demonstrates independence in advanced HEP for ongoing improvement.  2)Pt demonstrates awareness of correct posture and importance of changes of position during work day.  3)R UE strength is sufficient to allow participation in desired activities.  4)Functional outcome measure is improved to ,20 points, indicating improving functional abilities.          Plan  Therapy options: will be seen for skilled therapy services  Planned therapy  interventions: manual therapy, flexibility, therapeutic activities, stretching, strengthening, soft tissue mobilization, postural training and neuromuscular re-education  Frequency: 1x week  Duration in visits: 6  Treatment plan discussed with: patient  Plan details: PT weekly for 6 weeks, addressing residual strength and sensory deficits from resolving cervical radiculopathy.        Timed:  Manual Therapy:         mins  86943;  Therapeutic Exercise:         mins  83471;     Neuromuscular Marilyn:        mins  59108;    Therapeutic Activity:          mins  94184;     Gait Training:           mins  67925;     Ultrasound:          mins  64276;    Electrical Stimulation:         mins  56652 ( );  Iontophoresis  ___ mins   88415    Untimed:  Electrical Stimulation:         mins  16256 ( );  Mechanical Traction:         mins  02133;     Timed Treatment:      mins   Total Treatment:     45   mins    PT SIGNATURE: Stefany Reynoso PT   Electronically signed  DATE TREATMENT INITIATED: 8/24/2022    Initial Certification  Certification Period: 8/24/20228471mday35/21/2022  I certify that the therapy services are furnished while this patient is under my care.  The services outlined above are required by this patient, and will be reviewed every 90 days.    Physician Signature:_____________________________________________             PHYSICIAN:   NPI:                                       DATE:       Please sign and return via fax to [phone].. Thank you, [facility].

## 2022-09-12 DIAGNOSIS — F41.1 GENERALIZED ANXIETY DISORDER: Chronic | ICD-10-CM

## 2022-09-12 DIAGNOSIS — F33.0 MILD EPISODE OF RECURRENT MAJOR DEPRESSIVE DISORDER: Chronic | ICD-10-CM

## 2022-09-12 DIAGNOSIS — F90.8 ADHD, ADULT RESIDUAL TYPE: Chronic | ICD-10-CM

## 2022-09-12 RX ORDER — BUPROPION HYDROCHLORIDE 300 MG/1
300 TABLET ORAL DAILY
Qty: 90 TABLET | Refills: 0 | Status: SHIPPED | OUTPATIENT
Start: 2022-09-12 | End: 2022-12-07 | Stop reason: SDUPTHER

## 2022-09-19 ENCOUNTER — DOCUMENTATION (OUTPATIENT)
Dept: PHYSICAL THERAPY | Facility: CLINIC | Age: 37
End: 2022-09-19

## 2022-09-19 NOTE — PROGRESS NOTES
Discharge Summary  Discharge Summary from Physical Therapy Report      Patient: Frank Champion   : 1985  Diagnosis/ICD-10 Code:  There were no encounter diagnoses.   Referring practitioner: No ref. provider found  Date of Initial Visit: No linked episodes  Today's Date: 2022  Date of Last Visit: 22     Number of Visits: 1    Discharge Status of Patient: Did not return for treatment.    Goals: unknown    Discharge Plan: Continue with current home exercise program as instructed      Date of Discharge: 2022        Stefany Reynoso, PT

## 2022-10-20 DIAGNOSIS — F90.8 ADHD, ADULT RESIDUAL TYPE: Chronic | ICD-10-CM

## 2022-10-20 NOTE — TELEPHONE ENCOUNTER
Provider out of office. Please call and reschedule pt an appointment with regular provider. This APRN will refill medication for 30 days.

## 2022-11-21 DIAGNOSIS — F90.8 ADHD, ADULT RESIDUAL TYPE: Chronic | ICD-10-CM

## 2022-11-21 DIAGNOSIS — I10 ESSENTIAL HYPERTENSION: Chronic | ICD-10-CM

## 2022-11-21 RX ORDER — AMLODIPINE BESYLATE 2.5 MG/1
2.5 TABLET ORAL NIGHTLY
Qty: 90 TABLET | Refills: 0 | Status: SHIPPED | OUTPATIENT
Start: 2022-11-21 | End: 2022-11-28 | Stop reason: SDUPTHER

## 2022-11-21 RX ORDER — LOSARTAN POTASSIUM 100 MG/1
100 TABLET ORAL DAILY
Qty: 90 TABLET | Refills: 0 | Status: SHIPPED | OUTPATIENT
Start: 2022-11-21 | End: 2022-11-28 | Stop reason: SDUPTHER

## 2022-11-21 NOTE — TELEPHONE ENCOUNTER
Rx Refill Note  Requested Prescriptions     Pending Prescriptions Disp Refills   • amLODIPine (NORVASC) 2.5 MG tablet 90 tablet 1     Sig: Take 1 tablet by mouth Every Night.   • losartan (COZAAR) 100 MG tablet 90 tablet 1     Sig: Take 1 tablet by mouth Daily.      Last office visit with prescribing clinician: Visit date not found      Next office visit with prescribing clinician: Visit date not found            April ELIAS Champion  11/21/22, 10:24 EST

## 2022-11-21 NOTE — TELEPHONE ENCOUNTER
Rx Refill Note  Requested Prescriptions     Pending Prescriptions Disp Refills   • amLODIPine (NORVASC) 2.5 MG tablet 90 tablet 1     Sig: Take 1 tablet by mouth Every Night.   • losartan (COZAAR) 100 MG tablet 90 tablet 1     Sig: Take 1 tablet by mouth Daily.      Last office visit with prescribing clinician: 4/22/2022      Next office visit with prescribing clinician: 11/28/2022            Rody Steinberg MA  11/21/22, 10:54 EST

## 2022-11-28 ENCOUNTER — LAB (OUTPATIENT)
Dept: LAB | Facility: HOSPITAL | Age: 37
End: 2022-11-28

## 2022-11-28 ENCOUNTER — OFFICE VISIT (OUTPATIENT)
Dept: FAMILY MEDICINE CLINIC | Facility: CLINIC | Age: 37
End: 2022-11-28

## 2022-11-28 VITALS
HEART RATE: 82 BPM | HEIGHT: 71 IN | OXYGEN SATURATION: 99 % | BODY MASS INDEX: 31.98 KG/M2 | SYSTOLIC BLOOD PRESSURE: 128 MMHG | WEIGHT: 228.4 LBS | DIASTOLIC BLOOD PRESSURE: 82 MMHG

## 2022-11-28 DIAGNOSIS — Z11.3 ROUTINE SCREENING FOR STI (SEXUALLY TRANSMITTED INFECTION): ICD-10-CM

## 2022-11-28 DIAGNOSIS — I10 ESSENTIAL HYPERTENSION: Primary | Chronic | ICD-10-CM

## 2022-11-28 LAB
HCV AB SER DONR QL: NORMAL
HIV1+2 AB SER QL: NORMAL

## 2022-11-28 PROCEDURE — 86706 HEP B SURFACE ANTIBODY: CPT

## 2022-11-28 PROCEDURE — 87661 TRICHOMONAS VAGINALIS AMPLIF: CPT

## 2022-11-28 PROCEDURE — 87491 CHLMYD TRACH DNA AMP PROBE: CPT

## 2022-11-28 PROCEDURE — G0432 EIA HIV-1/HIV-2 SCREEN: HCPCS

## 2022-11-28 PROCEDURE — 86803 HEPATITIS C AB TEST: CPT

## 2022-11-28 PROCEDURE — 87340 HEPATITIS B SURFACE AG IA: CPT

## 2022-11-28 PROCEDURE — 86592 SYPHILIS TEST NON-TREP QUAL: CPT

## 2022-11-28 PROCEDURE — 99213 OFFICE O/P EST LOW 20 MIN: CPT | Performed by: FAMILY MEDICINE

## 2022-11-28 PROCEDURE — 86704 HEP B CORE ANTIBODY TOTAL: CPT

## 2022-11-28 PROCEDURE — 87591 N.GONORRHOEAE DNA AMP PROB: CPT

## 2022-11-28 RX ORDER — AMLODIPINE BESYLATE 2.5 MG/1
2.5 TABLET ORAL NIGHTLY
Qty: 90 TABLET | Refills: 0 | Status: SHIPPED | OUTPATIENT
Start: 2022-11-28 | End: 2023-02-28 | Stop reason: SDUPTHER

## 2022-11-28 RX ORDER — LOSARTAN POTASSIUM 100 MG/1
100 TABLET ORAL DAILY
Qty: 90 TABLET | Refills: 0 | Status: SHIPPED | OUTPATIENT
Start: 2022-11-28 | End: 2023-02-28 | Stop reason: SDUPTHER

## 2022-11-28 NOTE — PROGRESS NOTES
"Chief Complaint  Hypertension and Exposure to STD (Would like to do testing)    Subjective        Frank Champion presents to [facility]  Hypertension  This is a chronic problem. The current episode started more than 1 year ago. The problem has been waxing and waning since onset. The problem is controlled. Associated symptoms include anxiety, malaise/fatigue and PND. Agents associated with hypertension include amphetamines and NSAIDs. Risk factors for coronary artery disease include dyslipidemia, family history, obesity, sedentary lifestyle and stress. Current antihypertension treatment includes angiotensin blockers and calcium channel blockers. There are no compliance problems.    Exposure to STD  The patient's pertinent negatives include no penile discharge or testicular pain. Primary symptoms comment: no dysuria. This is a new problem. He is sexually active. He inconsistently (not with wife, but yes with other partners) uses condoms.     Answers for HPI/ROS submitted by the patient on 11/28/2022  What is the primary reason for your visit?: High Blood Pressure    He would like regular STI testing in open marriage. Heteroflexible. No anal insertive or anal receptive intercourse.       Objective   Vital Signs:  /82   Pulse 82   Ht 180.3 cm (70.98\")   Wt 104 kg (228 lb 6.4 oz)   SpO2 99%   BMI 31.87 kg/m²   Estimated body mass index is 31.87 kg/m² as calculated from the following:    Height as of this encounter: 180.3 cm (70.98\").    Weight as of this encounter: 104 kg (228 lb 6.4 oz).          Physical Exam  Vitals reviewed.   Constitutional:       General: He is not in acute distress.     Appearance: He is not ill-appearing.   Cardiovascular:      Rate and Rhythm: Normal rate and regular rhythm.   Pulmonary:      Effort: Pulmonary effort is normal.      Breath sounds: Normal breath sounds.   Neurological:      Mental Status: He is alert.   Psychiatric:         Mood and " Affect: Mood normal.        Result Review :                Assessment and Plan   Diagnoses and all orders for this visit:    1. Essential hypertension (Primary)  -     losartan (COZAAR) 100 MG tablet; Take 1 tablet by mouth Daily.  Dispense: 90 tablet; Refill: 0  -     amLODIPine (NORVASC) 2.5 MG tablet; Take 1 tablet by mouth Every Night.  Dispense: 90 tablet; Refill: 0  Stable.  Continue losartan and amlodipine.  Recheck in 6 months  2. Routine screening for STI (sexually transmitted infection)  -     Hepatitis B Virus (HBV) Screening and Diagnosis; Future  -     Hepatitis C Antibody; Future  -     Chlamydia trachomatis, Neisseria gonorrhoeae, Trichomonas vaginalis, PCR - Urine, Urine, Clean Catch; Future  -     RPR; Future  -     HIV-1 / O / 2 Ag / Antibody 4th Generation; Future  Asymptomatic testing.           Follow Up   Return in about 6 months (around 5/28/2023) for Physical and fasting labs.  Patient was given instructions and counseling regarding his condition or for health maintenance advice. Please see specific information pulled into the AVS if appropriate.     Electronically signed by Magaly Taylor MD, 11/28/22, 2:06 PM EST.

## 2022-11-29 LAB — RPR SER QL: NORMAL

## 2022-11-30 LAB
HBV CORE AB SERPL QL IA: NEGATIVE
HBV SURFACE AB SER QL: REACTIVE
HBV SURFACE AG SERPL QL IA: NEGATIVE
IMP & REVIEW OF LAB RESULTS: NORMAL
LABORATORY COMMENT REPORT: NORMAL

## 2022-12-01 LAB
C TRACH RRNA SPEC QL NAA+PROBE: NEGATIVE
N GONORRHOEA RRNA SPEC QL NAA+PROBE: NEGATIVE
T VAGINALIS RRNA SPEC QL NAA+PROBE: NEGATIVE

## 2022-12-07 ENCOUNTER — TELEMEDICINE (OUTPATIENT)
Dept: PSYCHIATRY | Facility: CLINIC | Age: 37
End: 2022-12-07

## 2022-12-07 DIAGNOSIS — F90.8 ADHD, ADULT RESIDUAL TYPE: Primary | Chronic | ICD-10-CM

## 2022-12-07 DIAGNOSIS — F33.0 MILD EPISODE OF RECURRENT MAJOR DEPRESSIVE DISORDER: Chronic | ICD-10-CM

## 2022-12-07 DIAGNOSIS — R29.818 SUSPECTED SLEEP APNEA: ICD-10-CM

## 2022-12-07 DIAGNOSIS — F41.1 GENERALIZED ANXIETY DISORDER: Chronic | ICD-10-CM

## 2022-12-07 PROCEDURE — 99214 OFFICE O/P EST MOD 30 MIN: CPT | Performed by: NURSE PRACTITIONER

## 2022-12-07 RX ORDER — BUPROPION HYDROCHLORIDE 300 MG/1
300 TABLET ORAL DAILY
Qty: 90 TABLET | Refills: 0 | Status: SHIPPED | OUTPATIENT
Start: 2022-12-07 | End: 2023-03-02 | Stop reason: SDUPTHER

## 2022-12-07 NOTE — PROGRESS NOTES
"This provider is located at the [facility] (through [facility]), [address], [place], [address] using a secure Voxah[employer] Video Visit through [employer]. Patient is being seen remotely via telehealth at their home address in [place], and stated they are in a secure environment for this session. The patient's condition being diagnosed/treated is appropriate for telemedicine. The provider identified herself as well as her credentials. The patient, and/or patients guardian, consent to be seen remotely, and when consent is given they understand that the consent allows for patient identifiable information to be sent to a third party as needed. They may refuse to be seen remotely at any time. The electronic data is encrypted and password protected, and the patient and/or guardian has been advised of the potential risks to privacy not withstanding such measures.    You have chosen to receive care through a telehealth visit.  Do you consent to use a video/audio connection for your medical care today? Yes     Patient confirmed consent for today's encounter on 12/7/22.          Subjective   Frank Champion is a 36 y.o. male who is here today for medication management follow up.    Chief Complaint: ADHD, depression, anxiety    History of Present Illness:  The patient describes his mood as \"good\" over the last few weeks.  The patient states that overall he has been doing well over the past few weeks.  He states that his symptoms have continued to be well-controlled.  He states that his depression and anxiety have been somewhat increased recently, but states that this is due to life stressors that he is current dealing with.  He states that he has been addressing recent life stressors in therapy and this has been helpful.  He states that his current medication regimen has continued to be effective for him and he does not want to make any medication adjustments/changes at this visit.  The " patient reports his appetite as good.  The patient reports his sleep as fair.  The patient denies any nightmares or bad dreams.  The patient states that overall he has been sleeping okay recently, but states that he is concerned he may have sleep apnea and requests a referral to sleep medicine.  The patient states that he has been snoring more often and much louder throughout the night in recent months.  He states that he does awaken a few times per night, but states that he doesn't feel he is awakening any more than normal.  He states that it's bother his wife more than anything, and states that she will awaken him through the night to make him stop snoring.  He states that he has noticed he awakens feeling groggy and sleep quality has been poor, so states that he would like to be referred to sleep medicine to rule out possible sleep apnea.  The patient otherwise denies any new medical problems or changes in medications since last appointment with this facility.  The patient reports compliance with current medication regimen.  The patient denies any current side effects from his current medication regimen.  The patient denies any abnormal muscle movements or tics.  The patient rates his ADHD at a 2/10 on a 0-10 scale, with 10 being the worst.  The patient rates his depression at a 4/10 on a 0-10 scale, with 10 being the worst.  The patient rates his anxiety at a 4/10 on a 0-10 scale, with 10 being the worst.  The patient rates hopelessness at a 0/10 on a 0-10 scale with 10 being the worst.  The patient denies suicidal ideations and homicidal ideations, and is convincing.  The patient denies any auditory hallucinations or visual hallucinations.  The patient does not endorse significant symptoms consistent with bipolar disorder or a psychotic illness.              Prior Psychiatric Medications:  Tenex 1 mg nightly. States that this medication caused excessive sedation and daytime drowsiness.   Strattera 40 mg daily.  States this medications was effective for ADHD, but caused severe urinary and sexual side effects that he could not tolerate.  Reports that these side effects began 2-3 weeks after beginning Straterra and stopped about 1 week after discontinuing it.   Adderall XR 15 mg daily - only mildly effective for ADHD  Adderall XR 20 mg daily - significant increase in anxiety and irritability with no improvement in focus and concentration from the 15 mg   Concerta 36 and 54 mg daily - ineffective      The following portions of the patient's history were reviewed and updated as appropriate: allergies, current medications, past family history, past medical history, past social history, past surgical history and problem list.    Review of Systems   Constitutional: Negative for activity change, appetite change, fatigue and unexpected weight change.   Psychiatric/Behavioral: Positive for decreased concentration, dysphoric mood and sleep disturbance. Negative for agitation, behavioral problems, confusion, hallucinations, self-injury and suicidal ideas. The patient is nervous/anxious. The patient is not hyperactive.        Objective   Physical Exam  Constitutional:       Appearance: Normal appearance.   Neurological:      Mental Status: He is alert.   Psychiatric:         Attention and Perception: Attention and perception normal.         Mood and Affect: Affect normal. Mood is anxious and depressed.         Speech: Speech normal.         Behavior: Behavior normal. Behavior is cooperative.         Thought Content: Thought content normal. Thought content does not include homicidal or suicidal ideation. Thought content does not include homicidal or suicidal plan.         Cognition and Memory: Cognition and memory normal.         Judgment: Judgment normal.       There were no vitals taken for this visit.    The patient was seen remotely today via a MyChart Video Visit through [facility].  Unable to obtain vital signs due to nature of remote  visit.  Height stated at 71 inches.  Weight stated at 228 pounds.     Allergies   Allergen Reactions   • Guanfacine Other (See Comments)     sedation   • Strattera [Atomoxetine] Other (See Comments)     Erectile dysfunction, difficulty urinating, urinary incontinence   • Sulfa Antibiotics GI Intolerance       Recent Results (from the past 2016 hour(s))   Hepatitis B Virus (HBV) Screening and Diagnosis    Collection Time: 11/28/22  1:43 PM    Specimen: Blood   Result Value Ref Range    Hepatitis B Surface Ag Negative Negative    Hep B S Ab Reactive     Hep B Core Total Ab Negative Negative    RFX TO HBC IGM Comment     Interpretation Comment    Hepatitis C Antibody    Collection Time: 11/28/22  1:43 PM    Specimen: Blood   Result Value Ref Range    Hepatitis C Ab Non-Reactive Non-Reactive   Chlamydia trachomatis, Neisseria gonorrhoeae, Trichomonas vaginalis, PCR - Urine, Urine, Clean Catch    Collection Time: 11/28/22  1:43 PM    Specimen: Urine, Clean Catch   Result Value Ref Range    Chlamydia trachomatis, JAZMINE Negative Negative    Gonococcus by JAZMINE Negative Negative    Trichomonas vaginosis Negative Negative   RPR    Collection Time: 11/28/22  1:43 PM    Specimen: Blood   Result Value Ref Range    RPR Non-Reactive Non-Reactive   HIV-1 / O / 2 Ag / Antibody 4th Generation    Collection Time: 11/28/22  1:43 PM    Specimen: Blood   Result Value Ref Range    HIV-1/ HIV-2 Non-Reactive Non-Reactive       Current Medications:   Current Outpatient Medications   Medication Sig Dispense Refill   • amLODIPine (NORVASC) 2.5 MG tablet Take 1 tablet by mouth Every Night. 90 tablet 0   • buPROPion XL (WELLBUTRIN XL) 300 MG 24 hr tablet Take 1 tablet by mouth Daily. 90 tablet 0   • lisdexamfetamine (VYVANSE) 60 MG capsule Take 1 capsule by mouth Every Morning 30 capsule 0   • losartan (COZAAR) 100 MG tablet Take 1 tablet by mouth Daily. 90 tablet 0     No current facility-administered medications for this visit.       Mental  Status Exam:   Hygiene:   good  Cooperation:  Cooperative  Eye Contact:  Good  Psychomotor Behavior:  Appropriate  Affect:  Full range  Hopelessness: Denies  Speech:  Normal  Thought Process:  Goal directed and Linear  Thought Content:  Normal  Suicidal:  None  Homicidal:  None  Hallucinations:  None  Delusion:  None  Memory:  Intact  Orientation:  Person, Place, Time and Situation  Reliability:  good  Insight:  Good  Judgement:  Good  Impulse Control:  Good  Physical/Medical Issues:  Yes See medical history         The ROSA report, request number [id number], of the past 12 months were reviewed and is appropriate.  The patient/guardian reports taking the medication only as prescribed.  The patient/guardian denies any abuse or misuse of the medication.  The patient/guardian denies any other substance use or issues.  There are no apparent substance related issues.  The patient reports no side effects of the current medication usage.  The patient/guardian has reported significant improvement with medication usage and wishes to continue medication as prescribed.  The patient/guardian is appropriate to continue with current medication usage at this time.  Reinforced risks and side effects of medication usage, patient and/or guardian verbalize understanding in their own words and are in agreement with current plan.      12-lead ECG completed on 3/9/22 and confirmed by Jorge Castanon MD was reviewed by this APRN at today's encounter.  No abnormalities noted to rule out patient's candidacy for continuation of stimulant medication for treatment of ADHD at this time.  Discussed with patient that we plan to periodically repeat ECG testing in the future for continued monitoring of patient's candidacy for continued stimulant use, and will repeat ECG testing sooner if there are any changes in the patient's medical history or as needed/any concerns arise.  The patient verbalizes understanding and agreement in their own  words.      UDS completed 7/12/22 reviewed by this APRN at today's encounter.           Assessment & Plan   Diagnoses and all orders for this visit:    1. ADHD, adult residual type (Primary)  -     lisdexamfetamine (VYVANSE) 60 MG capsule; Take 1 capsule by mouth Every Morning  Dispense: 30 capsule; Refill: 0    2. Generalized anxiety disorder  -     buPROPion XL (WELLBUTRIN XL) 300 MG 24 hr tablet; Take 1 tablet by mouth Daily.  Dispense: 90 tablet; Refill: 0    3. Mild episode of recurrent major depressive disorder (HCC)  -     buPROPion XL (WELLBUTRIN XL) 300 MG 24 hr tablet; Take 1 tablet by mouth Daily.  Dispense: 90 tablet; Refill: 0    4. Suspected sleep apnea  -     Ambulatory Referral to Sleep Medicine            Visit Diagnoses:    ICD-10-CM ICD-9-CM   1. ADHD, adult residual type  F90.8 314.01   2. Generalized anxiety disorder  F41.1 300.02   3. Mild episode of recurrent major depressive disorder (HCC)  F33.0 296.31   4. Suspected sleep apnea  R29.818 781.99       GOALS:  Short Term Goals: Patient will be compliant with medication, and patient will have no significant medication related side effects.  Patient will be engaged in psychotherapy as indicated.  Patient will report subjective improvement of symptoms.  Long term goals: To stabilize mood and treat/improve subjective symptoms, the patient will stay out of the hospital, the patient will be at an optimal level of functioning, and the patient will take all medications as prescribed.  The patient verbalized understanding and agreement with goals that were mutually set.      SUICIDE RISK ASSESSMENT: Unalterable demographics and a history of mental health intervention indicate this patient is in a high risk category compared to the general population. At present, the patient denies active SI/HI, intentions, or plans at this time and agrees to seek immediate care should such thoughts develop. The patient verbalizes understanding of how to access  emergency care if needed and agrees to do so. Consideration of suicide risk and protective factors such as history, current presentation, individual strengths and weaknesses, psychosocial and environmental stressors and variables, psychiatric illness and symptoms, medical conditions and pain, took place in this interview. Based on those considerations, the patient is determined: within individual baseline and presenting no imminent risk for suicide or homicide. Other recommendations: The patient does not meet the criteria for inpatient admission and is not a safety risk to self or others at today's visit. Inpatient treatment offers no significant advantages over outpatient treatment for this patient at today's visit.      SAFETY PLAN:  Patient was given ample time for questions and fully participated in treatment planning.  Patient was encouraged to call the clinic with any questions or concerns.  Patient was informed of access to emergency care. If patient were to develop any significant symptomatology, suicidal ideation, homicidal ideation, any concerns, or feel unsafe at any time they are to call the clinic and if unable to get immediate assistance should immediately call 911 or go to the nearest emergency room.  The patient is advised to remove or secure (lock away) all lethal weapons (including guns) and sharps (including razors, scissors, knives, etc.).  All medications (including any prescribed and any over the counter medications) should be stored in a safe and secured location that is not obtainable by children/adolescents.  Patient was given an opportunity and encouraged to ask questions about their medication, illness, and treatment. Patient contracted verbally for the following: If you are experiencing an emotional crisis or have thoughts of harming yourself or others, please go to your nearest local emergency room or call 911. Will continue to re-assess medication response and side effects frequently to  establish efficacy and ensure safety. Risks, any black box warnings, side effects, off label usage, and benefits of medication and treatment discussed with patient, along with potential adverse side effects of current and/or newly prescribed medication, alternative treatment options, and OTC medications.  Patient verbalized understanding of potential risks, any off label use of medication, any black box warnings, and any side effects in their own words. The patient verbalized understanding and agreed to comply with the safety plan discussed in their own words.  Patient given the number to the office. Number also available to the 24- hour suicide hotline.      TREATMENT PLAN/GOALS: Continue medications and treatment plan as indicated. Treatment and medication options discussed during today's visit. Patient acknowledged and verbally consented to continue with current treatment plan and was educated on the importance of compliance with treatment and follow-up appointments.        -Continue Vyvanse 60 mg daily for ADHD  -Continue Wellbutrin  mg daily for depression/anxiety/ADHD  -Referral to sleep medicine placed at today's encounter per patient's request for suspected sleep apnea  -Discussed with the patient that stimulants can only be prescribed up to a 30-day supply with no refills by this APRN.  Discussed with the patient that since next follow-up visit with this APRN is scheduled for approximately 3 months, they will have to request a refill for stimulant(s) prior to next scheduled follow-up appointment.  Instructed patient to request a refill approximately 4-5 days before running out of medication via the [employer] laxmi or by calling the clinic to ensure that refill can be sent in an appropriate time and they do not run out of medication.  The patient verbalizes understanding and endorses that they are agreeable to this.        MEDICATION ISSUES: Discussed medication options and treatment plan of prescribed  medication, any off label use of medication, as well as the risks, benefits, any black box warnings including increased suicidality, and side effects including but not limited to potential falls, dizziness, possible impaired driving, GI side effects (change in appetite, abdominal discomfort, nausea, vomiting, diarrhea, and/or constipation), dry mouth, somnolence, sedation, insomnia, activation, agitation, irritation, tremors, abnormal muscle movements or disorders, headache, sweating, possible bruising or rare bleeding, electrolyte and/or fluid abnormalities, change in blood pressure/heart rate/and or heart rhythm, sexual dysfunction, and metabolic adversities among others. Patient and/or guardian agreeable to call the office with any worsening of symptoms or onset of side effects, or if any concerns or questions arise.  The contact information for the office is made available to the patient and/or guardian.  Patient and/or guardian agreeable to call 911 or go to the nearest ER should they begin having any SI/HI, or if any urgent concerns arise. No medication side effects or related complaints today.    The patient is being prescribed a controlled substance as part of the treatment plan. The patient/guardian has been educated of appropriate use of the medication(s), including risks and side effects such as insomnia, headache, exacerbation of tics, nervousness, irritability, overstimulation, tremor, dizziness, anorexia or change in appetite, nausea, dry mouth, constipation, diarrhea, weight loss, sexual dysfunction, psychotic episodes, seizures, palpitations, tachycardia, hypertension, rare activation (activation of hypomania, chelsie, and/or suicidal ideations), cardiovascular adverse effects including sudden death (especially patients with pre-existing structural abnormalities often associated with a family history of cardiac disease), may slow normal growth in children, weight gain is reported but not expected,  sedation is possible but activation is much more common, metabolic adversities, and overdose among others. Patient/guardian is also informed that the medication is to be used by the patient only, the medication is to be used only as directed, and the medication should not be combined with other substances unless directed by a Provider/Prescriber. The patient/guardian verbalized understanding and agreement with this in their own words, and wish to continue with current treatment plan.    Without the prescribed medication for ADHD, it is reported that the patient has problems with attention and focus including being easily distracted, easily losing objects, trouble with time management, trouble completing tasks because of distractions, procrastination, indecisiveness, careless mistakes in daily activities/work/school, and not finishing jobs that are started.  Patient denies any side effects, no worsening of insomnia, and no worsening of anxiety on the medication dose.  Due to the reported problems without the medication usage, as well as the significant improvement in symptoms with the medication usage, the patient requests to remain on the prescribed medication for ADHD.                    [facility] No Show Policy:  We understand unexpected circumstances arise; however, anytime you miss your appointment we are unable to provide you appropriate care.  In addition, each appointment missed could have been used to provide care for others.  We ask that you call at least 24 hours in advance to cancel or reschedule an appointment.  We would like to take this opportunity to remind you of our policy stating patients who miss THREE or more appointments without cancelling or rescheduling 24 hours in advance of the appointment may be subject to cancellation of any further visits with our clinic and recommendation to seek in-person services/visits.    Please call [phone] to reschedule your  appointment. If there are reasons that make it difficult for you to keep the appointments, please call and let us know how we can help.  Please understand that medication prescribing will not continue without seeing your provider.      [facility]'s No Show Policy reviewed with patient at today's visit. Patient verbalized understanding of this policy. Discussed with patient that in the event that there are three or more no show visits, it will be recommended that they pursue in-person services/visits as noncompliance with telehealth visits indicates that patient is not an appropriate candidate for telemedicine and would likely be more appropriate for in-person services/visits. Patient verbalizes understanding and is agreeable to this.      MEDS ORDERED DURING VISIT:  New Medications Ordered This Visit   Medications   • buPROPion XL (WELLBUTRIN XL) 300 MG 24 hr tablet     Sig: Take 1 tablet by mouth Daily.     Dispense:  90 tablet     Refill:  0   • lisdexamfetamine (VYVANSE) 60 MG capsule     Sig: Take 1 capsule by mouth Every Morning     Dispense:  30 capsule     Refill:  0       Return in about 3 months (around 3/7/2023), or if symptoms worsen or fail to improve, for Recheck.       Patient will follow-up in 3 months, highly encouraged the patient if he had any questions or concerns to contact the behavioral health virtual clinic for sooner appointment patient verbalized understanding.      Functional Status: Mild impairment     Prognosis: Good with Ongoing Treatment             This document has been electronically signed by BETTY Sandy  December 7, 2022 17:26 EST       Some of the data in this electronic note has been brought forward from a previous encounter, any necessary changes have been made, it has been reviewed by this APRN, and it is accurate.      Part of this note may be an electronic transcription/translation of spoken language to printed text using the Dragon Dictation  System.

## 2022-12-15 ENCOUNTER — OFFICE VISIT (OUTPATIENT)
Dept: SLEEP MEDICINE | Facility: HOSPITAL | Age: 37
End: 2022-12-15

## 2022-12-15 VITALS
HEART RATE: 109 BPM | WEIGHT: 232 LBS | BODY MASS INDEX: 32.48 KG/M2 | HEIGHT: 71 IN | OXYGEN SATURATION: 98 % | DIASTOLIC BLOOD PRESSURE: 84 MMHG | SYSTOLIC BLOOD PRESSURE: 144 MMHG

## 2022-12-15 DIAGNOSIS — G47.34 NOCTURNAL HYPOXEMIA: ICD-10-CM

## 2022-12-15 DIAGNOSIS — G47.33 OSA (OBSTRUCTIVE SLEEP APNEA): Primary | ICD-10-CM

## 2022-12-15 PROCEDURE — 99213 OFFICE O/P EST LOW 20 MIN: CPT | Performed by: NURSE PRACTITIONER

## 2022-12-15 RX ORDER — CETIRIZINE HYDROCHLORIDE 10 MG/1
10 TABLET ORAL DAILY
COMMUNITY

## 2022-12-15 RX ORDER — FAMOTIDINE 10 MG
10 TABLET ORAL 2 TIMES DAILY
COMMUNITY

## 2022-12-15 NOTE — PROGRESS NOTES
Chief Complaint:   Chief Complaint   Patient presents with   • Follow-up       HPI:    Frank Champion is a 36 y.o. male here for follow-up of sleep apnea.  Patient was seen in consult 10/26/2020 for frequent nighttime awakenings, snoring, and gasping.  He had a sleep study 3/10/2020 that showed mild obstructive sleep apnea as well as nocturnal hypoxemia.  Patient was not seen back in clinic since that time.  Patient states he turned his machine in over a year ago as he could not get used to it.  He states now that he is back to work his symptoms are affecting his work life and would like to restart.  He is currently sleeping 7 hours nightly and does wake up multiple times in the night.  He has an Rio score of 13/24.  We will now move forward with Alfredo ANDREW to restart CPAP therapy.        Current medications are:   Current Outpatient Medications:   •  amLODIPine (NORVASC) 2.5 MG tablet, Take 1 tablet by mouth Every Night., Disp: 90 tablet, Rfl: 0  •  buPROPion XL (WELLBUTRIN XL) 300 MG 24 hr tablet, Take 1 tablet by mouth Daily., Disp: 90 tablet, Rfl: 0  •  cetirizine (zyrTEC) 10 MG tablet, Take 10 mg by mouth Daily., Disp: , Rfl:   •  famotidine (PEPCID) 10 MG tablet, Take 10 mg by mouth 2 (Two) Times a Day., Disp: , Rfl:   •  lisdexamfetamine (VYVANSE) 60 MG capsule, Take 1 capsule by mouth Every Morning, Disp: 30 capsule, Rfl: 0  •  losartan (COZAAR) 100 MG tablet, Take 1 tablet by mouth Daily., Disp: 90 tablet, Rfl: 0.      The patient's relevant past medical, surgical, family and social history were reviewed and updated in Epic as appropriate.       Review of Systems   HENT: Positive for sore throat.    Eyes: Positive for visual disturbance.   Respiratory: Positive for apnea.    Gastrointestinal:        Heartburn   Allergic/Immunologic: Positive for environmental allergies.   Psychiatric/Behavioral: Positive for decreased concentration, dysphoric mood and sleep disturbance. The patient is  "nervous/anxious.    All other systems reviewed and are negative.        Objective:    Physical Exam  Constitutional:       Appearance: Normal appearance.   HENT:      Head: Normocephalic and atraumatic.      Mouth/Throat:      Comments: Mallampati 4 anatomy  Cardiovascular:      Rate and Rhythm: Regular rhythm. Tachycardia present.   Pulmonary:      Effort: Pulmonary effort is normal.      Breath sounds: Normal breath sounds.   Skin:     General: Skin is warm and dry.   Neurological:      Mental Status: He is alert and oriented to person, place, and time.   Psychiatric:         Mood and Affect: Mood normal.         Behavior: Behavior normal.         Thought Content: Thought content normal.         Judgment: Judgment normal.       /84   Pulse 109   Ht 180.3 cm (71\")   Wt 105 kg (232 lb)   SpO2 98%   BMI 32.36 kg/m²         ASSESSMENT/PLAN    Diagnoses and all orders for this visit:    1. MARLINE (obstructive sleep apnea) (Primary)  -     PAP Therapy    2. Nocturnal hypoxemia        1. Counseled patient regarding multimodal approach with healthy nutrition, healthy sleep, regular physical activity, social activities, counseling, and medications. Encouraged to practice lateral sleep position. Avoid alcohol and sedatives close to bedtime.  2.   Order to initiate CPAP therapy I will see patient back in 31 to 90 days once AHI is under control and he is compliant we will need to do overnight oximetry while wearing CPAP to make sure nocturnal hypoxemia has been corrected.  I did spend 15 minutes with patient discussing sleep apnea and possible choices for him.  I have answered all his questions.    I have reviewed the results of my evaluation and impression and discussed my recommendations in detail with the patient.      Signed by  BETTY Georges    December 15, 2022      CC: Magaly Taylor MD Lebron, Allison, APRN      "

## 2023-01-03 ENCOUNTER — PATIENT MESSAGE (OUTPATIENT)
Dept: FAMILY MEDICINE CLINIC | Facility: CLINIC | Age: 38
End: 2023-01-03
Payer: COMMERCIAL

## 2023-01-04 ENCOUNTER — OFFICE VISIT (OUTPATIENT)
Dept: FAMILY MEDICINE CLINIC | Facility: CLINIC | Age: 38
End: 2023-01-04
Payer: COMMERCIAL

## 2023-01-04 VITALS
WEIGHT: 233.4 LBS | HEART RATE: 123 BPM | HEIGHT: 71 IN | BODY MASS INDEX: 32.68 KG/M2 | SYSTOLIC BLOOD PRESSURE: 130 MMHG | OXYGEN SATURATION: 97 % | DIASTOLIC BLOOD PRESSURE: 70 MMHG

## 2023-01-04 DIAGNOSIS — M54.12 CERVICAL RADICULOPATHY: Primary | ICD-10-CM

## 2023-01-04 PROCEDURE — 96372 THER/PROPH/DIAG INJ SC/IM: CPT | Performed by: FAMILY MEDICINE

## 2023-01-04 PROCEDURE — 1160F RVW MEDS BY RX/DR IN RCRD: CPT | Performed by: FAMILY MEDICINE

## 2023-01-04 PROCEDURE — 3078F DIAST BP <80 MM HG: CPT | Performed by: FAMILY MEDICINE

## 2023-01-04 PROCEDURE — 1159F MED LIST DOCD IN RCRD: CPT | Performed by: FAMILY MEDICINE

## 2023-01-04 PROCEDURE — 99214 OFFICE O/P EST MOD 30 MIN: CPT | Performed by: FAMILY MEDICINE

## 2023-01-04 PROCEDURE — 3075F SYST BP GE 130 - 139MM HG: CPT | Performed by: FAMILY MEDICINE

## 2023-01-04 RX ORDER — GABAPENTIN 600 MG/1
600 TABLET ORAL 3 TIMES DAILY
Qty: 90 TABLET | Refills: 0 | Status: SHIPPED | OUTPATIENT
Start: 2023-01-04 | End: 2023-02-08

## 2023-01-04 RX ORDER — METHYLPREDNISOLONE ACETATE 40 MG/ML
40 INJECTION, SUSPENSION INTRA-ARTICULAR; INTRALESIONAL; INTRAMUSCULAR; SOFT TISSUE ONCE
Status: COMPLETED | OUTPATIENT
Start: 2023-01-04 | End: 2023-01-04

## 2023-01-04 RX ADMIN — METHYLPREDNISOLONE ACETATE 40 MG: 40 INJECTION, SUSPENSION INTRA-ARTICULAR; INTRALESIONAL; INTRAMUSCULAR; SOFT TISSUE at 13:15

## 2023-01-04 NOTE — PROGRESS NOTES
Chief Complaint  Neck Pain (Pinched nerve in neck causing extreme pain radiating down right arm, pointer and middle finger and losing sensation)    Subjective        Frank Champion presents to [facility]  Neck Pain     Answers for HPI/ROS submitted by the patient on 1/3/2023  What is the primary reason for your visit?: Other  Please describe your symptoms.: Pain in bottom right of neck that is shooting down right arm through the top of the shoulder, tricep area, back of elbow, middle of forearm, and causing numbness in index and middle finger.  Have you had these symptoms before?: Yes  How long have you been having these symptoms?: 5-7 days  Please list any medications you are currently taking for this condition.: Cyclobenzeprine, Aleive, Tylenol.  Please describe any probable cause for these symptoms. : Cervical radiculopathy of the C7 nerve root based on the presentation of my symptoms. An x-ray conducted on 7/27/22 shows cervical spine straightening and mild degenerative disc disease, which is more than likely the cause of my current pain.    Symptom onset 10 days ago. Similar to this Summer. Pain in his neck radiates down arm into 2nd-3rd fingers with numbness or tingling. Pain 8-9/10 morning and night. No improvement with Tylenol or ibuprofen. He used a family member's gabapentin 800mg which helped his pain. Affecting work and home life. No known injury. He went to PT and continued stretches. Chiropractor didn't help today.  He reports that his heart rate is likely elevated today because he is in so much pain.      Objective   Vital Signs:  /70   Pulse (!) 123   Ht 180.3 cm (70.98\")   Wt 106 kg (233 lb 6.4 oz)   SpO2 97%   BMI 32.57 kg/m²   Estimated body mass index is 32.57 kg/m² as calculated from the following:    Height as of this encounter: 180.3 cm (70.98\").    Weight as of this encounter: 106 kg (233 lb 6.4 oz).          Physical Exam  Vitals reviewed.    Constitutional:       General: He is not in acute distress.     Appearance: He is not ill-appearing.   Cardiovascular:      Rate and Rhythm: Regular rhythm. Tachycardia present.   Pulmonary:      Effort: Pulmonary effort is normal.      Breath sounds: Normal breath sounds.   Musculoskeletal:      Cervical back: No edema, rigidity, spasms, torticollis, tenderness or bony tenderness. Pain with movement (flexion) present. Decreased range of motion ( lateral rotation and flexion).      Comments: Bilateral  strength intact   Neurological:      Mental Status: He is alert.        Result Review :  The following data was reviewed by: Magaly Taylor MD on 01/04/2023:         XR Spine Cervical 3 View (07/27/2022 11:20)          Administrations This Visit     methylPREDNISolone acetate (DEPO-medrol) injection 40 mg     Admin Date  01/04/2023 Action  Given Dose  40 mg Route  Intramuscular Administered By  Rody Steinberg MA            He tolerated the injection well.    Assessment and Plan   Diagnoses and all orders for this visit:    1. Cervical radiculopathy (Primary)  -     MRI Cervical Spine Without Contrast; Future  -     methylPREDNISolone acetate (DEPO-medrol) injection 40 mg  -     gabapentin (NEURONTIN) 600 MG tablet; Take 1 tablet by mouth 3 (Three) Times a Day for 30 days.  Dispense: 90 tablet; Refill: 0      Worse.  I recommend a course of prednisone but patient declined and would prefer a shot in the office.  Discussed shot duration of action is temporary.  He needs evaluation with MRI to follow-up on his worsening symptoms and severe pain with abnormal x-rays as well as previously completing physical therapy.  NSAIDs and over-the-counter analgesics ineffective for pain control.  I provided him with a short-term prescription for gabapentin.  If symptoms continue long-term he will need to follow-up in the office at regular intervals for monitoring of controlled substance.       Follow Up   Return if  symptoms worsen or fail to improve.  Patient was given instructions and counseling regarding his condition or for health maintenance advice. Please see specific information pulled into the AVS if appropriate.     Electronically signed by Magaly Taylor MD, 01/04/23, 2:02 PM EST.

## 2023-01-05 ENCOUNTER — APPOINTMENT (OUTPATIENT)
Dept: MRI IMAGING | Facility: HOSPITAL | Age: 38
End: 2023-01-05
Payer: COMMERCIAL

## 2023-01-05 ENCOUNTER — TELEPHONE (OUTPATIENT)
Dept: SLEEP MEDICINE | Facility: HOSPITAL | Age: 38
End: 2023-01-05
Payer: COMMERCIAL

## 2023-01-05 NOTE — TELEPHONE ENCOUNTER
Per huy...      Patient had cpap previously for 3-6 months and could not tolerate it.  He will need in lab sleep study either a split night or a titration.

## 2023-01-06 DIAGNOSIS — G47.33 OSA (OBSTRUCTIVE SLEEP APNEA): Primary | ICD-10-CM

## 2023-01-15 ENCOUNTER — PATIENT MESSAGE (OUTPATIENT)
Dept: FAMILY MEDICINE CLINIC | Facility: CLINIC | Age: 38
End: 2023-01-15
Payer: COMMERCIAL

## 2023-01-24 DIAGNOSIS — F90.8 ADHD, ADULT RESIDUAL TYPE: Chronic | ICD-10-CM

## 2023-02-06 DIAGNOSIS — M54.12 CERVICAL RADICULOPATHY: ICD-10-CM

## 2023-02-07 RX ORDER — GABAPENTIN 600 MG/1
600 TABLET ORAL 3 TIMES DAILY
Qty: 90 TABLET | Refills: 0 | OUTPATIENT
Start: 2023-02-07 | End: 2023-03-09

## 2023-02-07 NOTE — TELEPHONE ENCOUNTER
"Vm full    Magaly Taylor MD 2 minutes ago (11:06 AM)       Per last visit \" I provided him with a short-term prescription for gabapentin.  If symptoms continue long-term he will need to follow-up in the office at regular intervals for monitoring of controlled substance.\"  Please schedule an office visit for neck pain.     Okay for hub to relay the message.            Note          "

## 2023-02-07 NOTE — TELEPHONE ENCOUNTER
"Per last visit \" I provided him with a short-term prescription for gabapentin.  If symptoms continue long-term he will need to follow-up in the office at regular intervals for monitoring of controlled substance.\"  Please schedule an office visit for neck pain.    Okay for hub to relay the message.    "

## 2023-02-07 NOTE — TELEPHONE ENCOUNTER
Rx Refill Note  Requested Prescriptions     Pending Prescriptions Disp Refills   • gabapentin (NEURONTIN) 600 MG tablet 90 tablet 0     Sig: Take 1 tablet by mouth 3 (Three) Times a Day for 30 days.      Last office visit with prescribing clinician: 1/4/2023   Last telemedicine visit with prescribing clinician: Visit date not found   Next office visit with prescribing clinician: Visit date not found                         Would you like a call back once the refill request has been completed: [] Yes [] No    If the office needs to give you a call back, can they leave a voicemail: [] Yes [] No    Rody Steinberg MA  02/07/23, 09:06 EST     CSA 7/21/20  UDS 5/24/21

## 2023-02-08 ENCOUNTER — OFFICE VISIT (OUTPATIENT)
Dept: FAMILY MEDICINE CLINIC | Facility: CLINIC | Age: 38
End: 2023-02-08
Payer: COMMERCIAL

## 2023-02-08 VITALS
HEART RATE: 60 BPM | SYSTOLIC BLOOD PRESSURE: 132 MMHG | DIASTOLIC BLOOD PRESSURE: 78 MMHG | BODY MASS INDEX: 32.48 KG/M2 | HEIGHT: 71 IN | WEIGHT: 232 LBS | OXYGEN SATURATION: 97 %

## 2023-02-08 DIAGNOSIS — M54.12 CERVICAL RADICULOPATHY: Primary | ICD-10-CM

## 2023-02-08 PROBLEM — Z79.899 CONTROLLED SUBSTANCE AGREEMENT SIGNED: Status: RESOLVED | Noted: 2020-07-21 | Resolved: 2023-02-08

## 2023-02-08 PROCEDURE — 99213 OFFICE O/P EST LOW 20 MIN: CPT | Performed by: FAMILY MEDICINE

## 2023-02-08 RX ORDER — MELOXICAM 15 MG/1
15 TABLET ORAL DAILY PRN
Qty: 30 TABLET | Refills: 11 | Status: SHIPPED | OUTPATIENT
Start: 2023-02-08

## 2023-02-08 NOTE — PROGRESS NOTES
"Chief Complaint  Neck Pain    Subjective        Frank Champion presents to [facility]  History of Present Illness    He presents today for follow-up of neck and arm pain.  At his last visit in January he was started on gabapentin. MRI cervical spine is scheduled for 2/23/2023. Two weeks ago he tried to stop gabapentin and he hurt. His last dose was yesterday morning. He doesn't want to continue on controlled medication. He wants to restart meloxicam for pain control. He didn't have improvement with steroid injection. Current has pain deeply in right forearm 2/10 pain. Also using Tylenol and ibuprofen. Neck is still sore and worse when looking down. Spasms down his right arm. Also elbow and tricep pain. Numbness in index and middle fingertips. Using tricep muscle it is weaker.  strength intact.        Objective   Vital Signs:  /78   Pulse 60   Ht 180.3 cm (70.98\")   Wt 105 kg (232 lb)   SpO2 97%   BMI 32.37 kg/m²   Estimated body mass index is 32.37 kg/m² as calculated from the following:    Height as of this encounter: 180.3 cm (70.98\").    Weight as of this encounter: 105 kg (232 lb).             Physical Exam  Vitals reviewed.   Constitutional:       General: He is not in acute distress.     Appearance: He is not ill-appearing.   Cardiovascular:      Rate and Rhythm: Normal rate and regular rhythm.   Pulmonary:      Effort: Pulmonary effort is normal.      Breath sounds: Normal breath sounds.   Musculoskeletal:      Right shoulder: Normal strength.      Left shoulder: Normal strength.      Right hand: Normal strength.      Left hand: Normal strength.      Cervical back: No rigidity or torticollis. Pain with movement present. No spinous process tenderness or muscular tenderness. Decreased range of motion ( pain with flexion).   Neurological:      Mental Status: He is alert.        Result Review :  The following data was reviewed by: Magaly Taylor MD on " 02/08/2023:      XR Spine Cervical 3 View (07/27/2022 11:20)  Fernando report reviewed.            Assessment and Plan   Diagnoses and all orders for this visit:    1. Cervical radiculopathy (Primary)  -     meloxicam (MOBIC) 15 MG tablet; Take 1 tablet by mouth Daily As Needed for Moderate Pain.  Dispense: 30 tablet; Refill: 11  He no longer wants to take controlled medications and gabapentin discontinued.  At this time start meloxicam.  Discussed side effects to monitor for.  Keep appointment for MRI for further evaluation.  Discussed if abnormalities on MRI to consult with neurosurgery for best next step in treatment.           Follow Up   Return if symptoms worsen or fail to improve.  Patient was given instructions and counseling regarding his condition or for health maintenance advice. Please see specific information pulled into the AVS if appropriate.     Electronically signed by Magaly Taylor MD, 02/08/23, 11:48 AM EST.

## 2023-02-18 ENCOUNTER — HOSPITAL ENCOUNTER (OUTPATIENT)
Dept: MRI IMAGING | Facility: HOSPITAL | Age: 38
Discharge: HOME | End: 2023-02-18
Admitting: FAMILY MEDICINE
Payer: COMMERCIAL

## 2023-02-18 DIAGNOSIS — M54.12 CERVICAL RADICULOPATHY: ICD-10-CM

## 2023-02-18 PROCEDURE — 72141 MRI NECK SPINE W/O DYE: CPT

## 2023-02-21 ENCOUNTER — HOSPITAL ENCOUNTER (OUTPATIENT)
Dept: SLEEP MEDICINE | Facility: HOSPITAL | Age: 38
Discharge: HOME | End: 2023-02-21
Admitting: NURSE PRACTITIONER
Payer: COMMERCIAL

## 2023-02-21 VITALS — WEIGHT: 231.48 LBS | HEIGHT: 71 IN | BODY MASS INDEX: 32.41 KG/M2

## 2023-02-21 DIAGNOSIS — G47.33 OSA (OBSTRUCTIVE SLEEP APNEA): ICD-10-CM

## 2023-02-21 PROCEDURE — 95806 SLEEP STUDY UNATT&RESP EFFT: CPT

## 2023-02-23 ENCOUNTER — TELEPHONE (OUTPATIENT)
Dept: FAMILY MEDICINE CLINIC | Facility: CLINIC | Age: 38
End: 2023-02-23
Payer: COMMERCIAL

## 2023-02-23 DIAGNOSIS — M54.12 CERVICAL RADICULOPATHY: ICD-10-CM

## 2023-02-23 DIAGNOSIS — G47.33 OBSTRUCTIVE SLEEP APNEA, ADULT: Primary | ICD-10-CM

## 2023-02-23 DIAGNOSIS — M50.30 BULGING OF CERVICAL INTERVERTEBRAL DISC: Primary | ICD-10-CM

## 2023-02-23 NOTE — TELEPHONE ENCOUNTER
Contacted patient to discuss, he reports that he would like to consult a specialist for MRI abnormalities but needs a referral to [facility]

## 2023-02-23 NOTE — TELEPHONE ENCOUNTER
Caller: Frank Champion    Relationship: Self    Best call back number: [phone]    What is the best time to reach you: ANYTIME    Who are you requesting to speak with (clinical staff, provider,  specific staff member): DR LANGLEY    What was the call regarding: PATIENT WOULD LIKE TO KNOW WHAT HIS NEXT STEPS ARE BASED ON THE RESULTS OF HIS MRI?    Do you require a callback: YES

## 2023-02-24 NOTE — PROGRESS NOTES
CALLED PATIENT AND ADVISED OF STUDY RESULTS. PATIENT VERBALIZED UNDERSTANDING AND WAS AGREEABLE TO PAP THERAPY. FAXED ORDER TO [facility] 02/24/23 TRC

## 2023-02-28 DIAGNOSIS — F90.8 ADHD, ADULT RESIDUAL TYPE: Chronic | ICD-10-CM

## 2023-02-28 DIAGNOSIS — I10 ESSENTIAL HYPERTENSION: Chronic | ICD-10-CM

## 2023-02-28 RX ORDER — AMLODIPINE BESYLATE 2.5 MG/1
2.5 TABLET ORAL NIGHTLY
Qty: 90 TABLET | Refills: 0 | Status: SHIPPED | OUTPATIENT
Start: 2023-02-28

## 2023-02-28 RX ORDER — LOSARTAN POTASSIUM 100 MG/1
100 TABLET ORAL DAILY
Qty: 90 TABLET | Refills: 0 | Status: SHIPPED | OUTPATIENT
Start: 2023-02-28

## 2023-02-28 NOTE — TELEPHONE ENCOUNTER
Rx Refill Note  Requested Prescriptions     Pending Prescriptions Disp Refills   • losartan (COZAAR) 100 MG tablet 90 tablet 0     Sig: Take 1 tablet by mouth Daily.   • amLODIPine (NORVASC) 2.5 MG tablet 90 tablet 0     Sig: Take 1 tablet by mouth Every Night.      Last office visit with prescribing clinician: 2/8/2023   Last telemedicine visit with prescribing clinician: Visit date not found   Next office visit with prescribing clinician: Visit date not found                         Would you like a call back once the refill request has been completed: [] Yes [] No    If the office needs to give you a call back, can they leave a voicemail: [] Yes [] No    Rody Steinberg MA  02/28/23, 11:45 EST

## 2023-03-02 ENCOUNTER — TELEMEDICINE (OUTPATIENT)
Dept: PSYCHIATRY | Facility: CLINIC | Age: 38
End: 2023-03-02
Payer: COMMERCIAL

## 2023-03-02 DIAGNOSIS — F33.0 MILD EPISODE OF RECURRENT MAJOR DEPRESSIVE DISORDER: Chronic | ICD-10-CM

## 2023-03-02 DIAGNOSIS — F41.1 GENERALIZED ANXIETY DISORDER: Chronic | ICD-10-CM

## 2023-03-02 DIAGNOSIS — F90.8 ADHD, ADULT RESIDUAL TYPE: Primary | Chronic | ICD-10-CM

## 2023-03-02 PROCEDURE — 99214 OFFICE O/P EST MOD 30 MIN: CPT | Performed by: NURSE PRACTITIONER

## 2023-03-02 RX ORDER — BUPROPION HYDROCHLORIDE 300 MG/1
300 TABLET ORAL DAILY
Qty: 90 TABLET | Refills: 0 | Status: SHIPPED | OUTPATIENT
Start: 2023-03-02

## 2023-03-02 NOTE — PROGRESS NOTES
"This provider is located at the [facility] (through ), [address], [facility], [address] using a secure [employer] Video Visit through Copyright Agent. Patient is being seen remotely via telehealth at their home address in [place], and stated they are in a secure environment for this session. The patient's condition being diagnosed/treated is appropriate for telemedicine. The provider identified herself as well as her credentials. The patient, and/or patients guardian, consent to be seen remotely, and when consent is given they understand that the consent allows for patient identifiable information to be sent to a third party as needed. They may refuse to be seen remotely at any time. The electronic data is encrypted and password protected, and the patient and/or guardian has been advised of the potential risks to privacy not withstanding such measures.    You have chosen to receive care through a telehealth visit.  Do you consent to use a video/audio connection for your medical care today? Yes     Patient identifiers utilized: Name and date of birth.    Patient verbally confirmed consent for today's encounter 3/2/23.        Subjective   Frank Champion is a 37 y.o. male who is here today for medication management follow up.    Chief Complaint: ADHD, depression, anxiety    History of Present Illness:  The patient describes his mood as \"okay\" over the last few weeks.  The patient endorses that overall he has been doing well over the past few months.  He states that his symptoms have continued to be well controlled and that his current treatment regimen has continued to be effective for him.  He states that he was recently diagnosed with sleep apnea and has been prescribed a CPAP, but states that he has not received it yet.  He states that he has continued to have a little difficulty with waking up in the nighttime due to snoring, but endorses that he is hopeful that this " Pt aware   will improve once he begins using his CPAP.  He denies any difficulties falling asleep, and states that he can typically fall back asleep easily when he does awaken at nighttime due to snoring.  The patient denies any nightmares or bad dreams.  The patient reports his appetite as good.  The patient denies any new medical problems or changes in medications since last appointment with this facility.  The patient reports compliance with current medication regimen.  The patient denies any current side effects from his current medication regime, other than recent diagnosis of sleep apnea.  The patient denies any abnormal muscle movements or tics.  The patient rates his ADHD at a 3-4/10 on a 0-10 scale, with 10 being the worst.  The patient rates his depression at a 2/10 on a 0-10 scale, with 10 being the worst.  The patient rates his anxiety at a 3/10 on a 0-10 scale, with 10 being the worst.  The patient rates hopelessness at a 0/10 on a 0-10 scale with 10 being the worst.  The patient would like to not adjust or change his medications at this visit.  The patient denies suicidal ideations and homicidal ideations, and is convincing.  The patient denies any auditory hallucinations or visual hallucinations.  The patient does not endorse significant symptoms consistent with bipolar disorder or a psychotic illness.              Prior Psychiatric Medications:  Tenex 1 mg nightly - caused excessive sedation and daytime drowsiness  Strattera 40 mg daily - effective for ADHD, but caused urinary and sexual side effects  Adderall XR 15 mg daily - only mildly effective for ADHD  Adderall XR 20 mg daily - significant increase in anxiety and irritability with no improvement in focus and concentration from the 15 mg   Concerta 36 and 54 mg daily - ineffective      The following portions of the patient's history were reviewed and updated as appropriate: allergies, current medications, past family history, past medical history, past  social history, past surgical history and problem list.    Review of Systems   Constitutional: Negative for activity change, appetite change, fatigue and unexpected weight change.   Psychiatric/Behavioral: Positive for decreased concentration, dysphoric mood and sleep disturbance (r/t sleep apnea). Negative for agitation, behavioral problems, confusion, hallucinations, self-injury and suicidal ideas. The patient is nervous/anxious. The patient is not hyperactive.        Objective   Physical Exam  Constitutional:       Appearance: Normal appearance.   Neurological:      Mental Status: He is alert.   Psychiatric:         Attention and Perception: Attention and perception normal.         Mood and Affect: Mood and affect normal.         Speech: Speech normal.         Behavior: Behavior normal. Behavior is cooperative.         Thought Content: Thought content normal. Thought content does not include homicidal or suicidal ideation. Thought content does not include homicidal or suicidal plan.         Cognition and Memory: Cognition and memory normal.         Judgment: Judgment normal.       There were no vitals taken for this visit.    The patient was seen remotely today via a MyChart Video Visit through [facility].  Unable to obtain vital signs due to nature of remote visit.  Height stated at 71 inches.  Weight stated at 231 pounds.     Allergies   Allergen Reactions   • Guanfacine Other (See Comments)     sedation   • Strattera [Atomoxetine] Other (See Comments)     Erectile dysfunction, difficulty urinating, urinary incontinence   • Sulfa Antibiotics GI Intolerance       No results found for this or any previous visit (from the past 2016 hour(s)).    Current Medications:   Current Outpatient Medications   Medication Sig Dispense Refill   • amLODIPine (NORVASC) 2.5 MG tablet Take 1 tablet by mouth Every Night. 90 tablet 0   • buPROPion XL (WELLBUTRIN XL) 300 MG 24 hr tablet Take 1 tablet by mouth Daily. 90 tablet 0   •  cetirizine (zyrTEC) 10 MG tablet Take 10 mg by mouth Daily.     • famotidine (PEPCID) 10 MG tablet Take 10 mg by mouth 2 (Two) Times a Day.     • lisdexamfetamine (VYVANSE) 60 MG capsule Take 1 capsule by mouth Every Morning 30 capsule 0   • losartan (COZAAR) 100 MG tablet Take 1 tablet by mouth Daily. 90 tablet 0   • meloxicam (MOBIC) 15 MG tablet Take 1 tablet by mouth Daily As Needed for Moderate Pain. 30 tablet 11     No current facility-administered medications for this visit.       Mental Status Exam:   Hygiene:   good  Cooperation:  Cooperative  Eye Contact:  Good  Psychomotor Behavior:  Appropriate  Affect:  Full range  Hopelessness: Denies  Speech:  Normal  Thought Process:  Goal directed and Linear  Thought Content:  Normal  Suicidal:  None  Homicidal:  None  Hallucinations:  None  Delusion:  None  Memory:  Intact  Orientation:  Person, Place, Time and Situation  Reliability:  good  Insight:  Good  Judgement:  Good  Impulse Control:  Good  Physical/Medical Issues:  Yes See medical history         PHQ-2 Depression Screening  Little interest or pleasure in doing things? 0-->not at all   Feeling down, depressed, or hopeless? 1-->several days   PHQ-2 Total Score 1         The ROSA report, request number [id number], of the past 12 months were reviewed and is appropriate.  The patient/guardian reports taking the medication only as prescribed.  The patient/guardian denies any abuse or misuse of the medication.  The patient/guardian denies any other substance use or issues.  There are no apparent substance related issues.  The patient reports no side effects of the current medication usage.  The patient/guardian has reported significant improvement with medication usage and wishes to continue medication as prescribed.  The patient/guardian is appropriate to continue with current medication usage at this time.  Reinforced risks and side effects of medication usage, patient and/or guardian verbalize understanding  in their own words and are in agreement with current plan.          Assessment & Plan   Diagnoses and all orders for this visit:    1. ADHD, adult residual type (Primary)  -     lisdexamfetamine (VYVANSE) 60 MG capsule; Take 1 capsule by mouth Every Morning  Dispense: 30 capsule; Refill: 0    2. Generalized anxiety disorder  -     buPROPion XL (WELLBUTRIN XL) 300 MG 24 hr tablet; Take 1 tablet by mouth Daily.  Dispense: 90 tablet; Refill: 0    3. Mild episode of recurrent major depressive disorder (HCC)  -     buPROPion XL (WELLBUTRIN XL) 300 MG 24 hr tablet; Take 1 tablet by mouth Daily.  Dispense: 90 tablet; Refill: 0            Visit Diagnoses:    ICD-10-CM ICD-9-CM   1. ADHD, adult residual type  F90.8 314.01   2. Generalized anxiety disorder  F41.1 300.02   3. Mild episode of recurrent major depressive disorder (HCC)  F33.0 296.31       GOALS:  Short Term Goals: Patient will be compliant with medication, and patient will have no significant medication related side effects.  Patient will be engaged in psychotherapy as indicated.  Patient will report subjective improvement of symptoms.  Long term goals: To stabilize mood and treat/improve subjective symptoms, the patient will stay out of the hospital, the patient will be at an optimal level of functioning, and the patient will take all medications as prescribed.  The patient verbalized understanding and agreement with goals that were mutually set.      SUICIDE RISK ASSESSMENT: Unalterable demographics and a history of mental health intervention indicate this patient is in a high risk category compared to the general population. At present, the patient denies active SI/HI, intentions, or plans at this time and agrees to seek immediate care should such thoughts develop. The patient verbalizes understanding of how to access emergency care if needed and agrees to do so. Consideration of suicide risk and protective factors such as history, current presentation,  individual strengths and weaknesses, psychosocial and environmental stressors and variables, psychiatric illness and symptoms, medical conditions and pain, took place in this interview. Based on those considerations, the patient is determined: within individual baseline and presenting no imminent risk for suicide or homicide. Other recommendations: The patient does not meet the criteria for inpatient admission and is not a safety risk to self or others at today's visit. Inpatient treatment offers no significant advantages over outpatient treatment for this patient at today's visit.      SAFETY PLAN:  Patient was given ample time for questions and fully participated in treatment planning.  Patient was encouraged to call the clinic with any questions or concerns.  Patient was informed of access to emergency care. If patient were to develop any significant symptomatology, suicidal ideation, homicidal ideation, any concerns, or feel unsafe at any time they are to call the clinic and if unable to get immediate assistance should immediately call 911 or go to the nearest emergency room.  The patient is advised to remove or secure (lock away) all lethal weapons (including guns) and sharps (including razors, scissors, knives, etc.).  All medications (including any prescribed and any over the counter medications) should be stored in a safe and secured location that is not obtainable by children/adolescents.  Patient was given an opportunity and encouraged to ask questions about their medication, illness, and treatment. Patient contracted verbally for the following: If you are experiencing an emotional crisis or have thoughts of harming yourself or others, please go to your nearest local emergency room or call 911. Will continue to re-assess medication response and side effects frequently to establish efficacy and ensure safety. Risks, any black box warnings, side effects, off label usage, and benefits of medication and  treatment discussed with patient, along with potential adverse side effects of current and/or newly prescribed medication, alternative treatment options, and OTC medications.  Patient verbalized understanding of potential risks, any off label use of medication, any black box warnings, and any side effects in their own words. The patient verbalized understanding and agreed to comply with the safety plan discussed in their own words.  Patient given the number to the office. Number also available to the 24- hour suicide hotline.      TREATMENT PLAN/GOALS: Continue medications and treatment plan as indicated. Treatment and medication options discussed during today's visit. Patient acknowledged and verbally consented to continue with current treatment plan and was educated on the importance of compliance with treatment and follow-up appointments.        -Continue Vyvanse 60 mg daily for ADHD  -Continue Wellbutrin  mg daily for depression/anxiety/ADHD  -Discussed with the patient that stimulants can only be prescribed up to a 30-day supply with no refills by this APRN.  Discussed with the patient that since next follow-up visit with this APRN is scheduled for approximately 2 months, they will have to request a refill for stimulant(s) prior to next scheduled follow-up appointment.  Instructed patient to request a refill approximately 4-5 days before running out of medication via the hoozin laxmi or by calling the clinic to ensure that refill can be sent in an appropriate time and they do not run out of medication.  The patient verbalizes understanding and endorses that they are agreeable to this.  -Discussed with the patient that the Biden Administration announced on January 30, 2023 that the national and public health emergencies (PHE) will end on May 11, 2023.  Discussed with the patient that during the PHE a portion of the Frank Judi Act was waived, allowing controlled substances to be provided via telemedicine  without in person encounters.  Discussed with the patient that with the [facility] ending the Frank Judi Act will go back into full effect, meaning that prescriptions for controlled substances can not be given via telemedicine without in person encounters and meeting all requirements of the Frank Judi Act.  Discussed with the patient that the [facility] is strictly a telemedicine clinic and cannot offer the patient an in-person evaluation to be compliant with the Frank Judi Act as it goes back into effect.  Discussed with the patient unless there are legislative changes prior to the ending of the PHE on May 11, 2023, the patient will no longer be able to obtain prescription(s) for controlled substance from this [facility]/the [facility] and they will need to establish care with a local provider in their area for an in-person evaluation and treatment with any controlled substances.  The patient verbalizes understanding in their own words.        MEDICATION ISSUES: Discussed medication options and treatment plan of prescribed medication, any off label use of medication, as well as the risks, benefits, any black box warnings including increased suicidality, and side effects including but not limited to potential falls, dizziness, possible impaired driving, GI side effects (change in appetite, abdominal discomfort, nausea, vomiting, diarrhea, and/or constipation), dry mouth, somnolence, sedation, insomnia, activation, agitation, irritation, tremors, abnormal muscle movements or disorders, headache, sweating, possible bruising or rare bleeding, electrolyte and/or fluid abnormalities, change in blood pressure/heart rate/and or heart rhythm, sexual dysfunction, and metabolic adversities among others. Patient and/or guardian agreeable to call the office with any worsening of symptoms or onset of side effects, or if any concerns or questions arise.   The contact information for the office is made available to the patient and/or guardian.  Patient and/or guardian agreeable to call 911 or go to the nearest ER should they begin having any SI/HI, or if any urgent concerns arise. No medication side effects or related complaints today.    The patient is being prescribed a controlled substance as part of the treatment plan. The patient/guardian has been educated of appropriate use of the medication(s), including risks and side effects such as insomnia, headache, exacerbation of tics, nervousness, irritability, overstimulation, tremor, dizziness, anorexia or change in appetite, nausea, dry mouth, constipation, diarrhea, weight loss, sexual dysfunction, psychotic episodes, seizures, palpitations, tachycardia, hypertension, rare activation (activation of hypomania, chelsie, and/or suicidal ideations), cardiovascular adverse effects including sudden death (especially patients with pre-existing structural abnormalities often associated with a family history of cardiac disease), may slow normal growth in children, weight gain is reported but not expected, sedation is possible but activation is much more common, metabolic adversities, and overdose among others. Patient/guardian is also informed that the medication is to be used by the patient only, the medication is to be used only as directed, and the medication should not be combined with other substances unless directed by a Provider/Prescriber. The patient/guardian verbalized understanding and agreement with this in their own words, and wish to continue with current treatment plan.    Without the prescribed medication for ADHD, it is reported that the patient has problems with attention and focus including being easily distracted, easily losing objects, trouble with time management, trouble completing tasks because of distractions, procrastination, indecisiveness, careless mistakes in daily activities/work/school, and not  finishing jobs that are started.  Patient denies any side effects, no worsening of insomnia, and no worsening of anxiety on the medication dose.  Due to the reported problems without the medication usage, as well as the significant improvement in symptoms with the medication usage, the patient requests to remain on the prescribed medication for ADHD.              VERBAL INFORMED CONSENT FOR MEDICATION:  The patient was educated that their proposed/prescribed psychotropic medication(s) has potential risks, side effects, adverse effects, and black box warnings; and these have been discussed with the patient.  The patient has been informed that their treatment and medication dosage is to be individualized, and may even be above or below the recommended range/dosage due to patient individualization and response, but medication is prescribed using a shared decision making approach, and no medication or dosage will be prescribed without the patient's verbal consent.  The reason for the use of the medication including any off label use and alternative modes of treatment other than or in addition to medication has been considered and discussed, the probable consequences of not receiving the proposed treatment have been discussed, and any treatment side effects, black box warnings, and cautions associated with treatment have been discussed with the patient.  The patient is allowed ample time to openly discuss and ask questions regarding the proposed medication(s) and treatment plan and the patient verbalizes understanding the reasons for the use of the medication, its potential risks and benefits, other alternative treatment(s), and the probable consequences that may occur if the proposed medication is not given.  The patient has been given ample time to ask questions and study the information and find the information to be specific, accurate, and complete.  The patient gives verbal consent for the medication(s)  proposed/prescribed, they verbalized understanding that they can refuse and withdraw consent at any time with the assistance of this APRN, and the patient has verbally confirmed that they are aware, and are willing, to take the prescribed medication and follow the treatment plan with the known possible risks, side effect, black box warnings, and any potential medication interactions, and the patient reports they will be worse off without this medication and treatment plan.  The patient is advised to contact this APRN/this office if any questions or concerns arise at any time (at [phone]), or call 911/go to the closest emergency department if needed or outside of office hours.        [facility] No Show Policy:  We understand unexpected circumstances arise; however, anytime you miss your appointment we are unable to provide you appropriate care.  In addition, each appointment missed could have been used to provide care for others.  We ask that you call at least 24 hours in advance to cancel or reschedule an appointment.  We would like to take this opportunity to remind you of our policy stating patients who miss THREE or more appointments without cancelling or rescheduling 24 hours in advance of the appointment may be subject to cancellation of any further visits with our clinic and recommendation to seek in-person services/visits.    Please call [phone] to reschedule your appointment. If there are reasons that make it difficult for you to keep the appointments, please call and let us know how we can help.  Please understand that medication prescribing will not continue without seeing your provider.      [facility]'s No Show Policy reviewed with patient at today's visit. Patient verbalized understanding of this policy. Discussed with patient that in the event that there are three or more no show visits, it will be recommended that they pursue in-person  services/visits as noncompliance with telehealth visits indicates that patient is not an appropriate candidate for telemedicine and would likely be more appropriate for in-person services/visits. Patient verbalizes understanding and is agreeable to this.          MEDS ORDERED DURING VISIT:  New Medications Ordered This Visit   Medications   • lisdexamfetamine (VYVANSE) 60 MG capsule     Sig: Take 1 capsule by mouth Every Morning     Dispense:  30 capsule     Refill:  0   • buPROPion XL (WELLBUTRIN XL) 300 MG 24 hr tablet     Sig: Take 1 tablet by mouth Daily.     Dispense:  90 tablet     Refill:  0       Return in about 2 months (around 5/2/2023), or if symptoms worsen or fail to improve, for Recheck.       Patient will follow-up in 2 months, highly encouraged the patient if he had any questions or concerns to contact the behavioral health [facility] clinic for sooner appointment patient verbalized understanding.      Functional Status: Mild impairment     Prognosis: Good with Ongoing Treatment             This document has been electronically signed by BETTY Sandy  March 2, 2023 16:13 EST       Some of the data in this electronic note has been brought forward from a previous encounter, any necessary changes have been made, it has been reviewed by this APRN, and it is accurate.      Part of this note may be an electronic transcription/translation of spoken language to printed text using the Dragon Dictation System.

## 2023-03-04 PROCEDURE — 95806 SLEEP STUDY UNATT&RESP EFFT: CPT | Performed by: INTERNAL MEDICINE

## 2023-03-10 ENCOUNTER — OFFICE VISIT (OUTPATIENT)
Dept: NEUROSURGERY | Facility: CLINIC | Age: 38
End: 2023-03-10
Payer: COMMERCIAL

## 2023-03-10 VITALS
DIASTOLIC BLOOD PRESSURE: 84 MMHG | WEIGHT: 235 LBS | HEIGHT: 70 IN | SYSTOLIC BLOOD PRESSURE: 118 MMHG | BODY MASS INDEX: 33.64 KG/M2

## 2023-03-10 DIAGNOSIS — R29.898 RIGHT ARM WEAKNESS: ICD-10-CM

## 2023-03-10 DIAGNOSIS — R20.2 NUMBNESS AND TINGLING IN RIGHT HAND: ICD-10-CM

## 2023-03-10 DIAGNOSIS — R20.0 NUMBNESS AND TINGLING IN RIGHT HAND: ICD-10-CM

## 2023-03-10 DIAGNOSIS — M54.2 CERVICALGIA: Primary | ICD-10-CM

## 2023-03-10 PROCEDURE — 3074F SYST BP LT 130 MM HG: CPT

## 2023-03-10 PROCEDURE — 99204 OFFICE O/P NEW MOD 45 MIN: CPT

## 2023-03-10 PROCEDURE — 1159F MED LIST DOCD IN RCRD: CPT

## 2023-03-10 PROCEDURE — 1160F RVW MEDS BY RX/DR IN RCRD: CPT

## 2023-03-10 PROCEDURE — 3079F DIAST BP 80-89 MM HG: CPT

## 2023-03-10 NOTE — PROGRESS NOTES
Subjective   Patient: Frank Champion   Age, Date of Birth: 37 y.o., 1985  Sex: male    Primary Care Provider: Magaly Taylor MD    Chief Complaint: Neck pain with tingling in right hand    History of Present Illness:  Patient is a 37-year-old health  who beginning in July 2022 began to experience pain on his right side that slowly progressed down his arm posteriorly and then into his index and middle fingers on the right side.  His primary care provider gave him a referral to physical therapy and started him on gabapentin both of which appear to help him.  After a couple sessions of physical therapy he sought relief and stopped going.  His pain came back in December 2022, with it starting in his neck and radiating down his right arm.  It was at this time he was started on meloxicam which has totally taken away his pain down his right arm.  This pain rating down his right arm has been gone for the last 1.5 to 2 weeks.  He still is having some localized neck pain as well as tingling in his right index and middle fingers.  He also reports that he is experiencing some right tricep weakness since this episode started.  Patient currently is not taking gabapentin, but he has an appointment with his primary care provider next week to discuss that.  He has not been to pain management at this time.  Although he has been to see a chiropractor which appears to be helping his neck pain a small amount.  Historically Medrol Dosepak's have not helped him.  Patient denies urinary or bowel incontinence, saddle anesthesia, gait instability.      Current Outpatient Medications   Medication Sig Dispense Refill   • amLODIPine (NORVASC) 2.5 MG tablet Take 1 tablet by mouth Every Night. 90 tablet 0   • buPROPion XL (WELLBUTRIN XL) 300 MG 24 hr tablet Take 1 tablet by mouth Daily. 90 tablet 0   • cetirizine (zyrTEC) 10 MG tablet Take 1 tablet by mouth Daily.     • famotidine (PEPCID) 10 MG tablet Take 1 tablet by mouth 2  "(Two) Times a Day.     • lisdexamfetamine (VYVANSE) 60 MG capsule Take 1 capsule by mouth Every Morning 30 capsule 0   • losartan (COZAAR) 100 MG tablet Take 1 tablet by mouth Daily. 90 tablet 0   • meloxicam (MOBIC) 15 MG tablet Take 1 tablet by mouth Daily As Needed for Moderate Pain. 30 tablet 11     No current facility-administered medications for this visit.       Past Medical History:   Diagnosis Date   • ADHD (attention deficit hyperactivity disorder)    • Anxiety    • Arthritis    • Depression    • HTN (hypertension)    • Hypertriglyceridemia 02/22/2020   • Wears glasses        Review of Systems   Respiratory: Positive for apnea.    Musculoskeletal: Positive for neck pain and neck stiffness.   Allergic/Immunologic: Positive for environmental allergies.   Neurological: Positive for tremors.   Psychiatric/Behavioral: The patient is hyperactive.        Objective   Vitals:    03/10/23 1101   BP: 118/84   BP Location: Left arm   Patient Position: Sitting   Cuff Size: Adult   Weight: 107 kg (235 lb)   Height: 177.8 cm (70\")        Physical Exam:    Physical Exam  Vitals and nursing note reviewed.   Constitutional:       General: He is not in acute distress.     Appearance: Normal appearance. He is not ill-appearing, toxic-appearing or diaphoretic.   HENT:      Head: Normocephalic and atraumatic.   Cardiovascular:      Comments: +3 radial pulses bilaterally.  Musculoskeletal:        Arms:       Comments: Former pain distribution noted above in red.  Current pain distribution noted above in black    Patient had noticeably weakend right-sided elbow extension, but otherwise had good strength with elbow flexion, shoulder abduction, intrinsic hand muscles.   Neurological:      Mental Status: He is alert.      Cranial Nerves: No cranial nerve deficit or facial asymmetry.      Sensory: Sensation is intact. No sensory deficit.      Motor: Motor function is intact. No weakness, tremor, atrophy or abnormal muscle tone.      " Coordination: Romberg sign negative.      Gait: Gait is intact.      Deep Tendon Reflexes:      Reflex Scores:       Tricep reflexes are 2+ on the right side and 2+ on the left side.       Bicep reflexes are 2+ on the right side and 2+ on the left side.       Brachioradialis reflexes are 2+ on the right side and 2+ on the left side.     Comments: Ezra negative bilaterally.  Intact vibratory and temperature sensation bilaterally.   Psychiatric:         Mood and Affect: Mood normal.         Behavior: Behavior normal.         Thought Content: Thought content normal.         Judgment: Judgment normal.         Tobacco Use: Medium Risk   • Smoking Tobacco Use: Former   • Smokeless Tobacco Use: Never   • Passive Exposure: Not on file     BMI is >= 30 and <35. (Class 1 Obesity). The following options were offered after discussion;: exercise counseling/recommendations      STEADI Fall Risk Assessment has not been completed.    Assessment & Plan     Data Review:  (All imaging is independently reviewed unless stated otherwise.)  And as such give my full endorsement for MRI of cervical spine from 2/18/2023 shows a disc herniation at C6-C7 that likely contacts the exiting right-sided nerve root.    Tobacco Use: Medium Risk   • Smoking Tobacco Use: Former   • Smokeless Tobacco Use: Never   • Passive Exposure: Not on file       Body mass index is 33.72 kg/m².    Anticoagulation review (and indication): None      Medical Decision Making:  Patient is likely suffering from a C7 radiculopathy, and certainly could be a surgical candidate in the future.  Dr. Reddy Stuart was considering either an arthroplasty or an ACDF, this was discussed with the patient, however he is currently not experiencing radicular symptoms and appears to be doing quite well with conservative management.  I have given him a new referral to physical therapy and strongly encouraged him to do the exercises at home.  Patient also might benefit from a steroid  injection at C6-C7 on the right side, but I do not think that is indicated at this time.  He also might find some benefit of getting back on gabapentin, this was previously written by his primary care provider and he has an appointment with her next week.  I will defer that refill to her for the time being, but we of course would be happy to fill it if she does not feel comfortable doing that.  He will follow-up with Dr. Reddy Stuart in 6 weeks to see how his symptoms are doing.  I strongly encouraged him to contact us if he has a recurrence of his right arm symptoms.  Patient encouraged to contact us if he has any changes in his condition or any concerns.     Diagnosis Plan   1. Cervicalgia  Ambulatory Referral to Physical Therapy      2. Numbness and tingling in right hand  Ambulatory Referral to Physical Therapy      3. Right arm weakness  Ambulatory Referral to Physical Therapy           Electronically signed and dated:    Checo Nassar PA-C on 13:22 EST 03/10/23

## 2023-03-10 NOTE — PROGRESS NOTES
Subjective     Chief Complaint: ***    Patient ID: Frank Champion is a 37 y.o. male {Specialty - why patient here?:[id number]}    History of Present Illness    ***    The following portions of the patient's history were reviewed and updated as appropriate: allergies, current medications, past family history, past medical history, past social history, past surgical history and problem list.    Family history:   Family History   Problem Relation Age of Onset   • Mental illness Mother    • Depression Mother    • Colon polyps Mother    • Arthritis Father    • Depression Father    • Hyperlipidemia Paternal Grandfather    • Hypertension Paternal Grandfather    • Heart attack Paternal Grandfather    • Stroke Paternal Grandfather    • Alcohol abuse Paternal Grandfather    • Alcohol abuse Paternal Grandmother    • Colon cancer Neg Hx    • Prostate cancer Neg Hx        Social history:   Social History     Socioeconomic History   • Marital status:      Spouse name: Julienne Patel   Tobacco Use   • Smoking status: Former     Packs/day: 0.25     Years: 12.00     Pack years: 3.00     Types: Cigarettes, Pipe, Cigars, Electronic Cigarette     Quit date: 2022     Years since quittin.2   • Smokeless tobacco: Never   • Tobacco comments:     never been a regular smoker, very little tobacco use. reports maybe 2 cigarettes per month.   Vaping Use   • Vaping Use: Every day   • Substances: Nicotine   Substance and Sexual Activity   • Alcohol use: Yes     Alcohol/week: 3.0 - 4.0 standard drinks     Types: 3 - 4 Glasses of wine per week   • Drug use: Not Currently     Types: Marijuana   • Sexual activity: Yes     Partners: Female, Male     Birth control/protection: Condom, Surgical, None     Comment: Vasectomy, became ethically non-monogomous.       Review of Systems   Musculoskeletal: Positive for neck pain.       Objective   There were no vitals taken for this visit.  There is no height or weight on file to calculate  BMI.    Physical Exam    Assessment & Plan     Independent Review of Radiographic Studies:      ***    Medical Decision Making:      ***    There are no diagnoses linked to this encounter.    No follow-ups on file.           This document signed by ENRIQUE Stuart MD March 10, 2023 10:49 EST

## 2023-04-03 DIAGNOSIS — F90.8 ADHD, ADULT RESIDUAL TYPE: Chronic | ICD-10-CM

## 2023-04-14 PROCEDURE — 87070 CULTURE OTHR SPECIMN AEROBIC: CPT | Performed by: FAMILY MEDICINE

## 2023-04-14 PROCEDURE — 87205 SMEAR GRAM STAIN: CPT | Performed by: FAMILY MEDICINE

## 2023-04-14 PROCEDURE — 87147 CULTURE TYPE IMMUNOLOGIC: CPT | Performed by: FAMILY MEDICINE

## 2023-05-09 DIAGNOSIS — F90.8 ADHD, ADULT RESIDUAL TYPE: Chronic | ICD-10-CM

## 2023-05-31 ENCOUNTER — TELEMEDICINE (OUTPATIENT)
Dept: PSYCHIATRY | Facility: CLINIC | Age: 38
End: 2023-05-31

## 2023-05-31 DIAGNOSIS — F41.1 GENERALIZED ANXIETY DISORDER: Chronic | ICD-10-CM

## 2023-05-31 DIAGNOSIS — F90.8 ADHD, ADULT RESIDUAL TYPE: Primary | Chronic | ICD-10-CM

## 2023-05-31 DIAGNOSIS — F33.0 MILD EPISODE OF RECURRENT MAJOR DEPRESSIVE DISORDER: Chronic | ICD-10-CM

## 2023-05-31 PROCEDURE — 99214 OFFICE O/P EST MOD 30 MIN: CPT | Performed by: NURSE PRACTITIONER

## 2023-05-31 PROCEDURE — 1159F MED LIST DOCD IN RCRD: CPT | Performed by: NURSE PRACTITIONER

## 2023-05-31 PROCEDURE — 1160F RVW MEDS BY RX/DR IN RCRD: CPT | Performed by: NURSE PRACTITIONER

## 2023-05-31 RX ORDER — BUPROPION HYDROCHLORIDE 300 MG/1
300 TABLET ORAL DAILY
Qty: 90 TABLET | Refills: 0 | Status: SHIPPED | OUTPATIENT
Start: 2023-05-31

## 2023-05-31 RX ORDER — SERTRALINE HYDROCHLORIDE 25 MG/1
25 TABLET, FILM COATED ORAL DAILY
Qty: 30 TABLET | Refills: 0 | Status: SHIPPED | OUTPATIENT
Start: 2023-05-31

## 2023-07-24 DIAGNOSIS — F41.1 GENERALIZED ANXIETY DISORDER: Chronic | ICD-10-CM

## 2023-07-24 DIAGNOSIS — F33.0 MILD EPISODE OF RECURRENT MAJOR DEPRESSIVE DISORDER: Chronic | ICD-10-CM

## 2023-07-24 RX ORDER — VILAZODONE HYDROCHLORIDE 10 MG/1
10 TABLET ORAL DAILY
Qty: 30 TABLET | Refills: 0 | Status: SHIPPED | OUTPATIENT
Start: 2023-07-24

## 2023-08-02 ENCOUNTER — TELEMEDICINE (OUTPATIENT)
Dept: PSYCHIATRY | Facility: CLINIC | Age: 38
End: 2023-08-02
Payer: COMMERCIAL

## 2023-08-02 DIAGNOSIS — F90.8 ADHD, ADULT RESIDUAL TYPE: Chronic | ICD-10-CM

## 2023-08-02 DIAGNOSIS — F41.1 GENERALIZED ANXIETY DISORDER: Primary | Chronic | ICD-10-CM

## 2023-08-02 DIAGNOSIS — F33.0 MILD EPISODE OF RECURRENT MAJOR DEPRESSIVE DISORDER: Chronic | ICD-10-CM

## 2023-08-02 PROCEDURE — 1159F MED LIST DOCD IN RCRD: CPT | Performed by: NURSE PRACTITIONER

## 2023-08-02 PROCEDURE — 1160F RVW MEDS BY RX/DR IN RCRD: CPT | Performed by: NURSE PRACTITIONER

## 2023-08-02 PROCEDURE — 99214 OFFICE O/P EST MOD 30 MIN: CPT | Performed by: NURSE PRACTITIONER

## 2023-08-02 RX ORDER — ESCITALOPRAM OXALATE 5 MG/1
5 TABLET ORAL DAILY
Qty: 30 TABLET | Refills: 0 | Status: SHIPPED | OUTPATIENT
Start: 2023-08-02

## 2023-08-03 ENCOUNTER — LAB (OUTPATIENT)
Dept: LAB | Facility: HOSPITAL | Age: 38
End: 2023-08-03
Payer: COMMERCIAL

## 2023-08-03 DIAGNOSIS — Z79.899 LONG-TERM USE OF HIGH-RISK MEDICATION: ICD-10-CM

## 2023-08-03 LAB
AMPHET+METHAMPHET UR QL: POSITIVE
AMPHETAMINES UR QL: NEGATIVE
BARBITURATES UR QL SCN: NEGATIVE
BENZODIAZ UR QL SCN: NEGATIVE
BUPRENORPHINE SERPL-MCNC: NEGATIVE NG/ML
CANNABINOIDS SERPL QL: POSITIVE
COCAINE UR QL: NEGATIVE
FENTANYL UR-MCNC: NEGATIVE NG/ML
METHADONE UR QL SCN: NEGATIVE
OPIATES UR QL: NEGATIVE
OXYCODONE UR QL SCN: NEGATIVE
PCP UR QL SCN: NEGATIVE
PROPOXYPH UR QL: NEGATIVE
TRICYCLICS UR QL SCN: NEGATIVE

## 2023-08-03 PROCEDURE — 80307 DRUG TEST PRSMV CHEM ANLYZR: CPT

## 2023-08-09 ENCOUNTER — TELEPHONE (OUTPATIENT)
Dept: PSYCHIATRY | Facility: CLINIC | Age: 38
End: 2023-08-09
Payer: COMMERCIAL

## 2023-08-09 NOTE — TELEPHONE ENCOUNTER
Patient called - his labs came back and he would like to discuss them with you, he said that there are extenuating circumstances behind the test. Would like to speak to you as soon as you get the chance, today if possible?  #[phone].      Please Advise?        Thank You

## 2023-08-30 ENCOUNTER — TELEMEDICINE (OUTPATIENT)
Dept: PSYCHIATRY | Facility: CLINIC | Age: 38
End: 2023-08-30
Payer: COMMERCIAL

## 2023-08-30 DIAGNOSIS — F33.0 MILD EPISODE OF RECURRENT MAJOR DEPRESSIVE DISORDER: Chronic | ICD-10-CM

## 2023-08-30 DIAGNOSIS — F41.1 GENERALIZED ANXIETY DISORDER: Primary | Chronic | ICD-10-CM

## 2023-08-30 DIAGNOSIS — F90.8 ADHD, ADULT RESIDUAL TYPE: ICD-10-CM

## 2023-08-30 PROCEDURE — 99214 OFFICE O/P EST MOD 30 MIN: CPT | Performed by: NURSE PRACTITIONER

## 2023-08-30 RX ORDER — BUPROPION HYDROCHLORIDE 200 MG/1
200 TABLET, EXTENDED RELEASE ORAL 2 TIMES DAILY
Qty: 60 TABLET | Refills: 0 | Status: SHIPPED | OUTPATIENT
Start: 2023-08-30

## 2023-08-30 RX ORDER — DESVENLAFAXINE 25 MG/1
25 TABLET, EXTENDED RELEASE ORAL DAILY
Qty: 30 TABLET | Refills: 0 | Status: SHIPPED | OUTPATIENT
Start: 2023-08-30

## 2023-08-30 NOTE — PROGRESS NOTES
This provider is located at the [facility] (through [facility]), [address], [facility], [address] using a secure [employer] Video Visit through [employer]. Patient is being seen remotely via telehealth at their home address in [place], and stated they are in a secure environment for this session. The patient's condition being diagnosed/treated is appropriate for telemedicine. The provider identified herself as well as her credentials. The patient, and/or patients guardian, consent to be seen remotely, and when consent is given they understand that the consent allows for patient identifiable information to be sent to a third party as needed. They may refuse to be seen remotely at any time. The electronic data is encrypted and password protected, and the patient and/or guardian has been advised of the potential risks to privacy not withstanding such measures.    You have chosen to receive care through a telehealth visit.  Do you consent to use a video/audio connection for your medical care today? Yes     Patient identifiers utilized: Name and date of birth.    Patient verbally confirmed consent for today's encounter  08/30/2023 .        Subjective   Frank Champion is a 37 y.o. male who is here today for medication management follow up.    Chief Complaint: ADHD, depression, anxiety    History of Present Illness:  The patient reports that he hasn't been doing that well over the past few weeks.  He states that his ADHD symptoms have been significantly exacerbated as he has not been taking his Vyvanse in the past few weeks due to failed UDS.  The patient states that he opted not to being the Lexapro after last visit due to reading about potential side effects with this medication.  He states that he would like to avoid any SSRIs due to history of side effects with this class of medications, so states that he would be interested in discussing potential alternative  medication/treatment options for his anxiety/depression at this time.  The patient reports his appetite as good.  The patient reports his sleep as good.  The patient denies any nightmares or bad dreams.  The patient denies any new medical problems or changes in medications since last appointment with this facility.  The patient denies any abnormal muscle movements or tics.  The patient rates his ADHD at a 7-8/10 on a 0-10 scale, with 10 being the worst.  The patient rates his depression at a 3/10 on a 0-10 scale, with 10 being the worst.  The patient rates his anxiety at a 5/10 on a 0-10 scale, with 10 being the worst.  The patient rates hopelessness at a 0/10 on a 0-10 scale with 10 being the worst.  The patient denies suicidal ideations and homicidal ideations, and is convincing.  The patient denies any auditory hallucinations or visual hallucinations.  The patient does not endorse significant symptoms consistent with bipolar disorder or a psychotic illness.          Prior Psychiatric Medications:  Tenex 1 mg nightly - caused excessive sedation and daytime drowsiness  Strattera 40 mg daily - effective for ADHD, but caused urinary and sexual side effects  Adderall XR 15 mg daily - only mildly effective for ADHD  Adderall XR 20 mg daily - significant increase in anxiety and irritability with no improvement in focus and concentration from the 15 mg   Concerta 36 and 54 mg daily - ineffective  Zoloft 25 mg daily - partial efficacy for anxiety, but caused sexual side effects  Viibryd 10 mg daily - reports increased depression and anxiety       The following portions of the patient's history were reviewed and updated as appropriate: allergies, current medications, past family history, past medical history, past social history, past surgical history and problem list.    Review of Systems   Constitutional:  Negative for activity change, appetite change, fatigue and unexpected weight change.   Psychiatric/Behavioral:   Positive for decreased concentration and dysphoric mood. Negative for agitation, behavioral problems, confusion, hallucinations, self-injury, sleep disturbance and suicidal ideas. The patient is nervous/anxious. The patient is not hyperactive.      Objective   Physical Exam  Constitutional:       Appearance: Normal appearance.   Neurological:      Mental Status: He is alert.   Psychiatric:         Attention and Perception: Perception normal. He is inattentive.         Mood and Affect: Affect normal. Mood is anxious and depressed.         Speech: Speech normal.         Behavior: Behavior normal. Behavior is cooperative.         Thought Content: Thought content normal. Thought content does not include homicidal or suicidal ideation. Thought content does not include homicidal or suicidal plan.         Cognition and Memory: Cognition and memory normal.         Judgment: Judgment normal.     There were no vitals taken for this visit.    The patient was seen remotely today via a [email or web address] Video Visit through [employer].  Unable to obtain vital signs due to nature of remote visit.  Height stated at 70 inches.  Weight stated at 235 pounds.     Allergies   Allergen Reactions    Guanfacine Other (See Comments)     sedation    Strattera [Atomoxetine] Other (See Comments)     Erectile dysfunction, difficulty urinating, urinary incontinence    Sulfa Antibiotics GI Intolerance       Recent Results (from the past 2016 hour(s))   Urine Drug Screen - Urine, Clean Catch    Collection Time: 08/03/23 10:24 AM    Specimen: Urine, Clean Catch   Result Value Ref Range    THC, Screen, Urine Positive (A) Negative    Phencyclidine (PCP), Urine Negative Negative    Cocaine Screen, Urine Negative Negative    Methamphetamine, Ur Negative Negative    Opiate Screen Negative Negative    Amphetamine Screen, Urine Positive (A) Negative    Benzodiazepine Screen, Urine Negative Negative    Tricyclic Antidepressants Screen Negative Negative    Methadone  Screen, Urine Negative Negative    Barbiturates Screen, Urine Negative Negative    Oxycodone Screen, Urine Negative Negative    Propoxyphene Screen Negative Negative    Buprenorphine, Screen, Urine Negative Negative   Fentanyl, Urine - Urine, Clean Catch    Collection Time: 08/03/23 10:24 AM    Specimen: Urine, Clean Catch   Result Value Ref Range    Fentanyl, Urine Negative Negative         Current Medications:   Current Outpatient Medications   Medication Sig Dispense Refill    amLODIPine (NORVASC) 2.5 MG tablet Take 1 tablet by mouth Every Night. 90 tablet 0    cetirizine (zyrTEC) 10 MG tablet Take 1 tablet by mouth Daily.      famotidine (PEPCID) 10 MG tablet Take 1 tablet by mouth 2 (Two) Times a Day.      losartan (COZAAR) 100 MG tablet Take 1 tablet by mouth Daily. 90 tablet 0    meloxicam (MOBIC) 15 MG tablet Take 1 tablet by mouth Daily As Needed for Moderate Pain. 30 tablet 11    buPROPion SR (WELLBUTRIN SR) 200 MG 12 hr tablet Take 1 tablet by mouth 2 (Two) Times a Day. 60 tablet 0    Desvenlafaxine Succinate ER 25 MG tablet sustained-release 24 hour Take 1 tablet by mouth Daily. 30 tablet 0     No current facility-administered medications for this visit.       Mental Status Exam:   Hygiene:   good  Cooperation:  Cooperative  Eye Contact:  Good  Psychomotor Behavior:  Appropriate  Affect:  Full range  Hopelessness: Denies  Speech:  Normal  Thought Process:  Goal directed and Linear  Thought Content:  Normal  Suicidal:  None  Homicidal:  None  Hallucinations:  None  Delusion:  None  Memory:  Intact  Orientation:  Person, Place, Time and Situation  Reliability:  good  Insight:  Good  Judgement:  Good  Impulse Control:  Good  Physical/Medical Issues:  Yes See medical history            Assessment & Plan   Diagnoses and all orders for this visit:    1. Generalized anxiety disorder (Primary)  -     Desvenlafaxine Succinate ER 25 MG tablet sustained-release 24 hour; Take 1 tablet by mouth Daily.  Dispense: 30  tablet; Refill: 0  -     buPROPion SR (WELLBUTRIN SR) 200 MG 12 hr tablet; Take 1 tablet by mouth 2 (Two) Times a Day.  Dispense: 60 tablet; Refill: 0    2. Mild episode of recurrent major depressive disorder  -     Desvenlafaxine Succinate ER 25 MG tablet sustained-release 24 hour; Take 1 tablet by mouth Daily.  Dispense: 30 tablet; Refill: 0  -     buPROPion SR (WELLBUTRIN SR) 200 MG 12 hr tablet; Take 1 tablet by mouth 2 (Two) Times a Day.  Dispense: 60 tablet; Refill: 0    3. ADHD, adult residual type  -     buPROPion SR (WELLBUTRIN SR) 200 MG 12 hr tablet; Take 1 tablet by mouth 2 (Two) Times a Day.  Dispense: 60 tablet; Refill: 0            Visit Diagnoses:    ICD-10-CM ICD-9-CM   1. Generalized anxiety disorder  F41.1 300.02   2. Mild episode of recurrent major depressive disorder  F33.0 296.31   3. ADHD, adult residual type  F90.8 314.01       GOALS:  Short Term Goals: Patient will be compliant with medication, and patient will have no significant medication related side effects.  Patient will be engaged in psychotherapy as indicated.  Patient will report subjective improvement of symptoms.  Long term goals: To stabilize mood and treat/improve subjective symptoms, the patient will stay out of the hospital, the patient will be at an optimal level of functioning, and the patient will take all medications as prescribed.  The patient verbalized understanding and agreement with goals that were mutually set.      SUICIDE RISK ASSESSMENT: Unalterable demographics and a history of mental health intervention indicate this patient is in a high risk category compared to the general population. At present, the patient denies active SI/HI, intentions, or plans at this time and agrees to seek immediate care should such thoughts develop. The patient verbalizes understanding of how to access emergency care if needed and agrees to do so. Consideration of suicide risk and protective factors such as history, current  presentation, individual strengths and weaknesses, psychosocial and environmental stressors and variables, psychiatric illness and symptoms, medical conditions and pain, took place in this interview. Based on those considerations, the patient is determined: within individual baseline and presenting no imminent risk for suicide or homicide. Other recommendations: The patient does not meet the criteria for inpatient admission and is not a safety risk to self or others at today's visit. Inpatient treatment offers no significant advantages over outpatient treatment for this patient at today's visit.      SAFETY PLAN:  Patient was given ample time for questions and fully participated in treatment planning.  Patient was encouraged to call the clinic with any questions or concerns.  Patient was informed of access to emergency care. If patient were to develop any significant symptomatology, suicidal ideation, homicidal ideation, any concerns, or feel unsafe at any time they are to call the clinic and if unable to get immediate assistance should immediately call 911 or go to the nearest emergency room.  The patient is advised to remove or secure (lock away) all lethal weapons (including guns) and sharps (including razors, scissors, knives, etc.).  All medications (including any prescribed and any over the counter medications) should be stored in a safe and secured location that is not obtainable by children/adolescents.  Patient was given an opportunity and encouraged to ask questions about their medication, illness, and treatment. Patient contracted verbally for the following: If you are experiencing an emotional crisis or have thoughts of harming yourself or others, please go to your nearest local emergency room or call 911. Will continue to re-assess medication response and side effects frequently to establish efficacy and ensure safety. Risks, any black box warnings, side effects, off label usage, and benefits of  medication and treatment discussed with patient, along with potential adverse side effects of current and/or newly prescribed medication, alternative treatment options, and OTC medications.  Patient verbalized understanding of potential risks, any off label use of medication, any black box warnings, and any side effects in their own words. The patient verbalized understanding and agreed to comply with the safety plan discussed in their own words.  Patient given the number to the office. Number also available to the 24- hour suicide hotline.      TREATMENT PLAN/GOALS: Continue medications and treatment plan as indicated. Treatment and medication options discussed during today's visit. Patient acknowledged and verbally consented to continue with current treatment plan and was educated on the importance of compliance with treatment and follow-up appointments.        -Discontinue Vyvanse 60 mg daily for ADHD due to failure of UDS.  Discussed with the patient that we will utilize non-stimulant medication options to address ADHD due to failure of UDS.  The patient verbalizes understanding in his own words and endorses that  he is agreeable to this.   -Discontinue Wellbutrin  mg daily as adjunct for depression/anxiety/ADHD  -Begin Wellbutrin  mg twice daily for ADHD and as adjunct for depression/anxiety.  Discussed with the patient that switching from XL to SR formulation may be more effective in managing his ADHD symptoms.  The patient verbalizes understanding in his own words and endorses that he is agreeable to this.   -Begin Pristiq 25 mg daily for depression/anxiety.  Discussed with the patient that we will trail Pristiq at this time per his preference to avoid any SSRIs due to history of side effects with SSRIs.  The patient verbalizes understanding in his own words and endorses that  he is agreeable to this.         MEDICATION ISSUES: Discussed medication options and treatment plan of prescribed  medication, any off label use of medication, as well as the risks, benefits, any black box warnings including increased suicidality, and side effects including but not limited to potential falls, dizziness, possible impaired driving, GI side effects (change in appetite, abdominal discomfort, nausea, vomiting, diarrhea, and/or constipation), dry mouth, somnolence, sedation, insomnia, activation, agitation, irritation, tremors, abnormal muscle movements or disorders, headache, sweating, possible bruising or rare bleeding, electrolyte and/or fluid abnormalities, change in blood pressure/heart rate/and or heart rhythm, sexual dysfunction, and metabolic adversities among others. Patient and/or guardian agreeable to call the office with any worsening of symptoms or onset of side effects, or if any concerns or questions arise.  The contact information for the office is made available to the patient and/or guardian.  Patient and/or guardian agreeable to call 911 or go to the nearest ER should they begin having any SI/HI, or if any urgent concerns arise. No medication side effects or related complaints today.              VERBAL INFORMED CONSENT FOR MEDICATION:  The patient was educated that their proposed/prescribed psychotropic medication(s) has potential risks, side effects, adverse effects, and black box warnings; and these have been discussed with the patient.  The patient has been informed that their treatment and medication dosage is to be individualized, and may even be above or below the recommended range/dosage due to patient individualization and response, but medication is prescribed using a shared decision making approach, and no medication or dosage will be prescribed without the patient's verbal consent.  The reason for the use of the medication including any off label use and alternative modes of treatment other than or in addition to medication has been considered and discussed, the probable consequences of  not receiving the proposed treatment have been discussed, and any treatment side effects, black box warnings, and cautions associated with treatment have been discussed with the patient.  The patient is allowed ample time to openly discuss and ask questions regarding the proposed medication(s) and treatment plan and the patient verbalizes understanding the reasons for the use of the medication, its potential risks and benefits, other alternative treatment(s), and the probable consequences that may occur if the proposed medication is not given.  The patient has been given ample time to ask questions and study the information and find the information to be specific, accurate, and complete.  The patient gives verbal consent for the medication(s) proposed/prescribed, they verbalized understanding that they can refuse and withdraw consent at any time with the assistance of this APRN, and the patient has verbally confirmed that they are aware, and are willing, to take the prescribed medication and follow the treatment plan with the known possible risks, side effect, black box warnings, and any potential medication interactions, and the patient reports they will be worse off without this medication and treatment plan.  The patient is advised to contact this APRN/this office if any questions or concerns arise at any time (at [phone]), or call 911/go to the closest emergency department if needed or outside of office hours.        [facility] No Show Policy:  We understand unexpected circumstances arise; however, anytime you miss your appointment we are unable to provide you appropriate care.  In addition, each appointment missed could have been used to provide care for others.  We ask that you call at least 24 hours in advance to cancel or reschedule an appointment.  We would like to take this opportunity to remind you of our policy stating patients who miss THREE or more appointments without  cancelling or rescheduling 24 hours in advance of the appointment may be subject to cancellation of any further visits with our clinic and recommendation to seek in-person services/visits.    Please call [phone] to reschedule your appointment. If there are reasons that make it difficult for you to keep the appointments, please call and let us know how we can help.  Please understand that medication prescribing will not continue without seeing your provider.      [facility]'s No Show Policy reviewed with patient at today's visit. Patient verbalized understanding of this policy. Discussed with patient that in the event that there are three or more no show visits, it will be recommended that they pursue in-person services/visits as noncompliance with telehealth visits indicates that patient is not an appropriate candidate for telemedicine and would likely be more appropriate for in-person services/visits. Patient verbalizes understanding and is agreeable to this.          MEDS ORDERED DURING VISIT:  New Medications Ordered This Visit   Medications    Desvenlafaxine Succinate ER 25 MG tablet sustained-release 24 hour     Sig: Take 1 tablet by mouth Daily.     Dispense:  30 tablet     Refill:  0    buPROPion SR (WELLBUTRIN SR) 200 MG 12 hr tablet     Sig: Take 1 tablet by mouth 2 (Two) Times a Day.     Dispense:  60 tablet     Refill:  0       Return in about 4 weeks (around 9/27/2023), or if symptoms worsen or fail to improve, for Recheck.       Patient will follow-up in 4 weeks, highly encouraged the patient if he had any questions or concerns to contact the behavioral health virtual clinic for sooner appointment patient verbalized understanding.      Functional Status: Moderate impairment     Prognosis: Guarded with Ongoing Treatment            This document has been electronically signed by BETTY Sandy  August 30, 2023 09:27 EDT       Some of the data in this electronic note has  been brought forward from a previous encounter, any necessary changes have been made, it has been reviewed by this APRN, and it is accurate.      Part of this note may be an electronic transcription/translation of spoken language to printed text using the Dragon Dictation System.

## 2023-09-25 DIAGNOSIS — F41.1 GENERALIZED ANXIETY DISORDER: Chronic | ICD-10-CM

## 2023-09-25 DIAGNOSIS — F33.0 MILD EPISODE OF RECURRENT MAJOR DEPRESSIVE DISORDER: Chronic | ICD-10-CM

## 2023-09-25 DIAGNOSIS — F90.8 ADHD, ADULT RESIDUAL TYPE: ICD-10-CM

## 2023-09-25 RX ORDER — DESVENLAFAXINE 25 MG/1
25 TABLET, EXTENDED RELEASE ORAL DAILY
Qty: 30 TABLET | Refills: 0 | OUTPATIENT
Start: 2023-09-25

## 2023-09-25 RX ORDER — BUPROPION HYDROCHLORIDE 200 MG/1
TABLET, EXTENDED RELEASE ORAL
Qty: 60 TABLET | Refills: 0 | OUTPATIENT
Start: 2023-09-25

## 2023-10-30 DIAGNOSIS — F41.1 GENERALIZED ANXIETY DISORDER: Chronic | ICD-10-CM

## 2023-10-30 DIAGNOSIS — F33.0 MILD EPISODE OF RECURRENT MAJOR DEPRESSIVE DISORDER: Chronic | ICD-10-CM

## 2023-10-30 DIAGNOSIS — I10 ESSENTIAL HYPERTENSION: Chronic | ICD-10-CM

## 2023-10-30 RX ORDER — LOSARTAN POTASSIUM 100 MG/1
100 TABLET ORAL DAILY
Qty: 90 TABLET | Refills: 0 | Status: SHIPPED | OUTPATIENT
Start: 2023-10-30

## 2023-10-30 RX ORDER — BUPROPION HYDROCHLORIDE 300 MG/1
300 TABLET ORAL DAILY
Qty: 90 TABLET | Refills: 0 | OUTPATIENT
Start: 2023-10-30

## 2023-10-30 NOTE — TELEPHONE ENCOUNTER
Rx Refill Note  Requested Prescriptions     Pending Prescriptions Disp Refills    losartan (COZAAR) 100 MG tablet [Pharmacy Med Name: LOSARTAN POTASSIUM 100 MG TAB] 90 tablet 0     Sig: TAKE ONE TABLET BY MOUTH DAILY      Last office visit with prescribing clinician: 2/8/23    Next office visit with prescribing clinician: Return if symptoms worsen or fail to improve.     Samira Bartholomew MA  10/30/23, 08:09 EDT

## 2024-01-11 DIAGNOSIS — I10 ESSENTIAL HYPERTENSION: Chronic | ICD-10-CM

## 2024-01-11 NOTE — TELEPHONE ENCOUNTER
Rx Refill Note  Requested Prescriptions     Pending Prescriptions Disp Refills    amLODIPine (NORVASC) 2.5 MG tablet [Pharmacy Med Name: amLODIPine BESYLATE 2.5 MG TAB] 90 tablet 0     Sig: TAKE ONE TABLET BY MOUTH ONCE NIGHTLY      Last office visit with prescribing clinician: 2/8/2023   Last telemedicine visit with prescribing clinician: Visit date not found   Next office visit with prescribing clinician: Visit date not found   Ledy Berger MA  01/11/24, 15:14 EST  Called pt, left voicemail to call back office (office number given)     OK FOR HUB TO RELAY MESSAGE    Pt had an establishing care appointment with  on 10/11/23 and has future appointments with Pau Galdamez. Has he switched primary care doctors?

## 2024-01-11 NOTE — TELEPHONE ENCOUNTER
Rx Refill Note  Requested Prescriptions     Pending Prescriptions Disp Refills    amLODIPine (NORVASC) 2.5 MG tablet [Pharmacy Med Name: amLODIPine BESYLATE 2.5 MG TAB] 90 tablet 0     Sig: TAKE ONE TABLET BY MOUTH ONCE NIGHTLY      Last office visit with prescribing clinician: 2/8/2023   Last telemedicine visit with prescribing clinician: Visit date not found   Next office visit with prescribing clinician: Visit date not found                         Would you like a call back once the refill request has been completed: [] Yes [] No    If the office needs to give you a call back, can they leave a voicemail: [] Yes [] No    Karli Bains MA  01/11/24, 14:56 EST

## 2024-01-12 NOTE — TELEPHONE ENCOUNTER
Rx Refill Note  Requested Prescriptions     Pending Prescriptions Disp Refills    amLODIPine (NORVASC) 2.5 MG tablet [Pharmacy Med Name: amLODIPine BESYLATE 2.5 MG TAB] 90 tablet 0     Sig: TAKE ONE TABLET BY MOUTH ONCE NIGHTLY      Last office visit with prescribing clinician: 2/8/2023   Last telemedicine visit with prescribing clinician: Visit date not found   Next office visit with prescribing clinician: Visit date not found                         Would you like a call back once the refill request has been completed: [] Yes [] No    If the office needs to give you a call back, can they leave a voicemail: [] Yes [] No    Rody Steinberg MA  01/12/24, 08:13 EST    Patient was to   Return in about 6 months (around 5/28/2023) for Physical and fasting labs.    Called and left  for patient to return call    OK FOR HUB TO RELAY MESSAGE AND SCHEDULE ANNUAL EXAM

## 2024-01-15 RX ORDER — AMLODIPINE BESYLATE 2.5 MG/1
2.5 TABLET ORAL NIGHTLY
Qty: 90 TABLET | Refills: 0 | OUTPATIENT
Start: 2024-01-15

## 2024-01-15 NOTE — TELEPHONE ENCOUNTER
3RD ATTEMPT TO REACH PATIENT    Patient was to   Return in about 6 months (around 5/28/2023) for Physical and fasting labs.     Called and left  for patient to return call     OK FOR HUB TO RELAY MESSAGE AND SCHEDULE ANNUAL EXAM

## 2024-02-08 ENCOUNTER — OFFICE VISIT (OUTPATIENT)
Dept: SLEEP MEDICINE | Facility: CLINIC | Age: 39
End: 2024-02-08
Payer: COMMERCIAL

## 2024-02-08 VITALS
SYSTOLIC BLOOD PRESSURE: 130 MMHG | TEMPERATURE: 97.8 F | HEART RATE: 114 BPM | OXYGEN SATURATION: 95 % | WEIGHT: 251 LBS | DIASTOLIC BLOOD PRESSURE: 72 MMHG | HEIGHT: 70 IN | BODY MASS INDEX: 35.93 KG/M2

## 2024-02-08 DIAGNOSIS — G47.33 OSA (OBSTRUCTIVE SLEEP APNEA): Primary | ICD-10-CM

## 2024-02-08 DIAGNOSIS — G47.34 NOCTURNAL HYPOXEMIA: ICD-10-CM

## 2024-02-08 PROCEDURE — 3075F SYST BP GE 130 - 139MM HG: CPT | Performed by: NURSE PRACTITIONER

## 2024-02-08 PROCEDURE — 3078F DIAST BP <80 MM HG: CPT | Performed by: NURSE PRACTITIONER

## 2024-02-08 PROCEDURE — 99213 OFFICE O/P EST LOW 20 MIN: CPT | Performed by: NURSE PRACTITIONER

## 2024-02-08 RX ORDER — ATORVASTATIN CALCIUM 20 MG/1
20 TABLET, FILM COATED ORAL DAILY
COMMUNITY

## 2024-02-08 NOTE — PROGRESS NOTES
Chief Complaint:   Chief Complaint   Patient presents with    Follow-up    Sleep Apnea       HPI:    Frank Champion is a 38 y.o. male here for follow-up of sleep apnea.  Patient was last seen 12/15/2022.  Patient was having frequent nighttime awakening, snoring and gasping.  He did have a sleep study 2/21/2023 that showed severe obstructive sleep apnea with an AHI of 68 with a low oxygen of 53%.  Patient has not yet had a follow-up visit or recheck on oxygen.  He sleeps 5 to 7 hours nightly and goes to sleep quickly.  Patient has an Milnor score of 15/24.  He states he is not rested.  We will check an O2 sat while wearing CPAP and he is going back to his doctor to get put back on his Vyvanse.  We will also see if this is helpful.  Will reassess in 8 weeks.        Current medications are:   Current Outpatient Medications:     amLODIPine (NORVASC) 2.5 MG tablet, Take 1 tablet by mouth Every Night., Disp: 90 tablet, Rfl: 0    atorvastatin (LIPITOR) 20 MG tablet, Take 1 tablet by mouth Daily., Disp: , Rfl:     buPROPion SR (WELLBUTRIN SR) 200 MG 12 hr tablet, Take 1 tablet by mouth 2 (Two) Times a Day., Disp: 60 tablet, Rfl: 0    cetirizine (zyrTEC) 10 MG tablet, Take 1 tablet by mouth Daily., Disp: , Rfl:     Desvenlafaxine Succinate ER 25 MG tablet sustained-release 24 hour, Take 1 tablet by mouth Daily., Disp: 30 tablet, Rfl: 0    famotidine (PEPCID) 10 MG tablet, Take 1 tablet by mouth 2 (Two) Times a Day., Disp: , Rfl:     losartan (COZAAR) 100 MG tablet, TAKE ONE TABLET BY MOUTH DAILY, Disp: 90 tablet, Rfl: 0    meloxicam (MOBIC) 15 MG tablet, Take 1 tablet by mouth Daily As Needed for Moderate Pain., Disp: 30 tablet, Rfl: 11.      The patient's relevant past medical, surgical, family and social history were reviewed and updated in Epic as appropriate.       Review of Systems   Constitutional:  Positive for fatigue.   Eyes:  Positive for visual disturbance.   Respiratory:  Positive for apnea.    Gastrointestinal:          Heartburn   Allergic/Immunologic: Positive for environmental allergies.   Psychiatric/Behavioral:  Positive for decreased concentration, dysphoric mood and sleep disturbance. The patient is nervous/anxious.    All other systems reviewed and are negative.        Objective:    Physical Exam  Constitutional:       Appearance: Normal appearance.   HENT:      Head: Normocephalic and atraumatic.      Mouth/Throat:      Comments: Mallampati 4 anatomy  Cardiovascular:      Rate and Rhythm: Normal rate and regular rhythm.   Pulmonary:      Effort: Pulmonary effort is normal.      Breath sounds: Normal breath sounds.   Skin:     General: Skin is warm and dry.   Neurological:      Mental Status: He is alert and oriented to person, place, and time.   Psychiatric:         Mood and Affect: Mood normal.         Behavior: Behavior normal.         Thought Content: Thought content normal.         Judgment: Judgment normal.         CPAP Report    29/30 days of use  Greater than 4-hour use 93%  Setting 8-18  95th percentile pressure 11.3  AHI 0.8  The patient continues to use and benefit from CPAP therapy.    ASSESSMENT/PLAN    Diagnoses and all orders for this visit:    1. MARLINE (obstructive sleep apnea) (Primary)  -     Overnight Sleep Oximetry Study; Future  -     PAP Therapy    2. Nocturnal hypoxemia  -     Overnight Sleep Oximetry Study; Future        Counseled patient regarding multimodal approach with healthy nutrition, healthy sleep, regular physical activity, social activities, counseling, and medications. Encouraged to practice lateral sleep position. Avoid alcohol and sedatives close to bedtime.    Refill supplies x 1 year.  Will do an overnight oximetry while wearing CPAP and see patient back in 10 weeks to see if his hypersomnia has been corrected.      Signed by  BETTY Georges    February 8, 2024      CC: Pau Galdamez APRN         No ref. provider found

## 2024-04-12 DIAGNOSIS — I10 ESSENTIAL HYPERTENSION: Chronic | ICD-10-CM

## 2024-04-12 RX ORDER — LOSARTAN POTASSIUM 100 MG/1
100 TABLET ORAL DAILY
Qty: 90 TABLET | Refills: 0 | OUTPATIENT
Start: 2024-04-12

## 2024-04-12 NOTE — TELEPHONE ENCOUNTER
Rx Refill Note  Requested Prescriptions     Pending Prescriptions Disp Refills    losartan (COZAAR) 100 MG tablet [Pharmacy Med Name: LOSARTAN POTASSIUM 100 MG TAB] 90 tablet 0     Sig: TAKE 1 TABLET BY MOUTH DAILY      Last office visit with prescribing clinician: 2/8/2023   Last telemedicine visit with prescribing clinician: Visit date not found   Next office visit with prescribing clinician: Visit date not found                         Would you like a call back once the refill request has been completed: [] Yes [] No    If the office needs to give you a call back, can they leave a voicemail: [] Yes [] No    Karli Bains MA  04/12/24, 11:24 EDT

## 2024-09-23 DIAGNOSIS — M54.12 CERVICAL RADICULOPATHY: ICD-10-CM

## 2024-09-23 RX ORDER — MELOXICAM 15 MG/1
15 TABLET ORAL DAILY PRN
Qty: 90 TABLET | OUTPATIENT
Start: 2024-09-23

## 2024-11-12 ENCOUNTER — APPOINTMENT (OUTPATIENT)
Facility: HOSPITAL | Age: 39
End: 2024-11-12
Payer: COMMERCIAL

## 2024-11-12 ENCOUNTER — HOSPITAL ENCOUNTER (EMERGENCY)
Facility: HOSPITAL | Age: 39
Discharge: HOME | End: 2024-11-12
Attending: EMERGENCY MEDICINE | Admitting: EMERGENCY MEDICINE
Payer: COMMERCIAL

## 2024-11-12 VITALS
DIASTOLIC BLOOD PRESSURE: 77 MMHG | OXYGEN SATURATION: 98 % | SYSTOLIC BLOOD PRESSURE: 129 MMHG | RESPIRATION RATE: 18 BRPM | TEMPERATURE: 100.4 F | BODY MASS INDEX: 33.6 KG/M2 | WEIGHT: 240 LBS | HEART RATE: 92 BPM | HEIGHT: 71 IN

## 2024-11-12 DIAGNOSIS — K57.92 DIVERTICULITIS: Primary | ICD-10-CM

## 2024-11-12 LAB
ALBUMIN SERPL-MCNC: 4.5 G/DL (ref 3.5–5.2)
ALBUMIN/GLOB SERPL: 1.2 G/DL
ALP SERPL-CCNC: 76 U/L (ref 39–117)
ALT SERPL W P-5'-P-CCNC: 24 U/L (ref 1–41)
AMORPH URATE CRY URNS QL MICRO: ABNORMAL /HPF
ANION GAP SERPL CALCULATED.3IONS-SCNC: 11.7 MMOL/L (ref 5–15)
AST SERPL-CCNC: 28 U/L (ref 1–40)
BACTERIA UR QL AUTO: ABNORMAL /HPF
BASOPHILS # BLD AUTO: 0.04 10*3/MM3 (ref 0–0.2)
BASOPHILS NFR BLD AUTO: 0.3 % (ref 0–1.5)
BILIRUB SERPL-MCNC: 0.8 MG/DL (ref 0–1.2)
BILIRUB UR QL STRIP: ABNORMAL
BUN SERPL-MCNC: 15 MG/DL (ref 6–20)
BUN/CREAT SERPL: 14.3 (ref 7–25)
CALCIUM SPEC-SCNC: 9.2 MG/DL (ref 8.6–10.5)
CHLORIDE SERPL-SCNC: 100 MMOL/L (ref 98–107)
CLARITY UR: CLEAR
CO2 SERPL-SCNC: 25.3 MMOL/L (ref 22–29)
COLOR UR: YELLOW
CREAT SERPL-MCNC: 1.05 MG/DL (ref 0.76–1.27)
D-LACTATE SERPL-SCNC: 0.9 MMOL/L (ref 0.5–2)
DEPRECATED RDW RBC AUTO: 44.3 FL (ref 37–54)
EGFRCR SERPLBLD CKD-EPI 2021: 93.8 ML/MIN/1.73
EOSINOPHIL # BLD AUTO: 0.04 10*3/MM3 (ref 0–0.4)
EOSINOPHIL NFR BLD AUTO: 0.3 % (ref 0.3–6.2)
ERYTHROCYTE [DISTWIDTH] IN BLOOD BY AUTOMATED COUNT: 13 % (ref 12.3–15.4)
GLOBULIN UR ELPH-MCNC: 3.7 GM/DL
GLUCOSE SERPL-MCNC: 102 MG/DL (ref 65–99)
GLUCOSE UR STRIP-MCNC: NEGATIVE MG/DL
HCT VFR BLD AUTO: 41.9 % (ref 37.5–51)
HGB BLD-MCNC: 13.7 G/DL (ref 13–17.7)
HGB UR QL STRIP.AUTO: ABNORMAL
HOLD SPECIMEN: NORMAL
HYALINE CASTS UR QL AUTO: ABNORMAL /LPF
IMM GRANULOCYTES # BLD AUTO: 0.01 10*3/MM3 (ref 0–0.05)
IMM GRANULOCYTES NFR BLD AUTO: 0.1 % (ref 0–0.5)
KETONES UR QL STRIP: ABNORMAL
LEUKOCYTE ESTERASE UR QL STRIP.AUTO: NEGATIVE
LIPASE SERPL-CCNC: 25 U/L (ref 13–60)
LYMPHOCYTES # BLD AUTO: 2.01 10*3/MM3 (ref 0.7–3.1)
LYMPHOCYTES NFR BLD AUTO: 13.4 % (ref 19.6–45.3)
MCH RBC QN AUTO: 29.7 PG (ref 26.6–33)
MCHC RBC AUTO-ENTMCNC: 32.7 G/DL (ref 31.5–35.7)
MCV RBC AUTO: 90.7 FL (ref 79–97)
MONOCYTES # BLD AUTO: 1.11 10*3/MM3 (ref 0.1–0.9)
MONOCYTES NFR BLD AUTO: 7.4 % (ref 5–12)
MUCOUS THREADS URNS QL MICRO: ABNORMAL /HPF
NEUTROPHILS NFR BLD AUTO: 11.74 10*3/MM3 (ref 1.7–7)
NEUTROPHILS NFR BLD AUTO: 78.5 % (ref 42.7–76)
NITRITE UR QL STRIP: NEGATIVE
PH UR STRIP.AUTO: 6 [PH] (ref 5–8)
PLATELET # BLD AUTO: 410 10*3/MM3 (ref 140–450)
PMV BLD AUTO: 11.1 FL (ref 6–12)
POTASSIUM SERPL-SCNC: 4 MMOL/L (ref 3.5–5.2)
PROT SERPL-MCNC: 8.2 G/DL (ref 6–8.5)
PROT UR QL STRIP: NEGATIVE
RBC # BLD AUTO: 4.62 10*6/MM3 (ref 4.14–5.8)
RBC # UR STRIP: ABNORMAL /HPF
REF LAB TEST METHOD: ABNORMAL
SODIUM SERPL-SCNC: 137 MMOL/L (ref 136–145)
SP GR UR STRIP: 1.02 (ref 1–1.03)
SQUAMOUS #/AREA URNS HPF: ABNORMAL /HPF
TRANS CELLS #/AREA URNS HPF: ABNORMAL /HPF
UROBILINOGEN UR QL STRIP: ABNORMAL
WBC # UR STRIP: ABNORMAL /HPF
WBC NRBC COR # BLD AUTO: 14.95 10*3/MM3 (ref 3.4–10.8)
WHOLE BLOOD HOLD COAG: NORMAL
WHOLE BLOOD HOLD SPECIMEN: NORMAL

## 2024-11-12 PROCEDURE — 99285 EMERGENCY DEPT VISIT HI MDM: CPT

## 2024-11-12 PROCEDURE — 74177 CT ABD & PELVIS W/CONTRAST: CPT

## 2024-11-12 PROCEDURE — 25010000002 ONDANSETRON PER 1 MG: Performed by: PHYSICIAN ASSISTANT

## 2024-11-12 PROCEDURE — 25010000002 MORPHINE PER 10 MG: Performed by: EMERGENCY MEDICINE

## 2024-11-12 PROCEDURE — 83605 ASSAY OF LACTIC ACID: CPT | Performed by: PHYSICIAN ASSISTANT

## 2024-11-12 PROCEDURE — 96374 THER/PROPH/DIAG INJ IV PUSH: CPT

## 2024-11-12 PROCEDURE — 81001 URINALYSIS AUTO W/SCOPE: CPT | Performed by: EMERGENCY MEDICINE

## 2024-11-12 PROCEDURE — 25810000003 LACTATED RINGERS SOLUTION: Performed by: PHYSICIAN ASSISTANT

## 2024-11-12 PROCEDURE — 85025 COMPLETE CBC W/AUTO DIFF WBC: CPT | Performed by: EMERGENCY MEDICINE

## 2024-11-12 PROCEDURE — 80053 COMPREHEN METABOLIC PANEL: CPT | Performed by: EMERGENCY MEDICINE

## 2024-11-12 PROCEDURE — 96375 TX/PRO/DX INJ NEW DRUG ADDON: CPT

## 2024-11-12 PROCEDURE — 25510000001 IOPAMIDOL 61 % SOLUTION: Performed by: EMERGENCY MEDICINE

## 2024-11-12 PROCEDURE — 83690 ASSAY OF LIPASE: CPT | Performed by: EMERGENCY MEDICINE

## 2024-11-12 RX ORDER — IOPAMIDOL 612 MG/ML
100 INJECTION, SOLUTION INTRAVASCULAR
Status: COMPLETED | OUTPATIENT
Start: 2024-11-12 | End: 2024-11-12

## 2024-11-12 RX ORDER — ONDANSETRON 2 MG/ML
4 INJECTION INTRAMUSCULAR; INTRAVENOUS ONCE
Status: COMPLETED | OUTPATIENT
Start: 2024-11-12 | End: 2024-11-12

## 2024-11-12 RX ORDER — ONDANSETRON 4 MG/1
4 TABLET, ORALLY DISINTEGRATING ORAL EVERY 6 HOURS PRN
Qty: 12 TABLET | Refills: 0 | Status: SHIPPED | OUTPATIENT
Start: 2024-11-12

## 2024-11-12 RX ORDER — SODIUM CHLORIDE 9 MG/ML
10 INJECTION, SOLUTION INTRAMUSCULAR; INTRAVENOUS; SUBCUTANEOUS AS NEEDED
Status: DISCONTINUED | OUTPATIENT
Start: 2024-11-12 | End: 2024-11-12 | Stop reason: HOSPADM

## 2024-11-12 RX ORDER — ACETAMINOPHEN 500 MG
1000 TABLET ORAL ONCE
Status: COMPLETED | OUTPATIENT
Start: 2024-11-12 | End: 2024-11-12

## 2024-11-12 RX ADMIN — AMOXICILLIN AND CLAVULANATE POTASSIUM 1 TABLET: 875; 125 TABLET, FILM COATED ORAL at 14:28

## 2024-11-12 RX ADMIN — ACETAMINOPHEN 1000 MG: 500 TABLET ORAL at 14:28

## 2024-11-12 RX ADMIN — SODIUM CHLORIDE, POTASSIUM CHLORIDE, SODIUM LACTATE AND CALCIUM CHLORIDE 1000 ML: 600; 310; 30; 20 INJECTION, SOLUTION INTRAVENOUS at 12:33

## 2024-11-12 RX ADMIN — IOPAMIDOL 85 ML: 612 INJECTION, SOLUTION INTRAVENOUS at 13:15

## 2024-11-12 RX ADMIN — MORPHINE SULFATE 4 MG: 4 INJECTION, SOLUTION INTRAMUSCULAR; INTRAVENOUS at 12:33

## 2024-11-12 RX ADMIN — ONDANSETRON 4 MG: 2 INJECTION INTRAMUSCULAR; INTRAVENOUS at 12:33

## 2024-11-12 NOTE — FSED PROVIDER NOTE
Subjective  History of Present Illness:    Patient is a 38-year-old male with a medical history of ADHD, anxiety, depression, hypertension, HLD, borderline disorder presents to the emergency department for evaluation of abdominal pain.  Patient states symptoms seem to be mid abdomen but occasionally radiates to the right side of abdomen.  Patient denies urinary symptoms, changes in bowel movement, nausea or vomiting.  Patient denies known sick contacts.  Patient does report mild fever today.  Patient denies aggravating or leaving factors patient denies abdominal surgical history.      Nurses Notes reviewed and agree, including vitals, allergies, social history and prior medical history.     REVIEW OF SYSTEMS: All systems reviewed and not pertinent unless noted.  Review of Systems    Past Medical History:   Diagnosis Date    ADHD (attention deficit hyperactivity disorder)     Anxiety     Arthritis     Depression     HTN (hypertension)     Hypertriglyceridemia 2020    Wears glasses        Allergies:    Guanfacine, Strattera [atomoxetine], and Sulfa antibiotics      Past Surgical History:   Procedure Laterality Date    VASECTOMY           Social History     Socioeconomic History    Marital status:      Spouse name: Julienne Patel   Tobacco Use    Smoking status: Former     Current packs/day: 0.00     Average packs/day: 0.3 packs/day for 12.0 years (3.0 ttl pk-yrs)     Types: Cigarettes, Pipe, Cigars, Electronic Cigarette     Start date: 2010     Quit date: 2022     Years since quittin.9    Smokeless tobacco: Never    Tobacco comments:     never been a regular smoker, very little tobacco use. reports maybe 2 cigarettes per month.   Vaping Use    Vaping status: Former    Substances: Nicotine   Substance and Sexual Activity    Alcohol use: Yes     Alcohol/week: 3.0 - 4.0 standard drinks of alcohol     Types: 3 - 4 Glasses of wine per week    Drug use: Not Currently     Types: Marijuana     "Sexual activity: Yes     Partners: Female, Male     Birth control/protection: Condom, Surgical, None     Comment: Vasectomy, became ethically non-monogomous.         Family History   Problem Relation Age of Onset    Mental illness Mother     Depression Mother     Colon polyps Mother     Arthritis Father     Depression Father     Hyperlipidemia Paternal Grandfather     Hypertension Paternal Grandfather     Heart attack Paternal Grandfather     Stroke Paternal Grandfather     Alcohol abuse Paternal Grandfather     Alcohol abuse Paternal Grandmother     Colon cancer Neg Hx     Prostate cancer Neg Hx        Objective  Physical Exam:  /83   Pulse 93   Temp 100.4 °F (38 °C) (Oral)   Resp 18   Ht 180.3 cm (71\")   Wt 109 kg (240 lb)   SpO2 99%   BMI 33.47 kg/m²      Physical Exam    Procedures    ED Course:         Lab Results (last 24 hours)       Procedure Component Value Units Date/Time    CBC & Differential [[id number]]  (Abnormal) Collected: 11/12/24 1213    Specimen: Blood Updated: 11/12/24 1228    Narrative:      The following orders were created for panel order CBC & Differential.  Procedure                               Abnormality         Status                     ---------                               -----------         ------                     CBC Auto Differential[[id number]]        Abnormal            Final result                 Please view results for these tests on the individual orders.    Comprehensive Metabolic Panel [[id number]]  (Abnormal) Collected: 11/12/24 1213    Specimen: Blood Updated: 11/12/24 1248     Glucose 102 mg/dL      BUN 15 mg/dL      Creatinine 1.05 mg/dL      Sodium 137 mmol/L      Potassium 4.0 mmol/L      Comment: Specimen hemolyzed.  Result may be falsely elevated.        Chloride 100 mmol/L      CO2 25.3 mmol/L      Calcium 9.2 mg/dL      Total Protein 8.2 g/dL      Albumin 4.5 g/dL      ALT (SGPT) 24 U/L      AST (SGOT) 28 U/L      Comment: Specimen hemolyzed.  " Result may be falsely elevated.        Alkaline Phosphatase 76 U/L      Total Bilirubin 0.8 mg/dL      Globulin 3.7 gm/dL      A/G Ratio 1.2 g/dL      BUN/Creatinine Ratio 14.3     Anion Gap 11.7 mmol/L      eGFR 93.8 mL/min/1.73     Narrative:      GFR Normal >60  Chronic Kidney Disease <60  Kidney Failure <15      Lipase [[id number]]  (Normal) Collected: 11/12/24 1213    Specimen: Blood Updated: 11/12/24 1248     Lipase 25 U/L     CBC Auto Differential [[id number]]  (Abnormal) Collected: 11/12/24 1213    Specimen: Blood Updated: 11/12/24 1228     WBC 14.95 10*3/mm3      RBC 4.62 10*6/mm3      Hemoglobin 13.7 g/dL      Hematocrit 41.9 %      MCV 90.7 fL      MCH 29.7 pg      MCHC 32.7 g/dL      RDW 13.0 %      RDW-SD 44.3 fl      MPV 11.1 fL      Platelets 410 10*3/mm3      Neutrophil % 78.5 %      Lymphocyte % 13.4 %      Monocyte % 7.4 %      Eosinophil % 0.3 %      Basophil % 0.3 %      Immature Grans % 0.1 %      Neutrophils, Absolute 11.74 10*3/mm3      Lymphocytes, Absolute 2.01 10*3/mm3      Monocytes, Absolute 1.11 10*3/mm3      Eosinophils, Absolute 0.04 10*3/mm3      Basophils, Absolute 0.04 10*3/mm3      Immature Grans, Absolute 0.01 10*3/mm3     Lactic Acid, Plasma [[id number]]  (Normal) Collected: 11/12/24 1213    Specimen: Blood Updated: 11/12/24 1246     Lactate 0.9 mmol/L     Urinalysis With Microscopic If Indicated (No Culture) - Urine, Clean Catch [[id number]]  (Abnormal) Collected: 11/12/24 1236    Specimen: Urine, Clean Catch Updated: 11/12/24 1248     Color, UA Yellow     Appearance, UA Clear     pH, UA 6.0     Specific Gravity, UA 1.025     Glucose, UA Negative     Ketones, UA 15 mg/dL (1+)     Bilirubin, UA Small (1+)     Blood, UA Trace     Protein, UA Negative     Leuk Esterase, UA Negative     Nitrite, UA Negative     Urobilinogen, UA 0.2 E.U./dL    Urinalysis, Microscopic Only - Urine, Clean Catch [[id number]]  (Abnormal) Collected: 11/12/24 1236    Specimen: Urine, Clean Catch Updated:  11/12/24 1251     RBC, UA 0-2 /HPF      WBC, UA 0-2 /HPF      Bacteria, UA Trace /HPF      Squamous Epithelial Cells, UA 0-2 /HPF      Transitional Epithelial Cells, UA 0-2 /HPF      Hyaline Casts, UA None Seen /LPF      Amorphous Crystals, UA Small/1+ /HPF      Mucus, UA Large/3+ /HPF      Methodology Manual Light Microscopy             CT Abdomen Pelvis With Contrast    Result Date: 11/12/2024  CT ABDOMEN PELVIS W CONTRAST Date of Exam: 11/12/2024 1:13 PM EST Indication: RLQ pain, fever. Comparison: None available. Technique: Axial CT images were obtained of the abdomen and pelvis following the uneventful intravenous administration of 85 mL Isovue-300. Reconstructed coronal and sagittal images were also obtained. Automated exposure control and iterative construction methods were used. Findings: Liver: The liver is unremarkable in morphology. No focal liver lesion is seen. No biliary dilation is seen. Gallbladder: Unremarkable. Pancreas: Unremarkable. Spleen: Unremarkable. Adrenal glands: Unremarkable. Genitourinary tract: Kidneys are unremarkable. No hydronephrosis is seen. The visualized portions of the ureters and urinary bladder appear unremarkable. Prostate gland is unremarkable. Gastrointestinal tract: Colonic diverticulosis is present. There is wall thickening and pericolonic stranding centered about inflamed diverticula of the mid sigmoid colon. Findings are compatible with acute diverticulitis. No free air or abscess formation is identified. There is no evidence of bowel obstruction. Hollow viscera appear otherwise unremarkable. Appendix: The appendix is unremarkable. Other findings: No pathologically enlarged lymph nodes are seen. The abdominal aorta and IVC appear unremarkable. Bones and soft tissues: No acute or suspicious osseous or soft tissue lesion is identified. Lung bases: The visualized lung bases are clear.     Impression: Impression: 1.Acute diverticulitis involving the mid sigmoid colon. No  free air or abscess formation is identified. 2.Normal appendix. 3.Additional findings as detailed above. Electronically Signed: Liban Newman MD  11/12/2024 1:39 PM EST  Workstation ID: [id number]        [facility]      Initial impression of presenting illness: Patient is a 38-year-old male with medical history of borderline personality disorder, depression, anxiety, hypertension presents the emergency department for evaluation of abdominal pain that started yesterday.    Patient arrives temperature 100.4, normotensive, nontoxic-appearing. With vitals interpreted by myself.     Pertinent features from physical exam: Diffuse abdomen TTP without guarding.    DDX: includes but is not limited to: Appendicitis, pancreatitis, diverticulitis, bowel obstruction, constipation, UTI, cholecystitis, colitis, other    Initial diagnostic plan and interventions: Workup includes CBC, CMP, lipase, urinalysis, CT abdomen pelvis with contrast.  Interventions include IV morphine, Zofran.    Diagnostic information from other sources: Previous encounter    Results from initial plan were reviewed and interpreted by me revealing mild white count of 14.  No significant anemia Preston abnormality.  Lipase unremarkable.  CT abdomen pelvis is notable for Acute diverticulitis involving the mid sigmoid colon. No free air or abscess formation is identified. Appendix is normal.    Re-evaluation: Patient has improved symptoms after interventions.  Patient continues to remain hemodynamic stable nontoxic-appearing.  Patient will be given Augmentin in the emergency department and sent home with Augmentin.  Patient is given very strict return precautions to the emergency department.  Patient educated about bowel rest and clear liquid diet and requirement for close follow-up.  Patient is young to be having diverticulitis.  Patient given referral to GI provider for further evaluation of symptoms.      Medications   Sodium Chloride (PF) 0.9 % 10 mL (has no  administration in time range)   acetaminophen (TYLENOL) tablet 1,000 mg (has no administration in time range)   amoxicillin-clavulanate (AUGMENTIN) 875-125 MG per tablet 1 tablet (has no administration in time range)   lactated ringers bolus 1,000 mL (1,000 mL Intravenous New Bag 11/12/24 1233)   ondansetron (ZOFRAN) injection 4 mg (4 mg Intravenous Given 11/12/24 1233)   morphine injection 4 mg (4 mg Intravenous Given 11/12/24 1233)   iopamidol (ISOVUE-300) 61 % injection 100 mL (85 mL Intravenous Given 11/12/24 1315)       Results/clinical rationale were discussed with patient    Consultations/Discussion of results with other physicians: attending    Data interpreted: Nursing notes reviewed, vital signs reviewed.  Labs independently interpreted by me.  Imaging independently interpreted by me.  EKG independently interpreted by me.     Counseling: Discussed the results above with the patient regarding need for admission or discharge.  Patient understands and agrees plan of care.      -----  ED Disposition       ED Disposition   Discharge    Condition   Stable    Comment   --             Final diagnoses:   Diverticulitis      Your Follow-Up Providers       PATIENT CONNECTION - [place]. Schedule an appointment as soon as possible for a visit in 3 days.    [facility] [id number] [phone]                     Contact information for after-discharge care    Follow-up information has not been specified.                    Your medication list        START taking these medications        Instructions Last Dose Given Next Dose Due   amoxicillin-clavulanate 875-125 MG per tablet  Commonly known as: AUGMENTIN      Take 1 tablet by mouth Every 12 (Twelve) Hours.       ondansetron ODT 4 MG disintegrating tablet  Commonly known as: ZOFRAN-ODT      Take 1 tablet by mouth Every 6 (Six) Hours As Needed for Nausea or Vomiting.              CONTINUE taking these medications        Instructions Last Dose Given Next Dose Due    amLODIPine 2.5 MG tablet  Commonly known as: NORVASC      Take 1 tablet by mouth Every Night.       atorvastatin 20 MG tablet  Commonly known as: LIPITOR      Take 1 tablet by mouth Daily.       buPROPion  MG 12 hr tablet  Commonly known as: WELLBUTRIN SR      Take 1 tablet by mouth 2 (Two) Times a Day.       cetirizine 10 MG tablet  Commonly known as: zyrTEC      Take 1 tablet by mouth Daily.       Desvenlafaxine Succinate ER 25 MG tablet sustained-release 24 hour      Take 1 tablet by mouth Daily.       famotidine 10 MG tablet  Commonly known as: PEPCID      Take 1 tablet by mouth 2 (Two) Times a Day.       losartan 100 MG tablet  Commonly known as: COZAAR      TAKE ONE TABLET BY MOUTH DAILY       meloxicam 15 MG tablet  Commonly known as: MOBIC      Take 1 tablet by mouth Daily As Needed for Moderate Pain.                 Where to Get Your Medications        These medications were sent to [facility] [id number] - [place], [place] - [address] [place] [address] [address] [place] - [phone]  - [phone] [place] [address]      Phone: [phone]   amoxicillin-clavulanate 875-125 MG per tablet  ondansetron ODT 4 MG disintegrating tablet

## 2024-11-12 NOTE — Clinical Note
[facility] EMERGENCY DEPARTMENT [place]  [address]  [facility] [address]  Phone: [phone]  Fax: [phone]    Frank Champion was seen and treated in our emergency department on 11/12/2024.  He may return to work on 11/14/2024.         Thank you for choosing [facility].    Pollo Silver PA      
[facility] EMERGENCY DEPARTMENT [place]  [address]  [facility] [address]  Phone: [phone]  Fax: [phone]    Frank Champion was seen and treated in our emergency department on 11/12/2024.  He may return to work on 11/14/2024.         Thank you for choosing [facility].    Pollo Silver PA      
No

## 2024-11-12 NOTE — DISCHARGE INSTRUCTIONS
Follow-up with PCP for recheck of symptoms.  You have been diagnosed with diverticulitis and have been given prescription for Augmentin to take daily.  Please follow-up with GI provider, ambulatory referral was placed for you for further recheck of your symptoms and follow-up very closely with PCP.  Bowel rest with clear liquid diet for the next several days.  If symptoms worsen while taking antibiotics have low threshold to return the emergency department for new, worsening, concerning signs

## 2025-01-13 ENCOUNTER — OFFICE VISIT (OUTPATIENT)
Dept: GASTROENTEROLOGY | Facility: CLINIC | Age: 40
End: 2025-01-13
Payer: COMMERCIAL

## 2025-01-13 VITALS
WEIGHT: 239 LBS | SYSTOLIC BLOOD PRESSURE: 137 MMHG | HEART RATE: 89 BPM | DIASTOLIC BLOOD PRESSURE: 89 MMHG | TEMPERATURE: 96.9 F | HEIGHT: 71 IN | BODY MASS INDEX: 33.46 KG/M2

## 2025-01-13 DIAGNOSIS — K21.9 GASTROESOPHAGEAL REFLUX DISEASE, UNSPECIFIED WHETHER ESOPHAGITIS PRESENT: Primary | ICD-10-CM

## 2025-01-13 PROCEDURE — 99214 OFFICE O/P EST MOD 30 MIN: CPT

## 2025-01-13 RX ORDER — BACILLUS COAGULANS/INULIN 1B-250 MG
CAPSULE ORAL 3 TIMES DAILY
COMMUNITY

## 2025-01-13 RX ORDER — PANTOPRAZOLE SODIUM 40 MG/1
40 TABLET, DELAYED RELEASE ORAL DAILY
Qty: 90 TABLET | Refills: 3 | Status: SHIPPED | OUTPATIENT
Start: 2025-01-13

## 2025-01-13 RX ORDER — LOSARTAN POTASSIUM AND HYDROCHLOROTHIAZIDE 12.5; 1 MG/1; MG/1
1 TABLET ORAL DAILY
COMMUNITY
Start: 2025-01-09

## 2025-01-13 RX ORDER — LAMOTRIGINE 100 MG/1
TABLET, EXTENDED RELEASE ORAL
COMMUNITY
Start: 2024-09-19

## 2025-01-13 RX ORDER — DEXTROAMPHETAMINE SACCHARATE, AMPHETAMINE ASPARTATE, DEXTROAMPHETAMINE SULFATE AND AMPHETAMINE SULFATE 1.25; 1.25; 1.25; 1.25 MG/1; MG/1; MG/1; MG/1
5 TABLET ORAL DAILY
COMMUNITY
Start: 2024-12-08

## 2025-01-13 RX ORDER — LISDEXAMFETAMINE DIMESYLATE 60 MG/1
60 CAPSULE ORAL
COMMUNITY
Start: 2024-12-08

## 2025-01-13 NOTE — PROGRESS NOTES
Office Note     Name: Frank Champion    : 1985     MRN: [id number]     Chief Complaint  follow up diverticulitis    Subjective     History of Present Illness:  History of Present Illness  The patient is a 39-year-old male who presents today to establish care. This follows ER evaluation on 24 with CT findings of acute diverticulitis in the mid sigmoid colon, characterized by lower abdominal pain and low-grade fever. He was treated with a course of Augmentin.     He experienced his second episode of diverticulitis in 2024, which was more severe than the first. The initial episode was managed without medical intervention, but the subsequent episode necessitated an emergency room visit due to the intensity of the pain, which he initially mistook for appendicitis. The pain, located under his umbilicus, began as a gradual discomfort that escalated into significant pain, accompanied by bloating and an urge to defecate without relief.     His mother has a history of hemorrhoids, a condition he also suffers from. His bowel habits remain inconsistent, with occasional normalcy interspersed with irregularity. He experiences brief episodes of abdominal pain a few times a week, which resolves post-defecation. He acknowledges the need to increase his water intake. He has never undergone a colonoscopy.     He has a history of heartburn and reflux, although these symptoms have not been prominent recently. He has experienced aspiration of stomach acid during sleep, causing severe pain. He reports dysphagia about once a week, requiring him to drink a lot of water with swallowing. He has previously tried Nexium and Prilosec, both of which were effective.    He has a compressed disc in his back. He takes Mobic when it flares up, but he has not taken it in months.    FAMILY HISTORY  His mother has struggled with hemorrhoids. His maternal grandmother has diverticulosis and has had several bouts of it over her  life. He reports no family history of colon cancer or esophageal cancer.    MEDICATIONS  Current: Metamucil, Pepcid, Pepto, Mobic   Past Medical History:   Past Medical History:   Diagnosis Date    ADHD (attention deficit hyperactivity disorder)     Anxiety     Arthritis     Depression     HTN (hypertension)     Hypertriglyceridemia 02/22/2020    Wears glasses        Past Surgical History:   Past Surgical History:   Procedure Laterality Date    VASECTOMY         Immunizations:   Immunization History   Administered Date(s) Administered    COVID-19 (MODERNA) 1st,2nd,3rd Dose Monovalent 02/12/2021, 03/19/2021, 11/03/2021    COVID-19 (PFIZER) BIVALENT 12+YRS 09/22/2022    Fluzone (or Fluarix & Flulaval for VFC) >6mos 09/23/2020, 10/11/2023    Hepatitis B Adolescent High Risk Infant 07/01/1998, 08/05/1998, 12/16/1998    Hepatitis B Adult/Adolescent IM 07/01/1998, 08/05/1998, 12/16/1998    Influenza, Unspecified 08/15/2019    MMR 07/01/1998, 11/29/2021    Monkeypox/Smallpox ID (JYNNEOS) 09/01/2022    Td (TDVAX) 12/16/1998    Tdap 04/15/2021    Varicella 11/29/2021, 01/03/2022        Medications:     Current Outpatient Medications:     amphetamine-dextroamphetamine (ADDERALL) 5 MG tablet, Take 1 tablet by mouth Daily., Disp: , Rfl:     atorvastatin (LIPITOR) 20 MG tablet, Take 1 tablet by mouth Daily., Disp: , Rfl:     Bacillus Coagulans-Inulin (Probiotic) 1-250 BILLION-MG capsule, Take  by mouth 3 (Three) Times a Day., Disp: , Rfl:     buPROPion SR (WELLBUTRIN SR) 200 MG 12 hr tablet, Take 1 tablet by mouth 2 (Two) Times a Day., Disp: 60 tablet, Rfl: 0    cetirizine (zyrTEC) 10 MG tablet, Take 1 tablet by mouth Daily., Disp: , Rfl:     famotidine (PEPCID) 10 MG tablet, Take 1 tablet by mouth 2 (Two) Times a Day., Disp: , Rfl:     lamoTRIgine  MG tablet sustained-release 24 hour, , Disp: , Rfl:     lisdexamfetamine (VYVANSE) 60 MG capsule, Take 1 capsule by mouth, Disp: , Rfl:     losartan-hydrochlorothiazide  (HYZAAR) 100-12.5 MG per tablet, Take 1 tablet by mouth Daily., Disp: , Rfl:     meloxicam (MOBIC) 15 MG tablet, Take 1 tablet by mouth Daily As Needed for Moderate Pain., Disp: 30 tablet, Rfl: 11    ondansetron ODT (ZOFRAN-ODT) 4 MG disintegrating tablet, Take 1 tablet by mouth Every 6 (Six) Hours As Needed for Nausea or Vomiting., Disp: 12 tablet, Rfl: 0    psyllium (METAMUCIL) 58.6 % powder, Take  by mouth 3 (Three) Times a Day., Disp: , Rfl:     amLODIPine (NORVASC) 2.5 MG tablet, Take 1 tablet by mouth Every Night. (Patient not taking: Reported on 1/13/2025), Disp: 90 tablet, Rfl: 0    amoxicillin-clavulanate (AUGMENTIN) 875-125 MG per tablet, Take 1 tablet by mouth Every 12 (Twelve) Hours., Disp: 20 tablet, Rfl: 0    Desvenlafaxine Succinate ER 25 MG tablet sustained-release 24 hour, Take 1 tablet by mouth Daily. (Patient not taking: Reported on 1/13/2025), Disp: 30 tablet, Rfl: 0    losartan (COZAAR) 100 MG tablet, TAKE ONE TABLET BY MOUTH DAILY (Patient not taking: Reported on 1/13/2025), Disp: 90 tablet, Rfl: 0    Allergies:   Allergies   Allergen Reactions    Guanfacine Other (See Comments)     sedation    Strattera [Atomoxetine] Other (See Comments)     Erectile dysfunction, difficulty urinating, urinary incontinence    Sulfa Antibiotics GI Intolerance       Family History:   Family History   Problem Relation Age of Onset    Mental illness Mother     Depression Mother     Colon polyps Mother     Arthritis Father     Depression Father     Hyperlipidemia Paternal Grandfather     Hypertension Paternal Grandfather     Heart attack Paternal Grandfather     Stroke Paternal Grandfather     Alcohol abuse Paternal Grandfather     Alcohol abuse Paternal Grandmother     Colon cancer Neg Hx     Prostate cancer Neg Hx        Social History:   Social History     Socioeconomic History    Marital status:      Spouse name: Julienne Patel   Tobacco Use    Smoking status: Former     Current packs/day: 0.00     Average  "packs/day: 0.3 packs/day for 12.0 years (3.0 ttl pk-yrs)     Types: Cigarettes, Pipe, Cigars, Electronic Cigarette     Start date: 2010     Quit date: 2022     Years since quittin.1    Smokeless tobacco: Never    Tobacco comments:     never been a regular smoker, very little tobacco use. reports maybe 2 cigarettes per month.   Vaping Use    Vaping status: Former    Substances: Nicotine   Substance and Sexual Activity    Alcohol use: Yes     Alcohol/week: 3.0 - 4.0 standard drinks of alcohol     Types: 3 - 4 Glasses of wine per week    Drug use: Not Currently     Types: Marijuana    Sexual activity: Yes     Partners: Female, Male     Birth control/protection: Condom, Surgical, None     Comment: Vasectomy, became ethically non-monogomous.         Objective     Vital Signs  There were no vitals taken for this visit.  Estimated body mass index is 33.47 kg/m² as calculated from the following:    Height as of 24: 180.3 cm (71\").    Weight as of 24: 109 kg (240 lb).          Physical Exam  Vitals reviewed.   Constitutional:       General: He is awake.   Cardiovascular:      Rate and Rhythm: Normal rate.   Pulmonary:      Effort: Pulmonary effort is normal.   Abdominal:      General: Bowel sounds are normal.      Palpations: Abdomen is soft.      Tenderness: There is no abdominal tenderness.   Skin:     General: Skin is warm and dry.      Capillary Refill: Capillary refill takes less than 2 seconds.   Neurological:      Mental Status: He is alert.        Physical Exam      Results        Assessment and Plan     Assessment & Plan  Gastroesophageal reflux disease, unspecified whether esophagitis present    Orders:    pantoprazole (PROTONIX) 40 MG EC tablet; Take 1 tablet by mouth Daily.       Assessment & Plan  1. Diverticulitis.  He experienced a bout of diverticulitis in November and was treated with Augmentin, which resolved his symptoms. He continues to have irregular bowel movements and " occasional abdominal pain. He is advised to maintain a high-fiber diet, aiming for 25 to 30 g daily, and to continue using Metamucil. FiberCon is suggested as an alternative if Metamucil causes bloating. A colonoscopy will be scheduled to assess the healing of diverticulitis.    2. Gastroesophageal reflux disease (GERD).  He reports occasional GERD symptoms, including aspiration of stomach acid during sleep. He is advised to minimize NSAIDs. A prescription for pantoprazole has been sent to his pharmacy.     3. Dysphagia.  He reports occasional difficulty with food getting hung in his esophagus. An EGD with esophageal dilation will be performed concurrently with the colonoscopy. Biopsies for H. pylori and celiac disease will be taken during the procedure.        Follow Up  After EGD/colonoscopy as needed    Patient or patient representative verbalized consent for the use of Ambient Listening during the visit with  BETTY Galvez for chart documentation. 1/13/2025  14:36 EST    BETTY Galvez  [facility] [address] [place] [address] [facility]OLOGY  [address] [place] [id number]

## 2025-01-23 ENCOUNTER — PATIENT ROUNDING (BHMG ONLY) (OUTPATIENT)
Dept: GASTROENTEROLOGY | Facility: CLINIC | Age: 40
End: 2025-01-23
Payer: COMMERCIAL

## 2025-01-23 NOTE — PROGRESS NOTES
January 23, 2025    Hello, may I speak with Frank Champion?    My name is KHOA      I am  with [id number]  [address] [id number].    Before we get started may I verify your date of birth? 1985    I am calling to officially welcome you to our practice and ask about your recent visit. Is this a good time to talk? yes    Tell me about your visit with us. What things went well?  THERE WERE NO ISSUES. I GOT IN AND WAS SEEN QUICKLY. EVERYONE WAS PROFESSIONAL AND FRIENDLY. I WAS WELL TAKEN CARE OF AND WAS PLEASED.        We're always looking for ways to make our patients' experiences even better. Do you have recommendations on ways we may improve?  no    Overall were you satisfied with your first visit to our practice? yes       I appreciate you taking the time to speak with me today. Is there anything else I can do for you? no      Thank you, and have a great day.

## 2025-02-18 RX ORDER — SODIUM, POTASSIUM,MAG SULFATES 17.5-3.13G
SOLUTION, RECONSTITUTED, ORAL ORAL
Qty: 354 ML | Refills: 0 | Status: SHIPPED | OUTPATIENT
Start: 2025-02-18

## 2025-03-05 ENCOUNTER — OUTSIDE FACILITY SERVICE (OUTPATIENT)
Dept: GASTROENTEROLOGY | Facility: CLINIC | Age: 40
End: 2025-03-05
Payer: COMMERCIAL